# Patient Record
Sex: FEMALE | Race: WHITE | HISPANIC OR LATINO | Employment: UNEMPLOYED | URBAN - METROPOLITAN AREA
[De-identification: names, ages, dates, MRNs, and addresses within clinical notes are randomized per-mention and may not be internally consistent; named-entity substitution may affect disease eponyms.]

---

## 2020-03-24 ENCOUNTER — TELEPHONE (OUTPATIENT)
Dept: SURGERY | Facility: CLINIC | Age: 45
End: 2020-03-24

## 2020-03-24 DIAGNOSIS — C50.212 CARCINOMA OF UPPER-INNER QUADRANT OF LEFT BREAST IN FEMALE, ESTROGEN RECEPTOR POSITIVE (HCC): Primary | ICD-10-CM

## 2020-03-24 DIAGNOSIS — Z17.0 CARCINOMA OF UPPER-INNER QUADRANT OF LEFT BREAST IN FEMALE, ESTROGEN RECEPTOR POSITIVE (HCC): Primary | ICD-10-CM

## 2020-03-24 NOTE — TELEPHONE ENCOUNTER
"Scheduled New Pt with Dr PEREZ  3-30-20 arrive 10:30    Pre screened for possible breast mri   Good on all questions.    HT:5'5\"        Mailed pw    Spoke to pt she v/u  Christi  "

## 2020-03-25 ENCOUNTER — TELEPHONE (OUTPATIENT)
Dept: SURGERY | Facility: CLINIC | Age: 45
End: 2020-03-25

## 2020-03-25 NOTE — TELEPHONE ENCOUNTER
Scheduled Bilateral Breast Mri w wo contrast.      3-26-20 arrive 7:00    Pt. Shamir       Pt said she is getting passport and they will back date-what ever she has done.    Christi

## 2020-03-26 ENCOUNTER — APPOINTMENT (OUTPATIENT)
Dept: MRI IMAGING | Facility: HOSPITAL | Age: 45
End: 2020-03-26

## 2020-03-26 ENCOUNTER — TELEPHONE (OUTPATIENT)
Dept: SURGERY | Facility: CLINIC | Age: 45
End: 2020-03-26

## 2020-03-27 ENCOUNTER — HOSPITAL ENCOUNTER (OUTPATIENT)
Dept: MRI IMAGING | Facility: HOSPITAL | Age: 45
Discharge: HOME | End: 2020-03-27
Admitting: SURGERY

## 2020-03-27 ENCOUNTER — LAB REQUISITION (OUTPATIENT)
Dept: LAB | Facility: HOSPITAL | Age: 45
End: 2020-03-27

## 2020-03-27 DIAGNOSIS — C50.212 CARCINOMA OF UPPER-INNER QUADRANT OF LEFT BREAST IN FEMALE, ESTROGEN RECEPTOR POSITIVE (HCC): ICD-10-CM

## 2020-03-27 DIAGNOSIS — Z00.00 ENCOUNTER FOR GENERAL ADULT MEDICAL EXAMINATION WITHOUT ABNORMAL FINDINGS: ICD-10-CM

## 2020-03-27 DIAGNOSIS — Z17.0 CARCINOMA OF UPPER-INNER QUADRANT OF LEFT BREAST IN FEMALE, ESTROGEN RECEPTOR POSITIVE (HCC): ICD-10-CM

## 2020-03-27 PROCEDURE — A9577 INJ MULTIHANCE: HCPCS | Performed by: SURGERY

## 2020-03-27 PROCEDURE — 77049 MRI BREAST C-+ W/CAD BI: CPT

## 2020-03-27 PROCEDURE — 0 GADOBENATE DIMEGLUMINE 529 MG/ML SOLUTION: Performed by: SURGERY

## 2020-03-27 RX ADMIN — GADOBENATE DIMEGLUMINE 15 ML: 529 INJECTION, SOLUTION INTRAVENOUS at 13:20

## 2020-03-30 ENCOUNTER — HOSPITAL ENCOUNTER (OUTPATIENT)
Dept: ULTRASOUND IMAGING | Facility: HOSPITAL | Age: 45
Discharge: HOME | End: 2020-03-30

## 2020-03-30 ENCOUNTER — TRANSCRIBE ORDERS (OUTPATIENT)
Dept: SURGERY | Facility: CLINIC | Age: 45
End: 2020-03-30

## 2020-03-30 ENCOUNTER — HOSPITAL ENCOUNTER (OUTPATIENT)
Dept: MAMMOGRAPHY | Facility: HOSPITAL | Age: 45
Discharge: HOME | End: 2020-03-30
Admitting: SURGERY

## 2020-03-30 ENCOUNTER — PREP FOR SURGERY (OUTPATIENT)
Dept: OTHER | Facility: HOSPITAL | Age: 45
End: 2020-03-30

## 2020-03-30 ENCOUNTER — TELEPHONE (OUTPATIENT)
Dept: SURGERY | Facility: CLINIC | Age: 45
End: 2020-03-30

## 2020-03-30 ENCOUNTER — OFFICE VISIT (OUTPATIENT)
Dept: SURGERY | Facility: CLINIC | Age: 45
End: 2020-03-30

## 2020-03-30 VITALS
DIASTOLIC BLOOD PRESSURE: 74 MMHG | WEIGHT: 162.3 LBS | HEART RATE: 92 BPM | HEIGHT: 64 IN | TEMPERATURE: 97.6 F | BODY MASS INDEX: 27.71 KG/M2 | SYSTOLIC BLOOD PRESSURE: 128 MMHG | OXYGEN SATURATION: 98 %

## 2020-03-30 DIAGNOSIS — C50.212 CARCINOMA OF UPPER-INNER QUADRANT OF LEFT BREAST IN FEMALE, ESTROGEN RECEPTOR POSITIVE (HCC): Primary | ICD-10-CM

## 2020-03-30 DIAGNOSIS — R92.1 BREAST CALCIFICATIONS ON MAMMOGRAM: ICD-10-CM

## 2020-03-30 DIAGNOSIS — Z17.0 CARCINOMA OF UPPER-INNER QUADRANT OF LEFT BREAST IN FEMALE, ESTROGEN RECEPTOR POSITIVE (HCC): Primary | ICD-10-CM

## 2020-03-30 DIAGNOSIS — R92.8 ABNORMAL MRI, BREAST: ICD-10-CM

## 2020-03-30 DIAGNOSIS — F12.90 MARIJUANA USE: ICD-10-CM

## 2020-03-30 DIAGNOSIS — R92.8 ABNORMAL MAMMOGRAM: ICD-10-CM

## 2020-03-30 DIAGNOSIS — R92.8 ABNORMAL MRI, BREAST: Primary | ICD-10-CM

## 2020-03-30 DIAGNOSIS — R92.8 ABNORMAL ULTRASOUND OF BREAST: ICD-10-CM

## 2020-03-30 DIAGNOSIS — N63.0 LUMP OR MASS IN BREAST: ICD-10-CM

## 2020-03-30 DIAGNOSIS — Z72.0 TOBACCO USE: ICD-10-CM

## 2020-03-30 DIAGNOSIS — C50.212 MALIGNANT NEOPLASM OF UPPER-INNER QUADRANT OF LEFT BREAST IN FEMALE, ESTROGEN RECEPTOR POSITIVE (HCC): ICD-10-CM

## 2020-03-30 DIAGNOSIS — Z17.0 MALIGNANT NEOPLASM OF UPPER-INNER QUADRANT OF LEFT BREAST IN FEMALE, ESTROGEN RECEPTOR POSITIVE (HCC): ICD-10-CM

## 2020-03-30 DIAGNOSIS — Z85.71 HISTORY OF HODGKIN'S LYMPHOMA: Primary | ICD-10-CM

## 2020-03-30 PROCEDURE — 77065 DX MAMMO INCL CAD UNI: CPT

## 2020-03-30 PROCEDURE — 99244 OFF/OP CNSLTJ NEW/EST MOD 40: CPT | Performed by: SURGERY

## 2020-03-30 PROCEDURE — G0279 TOMOSYNTHESIS, MAMMO: HCPCS

## 2020-03-30 RX ORDER — DIAZEPAM 2 MG/1
TABLET ORAL
COMMUNITY
Start: 2020-03-20 | End: 2020-04-28

## 2020-03-30 RX ORDER — ALBUTEROL SULFATE 90 UG/1
AEROSOL, METERED RESPIRATORY (INHALATION)
COMMUNITY
Start: 2020-02-13 | End: 2020-04-28

## 2020-03-30 RX ORDER — MELOXICAM 7.5 MG/1
TABLET ORAL
COMMUNITY
Start: 2020-01-13 | End: 2020-04-28

## 2020-03-30 RX ORDER — CEFAZOLIN SODIUM 2 G/100ML
2 INJECTION, SOLUTION INTRAVENOUS ONCE
Status: CANCELLED | OUTPATIENT
Start: 2020-06-03 | End: 2020-03-30

## 2020-03-30 RX ORDER — SODIUM CHLORIDE, SODIUM LACTATE, POTASSIUM CHLORIDE, CALCIUM CHLORIDE 600; 310; 30; 20 MG/100ML; MG/100ML; MG/100ML; MG/100ML
100 INJECTION, SOLUTION INTRAVENOUS CONTINUOUS
Status: CANCELLED | OUTPATIENT
Start: 2020-06-03

## 2020-03-30 RX ORDER — DIAZEPAM 5 MG/1
10 TABLET ORAL ONCE
Status: CANCELLED | OUTPATIENT
Start: 2020-06-03 | End: 2020-03-30

## 2020-03-30 RX ORDER — GABAPENTIN 100 MG/1
CAPSULE ORAL
COMMUNITY
Start: 2020-02-02 | End: 2020-04-28

## 2020-03-30 NOTE — TELEPHONE ENCOUNTER
Scheduled Right Diagnostic Mammogram and Rt Breast U/S  Scheduled with Ayala arrive today at 9:45      Scheduled Rt Stereo Breast Bx with Dr Lee and Dr Tim to do  3-31-20 arrive 11:30      Christi

## 2020-03-30 NOTE — PROGRESS NOTES
Chief Complaint: Jina Huddleston is a 45 y.o. female who was seen in consultation at the request of Luis Angel Callahan MD  for newly diagnosed breast cancer    History of Present Illness:  Patient presents with newly diagnosed breast cancer. She noted no new masses, skin changes, nipple discharge, nipple changes prior to her most recent imaging.  Her most recent imaging includes the followin2020   [facility]   Mammo  Jina Huddleston  BILATERAL SCREENING  CLINICAL INDICATION: This is the patient’s baseline screening mammogram.  COMPARISON: None.  Heterogeneously dense.  Superior/medial posterior third left breast, is a 10 mm mass which may have a central punctate focus of calcifications.  BIRADS: 0    2020   [facility]   Radiology  Jina Huddleston  ULTRASOUND LEFT BREAST  FINDINGS: 10:00 left breast microlobulated, solid mass measuring 12 mm. 12:00 left breast, 8 x 6 x 4 mm lesion a thin hyperechoic rim, probably benign, six-month follow up recommended.  IMPRESSION: 12 mm suspicious mass, 10:00 left breast explaining mammographic nodularity.  BIRADS: 4           She had a biopsy on the following day that showed:   2020   [facility]   Radiology  Jina Huddleston  ULTRASOUND GUIDED BREAST BX  Left breast.  10:00 position. 3 passes. Marker clip was placed.    2020   [facility]   Mammo  Jina Huddleston  DIAG MAMMO  CLINICAL INDICATION: Post-biopsy mammogram.  The breast parenchyma is heterogeneously. Biopsy clip is located within the mass in the 10:00 position of the deep left breast.  BIRADS: 0      2020   [facility]  Pathology  Jina Huddleston  1. Breast, Left, 10:00, Biopsy: Invasive ductal adenocarcinoma and ductal carcinoma in situ with  A. Invasive ductal adenocarcinoma.  1. [place] Grade II (tubular grade 3, nuclear grade 2, mitotic grade 1).  2. 8 mm  3. No lymphovascular or perineural invasion  4. ER - 100%  5. UT - 100%  6. HER2 - Negative (1+)  B. Ductal carcinoma in  situ.  1. Intermediate nuclear grade with solid and cribriform architecture and no necrosis.  Comment: Ki-67 will be performed.    03/17/2020   [facility]   Pathology  Jina Huddleston  Left Breast Mass at 10:00:   - Invasive ductal carcinoma.  - Grade 1 ([place] Score 2+2+1=5/9_  - No DCIS identified.  - Largest 2 cm.  ER - 100%, 3+  MN - 100%, 3+  HER2/selene Oncoprotein - 0+    I then arranged for a MRI and Dx mammo to follow:  03/27/2020   [facility]  Radiology  Jina Huddleston  MRI BREAST BILATERAL  Posterior one third upper-inner the left breast 11:00 11.5 cm from the nipple there is a metallic clip. No residual or suspicious enhancement is seen at this location.  No areas of abnormal enhancement or morphology are seen within the left breast. No evidence for left axillary or internal mammary chain adenopathy.  Posterior one third upper outer right breast 10:00 10 cm from the nipple there are 2 focal areas of enhancement nonspecific. One of the areas measures 0.8 cm 1.8 cm superior to t a focus of enhancement that measures 0.6 cm in greatest dimension. On the mammogram there is visualization of multiple microcalcifications in the posterior one third upper quadrant of the right breast in conjunction with a small areas of focal asymmetry in the axillary tail right breast.  Additionally, there is a 1.4 cm in length linear enhancement middle third right breast 4:00 in the region where there are linearly arranged microcalcifications middle third lower quadrant the right breast.  No other areas of abnormal enhancement or morphology are seen in the right breast. No evidence for right axillary or internal mammary chain adenopathy.  IMPRESSION:  1. Left breast appears to be a candidate for breast conservation therapy.  2. Right breast 4:00. Stereotactic guided biopsy of the calcifications is recommended.  3. There are 2 nonspecific focal areas of enhancement posterior one third upper outer quadrant of the right breast.  Further evaluation with spot compression and spot magnification upper outer the right breast is recommended.  BIRADS: 4    03/30/2020 [facility] MAMMO DIGITAL DIAG RASHI RIGHT  SHELL LEW   Right digital diagnostic mammography with tomosynthesis scattered fibroglandular densities. Middle third lower inner quadrant of the right breast microcalcifications that correspond to linear enhancement seen in the right breast at the 4:00 position. Additionally, there are multiple punctate monomorphic regional microcalcifications in the posterior one third upper outer quadrant right breast. A nonspecific focal area of enhancement is noted to be in that region on a breast MRI examination. There is also a persistent areas of focal asymmetry with ill defined margins with associated microcalcifications in the axillary tail of the right breast. The density  Measures  03.5 cm. This corresponds to a 0.8 cm nodular area of enhancement on the patient's breast MRI examination. IMPRESSION: 0.5 cm ill defined density with associate calcifications in the axillary tail. Corresponds to 0.8 cm nodular area of enhnacement in the posterior one third axillary tail right breast MRI. Correlation with a stereotactic  Guided right breast biopsy is recommended. There are microcalcifications seen in the middle third lower inner quadrant of the right breast at the 4:00 position. Correlation with stereotactic guided right breast biopsy is recommended. There are multiple regional microcalcifications seen in the posterior one third of the upper outer quadrant the right breast. Six month mammographic follow up is recommended.      She has not had a breast biopsy in the past.  She has her  ovaries, is postmenopausal, and takes nor hormones.Uterus removed 2004 for bleeding  Her family history includes the following: She has 1 daughter, 2 sisters, 3 maternal aunts, no paternal aunts.  No family history of breast or ovarian cancer.  She is here for  evaluation.    Review of Systems:  Review of Systems   Constitutional: Positive for appetite change, diaphoresis and unexpected weight change (20 LB OVER 3 MONTHS ). Negative for chills, fatigue and fever.   HENT:   Positive for trouble swallowing. Negative for hearing loss, lump/mass, mouth sores, nosebleeds, sore throat, tinnitus and voice change.    Eyes: Positive for eye problems (DOUBLE VISION). Negative for icterus.   Respiratory: Positive for cough and wheezing. Negative for chest tightness, hemoptysis and shortness of breath.    Cardiovascular: Negative for chest pain, leg swelling and palpitations.   Gastrointestinal: Positive for nausea. Negative for abdominal distention, abdominal pain, blood in stool, constipation, diarrhea, rectal pain and vomiting.   Endocrine: Negative for hot flashes.   Genitourinary: Negative for bladder incontinence, difficulty urinating, dyspareunia, dysuria, frequency, hematuria, menstrual problem, nocturia, pelvic pain, vaginal bleeding and vaginal discharge.    Musculoskeletal: Positive for back pain. Negative for arthralgias, flank pain, gait problem, myalgias, neck pain and neck stiffness.   Skin: Negative for itching, rash and wound.   Neurological: Negative for dizziness, extremity weakness, gait problem, headaches, light-headedness, numbness, seizures and speech difficulty.   Hematological: Negative for adenopathy. Bruises/bleeds easily.   Psychiatric/Behavioral: Negative for confusion, decreased concentration, depression, sleep disturbance and suicidal ideas. The patient is not nervous/anxious.         Past Medical and Surgical History:  Breast Biopsy History:  Patient had not had a breast biopsy prior to her cancer diagnosis.  Breast Cancer HIstory:  Patient does not have a past medical history of breast cancer.  Breast Operations, and year:  NONE   Obstetric/Gynecologic History:  Age menstrual periods began: 9  Patient is postmenopausal due to removal of her uterus in  "the following year:   Number of pregnancies:3  Number of live births: 2  Number of abortions or miscarriages: 1  Age of delivery of first child: 19  Patient did not breast feed.  Length of time taking birth control pills: 7 YRS   Patient has never taken hormone replacement  PATIENT HAS BOTH OVARIES. UTERUS REMOVED .     Past Surgical History:   Procedure Laterality Date   • BREAST BIOPSY     • BREAST BIOPSY     • CYST REMOVAL      HAND   • DIAGNOSTIC LAPAROSCOPY     • HYSTERECTOMY     • KNEE SURGERY Left    • TUBAL ABDOMINAL LIGATION     • VENOUS ACCESS DEVICE (PORT) INSERTION AND REMOVAL       Past Medical History:   Diagnosis Date   • Asthma, exercise induced    • Back pain    • Bipolar I disorder (CMS/HCC)    • Hodgkin lymphoma (CMS/HCC)    • Kidney infection    • Marijuana use    • Mono exposure    • Neuropathy     FINGERS       Prior Hospitalizations, other than for surgery or childbirth, and year:  MONO AND UTI    Social History     Socioeconomic History   • Marital status:      Spouse name: Not on file   • Number of children: Not on file   • Years of education: Not on file   • Highest education level: Not on file   Tobacco Use   • Smoking status: Former Smoker     Packs/day: 0.50     Types: Cigarettes     Start date:      Last attempt to quit:      Years since quittin.2   • Smokeless tobacco: Never Used   Substance and Sexual Activity   • Alcohol use: Not Currently   • Drug use: Yes     Types: Marijuana     Comment: OCC     Patient is .  IN HOME HEALTH CARE   Patient drinks 2 servings of caffeine per day.    Family History:  Family History   Problem Relation Age of Onset   • Cancer Maternal Uncle         UNKNOWN ORGIN    • Prostate cancer Maternal Uncle         UNKNOWN AGE        Vital Signs:  /74   Pulse 92   Temp 97.6 °F (36.4 °C) (Temporal)   Ht 162.6 cm (64\")   Wt 73.6 kg (162 lb 4.8 oz)   LMP  (LMP Unknown)   SpO2 98%   Breastfeeding No   BMI 27.86 kg/m²  " "    Medications:    Current Outpatient Medications:   •  albuterol sulfate  (90 Base) MCG/ACT inhaler, USE 1 TO 2 PUFFS Q 4 TO 6 H PRN, Disp: , Rfl:   •  diazePAM (VALIUM) 2 MG tablet, TK 1 T PO TID PRN, Disp: , Rfl:   •  gabapentin (NEURONTIN) 100 MG capsule, TK 1 C PO TID AS NEEDED, Disp: , Rfl:   •  meloxicam (MOBIC) 7.5 MG tablet, TK 1 T PO D PRN, Disp: , Rfl:      Allergies:  No Known Allergies    Physical Examination:  /74   Pulse 92   Temp 97.6 °F (36.4 °C) (Temporal)   Ht 162.6 cm (64\")   Wt 73.6 kg (162 lb 4.8 oz)   LMP  (LMP Unknown)   SpO2 98%   Breastfeeding No   BMI 27.86 kg/m²   General Appearance:  Patient is in no distress.  She is well kept and has an average build.   Psychiatric:  Patient with appropriate mood and affect. Alert and oriented to self, time, and place.    Breast, RIGHT:  large sized, symmetric with the contralateral side.  Breast skin is without erythema, edema, rashes.  There are no visible abnormalities upon inspection during the arm-raising maneuver or with hands on hips in the sitting position. There is no nipple retraction, discharge or nipple/areolar skin changes.There are no masses palpable in the sitting or supine positions.    Breast, LEFT:  large sized, symmetric with the contralateral side.  Breast skin is without erythema, edema, rashes.  There are no visible abnormalities upon inspection during the arm-raising maneuver or with hands on hips in the sitting position. There is no nipple retraction, discharge or nipple/areolar skin changes. There is a 1 cm mass palpable at the left breast 1030, 13.5 cm from the nipple site of known breast cancer.  No overlying skin changes.  There are no other masses palpable in the sitting or supine positions.    Lymphatic:  There is no axillary, cervical, infraclavicular, or supraclavicular adenopathy bilaterally.  Eyes:  Pupils are round and reactive to light.  Cardiovascular:  Heart rate and rhythm are " regular.  Respiratory:  Lungs are clear bilaterally with no crackles or wheezes in any lung field.  Gastrointestinal:  Abdomen is soft, nondistended, and nontender.     Musculoskeletal:  Good strength in all 4 extremities.   There is good range of motion in both shoulders.    Skin:  No new skin lesions or rashes on the skin excluding the breast (see breast exam above).        Imagin2020   [facility]   Mammo  Jina Huddleston  BILATERAL SCREENING  CLINICAL INDICATION: This is the patient’s baseline screening mammogram.  COMPARISON: None.  Heterogeneously dense.  Superior/medial posterior third left breast, is a 10 mm mass which may have a central punctate focus of calcifications.  BIRADS: 0    2020   [facility]   Radiology  Jina Huddleston  ULTRASOUND LEFT BREAST  FINDINGS: 10:00 left breast microlobulated, solid mass measuring 12 mm. 12:00 left breast, 8 x 6 x 4 mm lesion a thin hyperechoic rim, probably benign, six-month follow up recommended.  IMPRESSION: 12 mm suspicious mass, 10:00 left breast explaining mammographic nodularity.  BIRADS: 4    2020   [facility]  Radiology  Jina Huddleston  MRI BREAST BILATERAL  Posterior one third upper-inner the left breast 11:00 11.5 cm from the nipple there is a metallic clip. No residual or suspicious enhancement is seen at this location.  No areas of abnormal enhancement or morphology are seen within the left breast. No evidence for left axillary or internal mammary chain adenopathy.  Posterior one third upper outer right breast 10:00 10 cm from the nipple there are 2 focal areas of enhancement nonspecific. One of the areas measures 0.8 cm 1.8 cm superior to t a focus of enhancement that measures 0.6 cm in greatest dimension. On the mammogram there is visualization of multiple microcalcifications in the posterior one third upper quadrant of the right breast in conjunction with a small areas of focal asymmetry in the axillary tail right breast.  Additionally,  there is a 1.4 cm in length linear enhancement middle third right breast 4:00 in the region where there are linearly arranged microcalcifications middle third lower quadrant the right breast.  No other areas of abnormal enhancement or morphology are seen in the right breast. No evidence for right axillary or internal mammary chain adenopathy.  IMPRESSION:  4. Left breast appears to be a candidate for breast conservation therapy.  5. Right breast 4:00. Stereotactic guided biopsy of the calcifications is recommended.  6. There are 2 nonspecific focal areas of enhancement posterior one third upper outer quadrant of the right breast. Further evaluation with spot compression and spot magnification upper outer the right breast is recommended.  BIRADS: 4    03/30/2020 [facility] MAMMO DIGITAL DIAG RASHI RIGHT  SHELL LEW   Right digital diagnostic mammography with tomosynthesis scattered fibroglandular densities. Middle third lower inner quadrant of the right breast microcalcifications that correspond to linear enhancement seen in the right breast at the 4:00 position. Additionally, there are multiple punctate monomorphic regional microcalcifications in the posterior one third upper outer quadrant right breast. A nonspecific focal area of enhancement is noted to be in that region on a breast MRI examination. There is also a persistent areas of focal asymmetry with ill defined margins with associated microcalcifications in the axillary tail of the right breast. The density  Measures  03.5 cm. This corresponds to a 0.8 cm nodular area of enhancement on the patient's breast MRI examination. IMPRESSION: 0.5 cm ill defined density with associate calcifications in the axillary tail. Corresponds to 0.8 cm nodular area of enhnacement in the posterior one third axillary tail right breast MRI. Correlation with a stereotactic  Guided right breast biopsy is recommended. There are microcalcifications seen in the middle third lower inner  quadrant of the right breast at the 4:00 position. Correlation with stereotactic guided right breast biopsy is recommended. There are multiple regional microcalcifications seen in the posterior one third of the upper outer quadrant the right breast. Six month mammographic follow up is recommended.     Pathology:  03/17/2020   [facility]   Radiology  Jina Huddleston  ULTRASOUND GUIDED BREAST BX  Left breast.  10:00 position. 3 passes. Marker clip was placed.    03/17/2020   [facility]   Mammo  Jina Huddleston  DIAG MAMMO  CLINICAL INDICATION: Post-biopsy mammogram.  The breast parenchyma is heterogeneously. Biopsy clip is located within the mass in the 10:00 position of the deep left breast.  BIRADS: 0      03/27/2020   [facility]  Pathology  Jina Huddleston  2. Breast, Left, 10:00, Biopsy: Invasive ductal adenocarcinoma and ductal carcinoma in situ with  C. Invasive ductal adenocarcinoma.  7. [place] Grade II (tubular grade 3, nuclear grade 2, mitotic grade 1).  8. 8 mm  9. No lymphovascular or perineural invasion  10. ER - 100%  11. CO - 100%  12. HER2 - Negative (1+)  D. Ductal carcinoma in situ.  2. Intermediate nuclear grade with solid and cribriform architecture and no necrosis.  Comment: Ki-67 will be performed.    03/17/2020   [facility]   Pathology  Jina Huddleston  Left Breast Mass at 10:00:   - Invasive ductal carcinoma.  - Grade 1 ([place] Score 2+2+1=5/9_  - No DCIS identified.  - Largest 2 cm.  ER - 100%, 3+  CO - 100%, 3+  HER2/selene Oncoprotein - 0+      Procedures:      Assessment:   Diagnosis Plan   1. History of Hodgkin's lymphoma     2. Malignant neoplasm of upper-inner quadrant of left breast in female, estrogen receptor positive (CMS/HCC)  Ambulatory Referral to Radiation Oncology    Ambulatory Referral to Plastic Surgery   3. Abnormal mammogram     4. Lump or mass in breast     5. Abnormal ultrasound of breast     6. Breast calcifications on mammogram     7. Abnormal MRI, breast     8. Tobacco use      9. Marijuana use     1-  Diagnosis at age 17.  Took chemo radiation including [facility]VD at [facility].    2-5  Left breast 10:30, 13.5 cm from the nipple.  1.2 cm mass on ultrasound, 1.0 cm mass with calcifications on mammogram.  Invasive mammary carcinoma, no special type, low-grade, 2, 2, 1, no duct carcinoma site 2, 2 cm largest, marker in good position.  Estrogen 100, progesterone 100, HER-2/selene 0.  Internal review invasive mammary carcinoma, no special type, 3, 2, 1, intermediate grade, no lymphovascular ration, 8 mm largest, associated duct carcinoma in situ intermediate grade, solid and cribriform.  Estrogen 100, progesterone 100, cast 67 is pending, HER-2/selene 1+.  Clinical stage T1CN0 stage Ia.  Patient will need a mastectomy due to previous chest wall radiation.  Patient will take hormone blocking medication until the [place] pandemic has passed and will tentatively hope for surgery around June 2020.      3,6  Right breast posterior one third upper outer quadrant 10:00, 10 cm from the nipple 2 areas of enhancement on MRI measuring 1.8 cm and 6 mm.  BI-RADS 0.  Diagnostic imaging March 30, 2020 showed 5 mm ill-defined density with associated calcifications in the axillary tail region corresponding to an 8 mm nodular area of enhancement on MRI.  BI-RADS 4 recommend stereotactic biopsy.    Right breast 4:00, 1.4 cm area of enhancement on MRI at the site of mammographic calcifications, BI-RADS 4 recommend stereotactic biopsy.    6-  Regional right breast calcifications posterior one third upper outer right breast BI-RADS 3 recommend mammogram in 6 months.  These would be due September 2020 right mammogram.    8-  1/2-1ppd for 30 years, stopped 2020 after breast ca dx    9-  Occasional marijuana    Plan:  Jina and I reviewed her history, imaging, imaging reports, pathology reports, past treatment together today.  We discussed that due to the radiation that she took and she indicated a region from her  earlobe down to her nipple bilaterally, that this prohibits us from proceeding with breast conservation therapy.  We will arrange for her to see Dr. Rodríguez to verify this.  We discussed then that the default procedure in the absence of the ability to do breast conservation which necessitates lumpectomy with radiation, would be a--     left total mastectomy, left axillary sentinel lymph node biopsy, possible axillary lymph node dissection, possible reconstruction.      We discussed the risks of bleeding, infection, skin loss, injury to nerves or vessels in the axilla, lymphedema,    We discussed that prior to doing surgery, we would arrange for a right breast stereotactic biopsy x2.  The first site being the 4:00 region and the second site being the upper outer quadrant region.  Presuming the right breast biopsies were benign, we would proceed with the above procedure.  Due to the Covid 19 pandemic we will plan for her case in June.  In the interim, we have sent off genetics due to her young age at diagnosis of breast cancer as well as having lymphoma.  Sent off and [facility] breast and gyn add on lymphoma panel.    We will arrange for her to see plastic surgery to discuss possible reconstructive options.  We will have her see Dr. Rodríguez to discuss and verify that she would not be a candidate for radiation.  We will have her see Dr. Callahan to start an aromatase inhibitor to transition her to June.    I asked her to stay off of the cigarettes in preparation for surgery as well.      I did communicate with Dr. Callahan today about the aromatase inhibitor.      Shreya Tim MD      We have spent 60 minutes in visit today, 45 in face to face .      Next Appointment:  Return for surgery.      EMR Dragon/transcription disclaimer:    Much of this encounter note is an electronic transcription/translocation of spoken language to printed text.  The electronic translation of spoken language may permit erroneous, or at  times, nonsensical words or phrases to be inadvertently transcribed.  Although I have reviewed the note from such areas, some may still exist.

## 2020-03-30 NOTE — TELEPHONE ENCOUNTER
Scheduled Consult with Dr Rodríguez 4-2-2020 @ 9:30    Scheduled F/u with  4-2-2020 @ 8:30    Spoke to Renetta will call her back tomorrow for appt with Dr Guevara.    Pt is aware of Oncology appointments.    Christi

## 2020-03-31 ENCOUNTER — HOSPITAL ENCOUNTER (OUTPATIENT)
Dept: MAMMOGRAPHY | Facility: HOSPITAL | Age: 45
Discharge: HOME | End: 2020-03-31

## 2020-03-31 ENCOUNTER — TELEPHONE (OUTPATIENT)
Dept: SURGERY | Facility: CLINIC | Age: 45
End: 2020-03-31

## 2020-03-31 VITALS
BODY MASS INDEX: 27.69 KG/M2 | DIASTOLIC BLOOD PRESSURE: 87 MMHG | HEART RATE: 91 BPM | OXYGEN SATURATION: 98 % | TEMPERATURE: 97.5 F | SYSTOLIC BLOOD PRESSURE: 135 MMHG | HEIGHT: 64 IN | RESPIRATION RATE: 18 BRPM | WEIGHT: 162.19 LBS

## 2020-03-31 PROBLEM — C50.212 CARCINOMA OF UPPER-INNER QUADRANT OF LEFT BREAST IN FEMALE, ESTROGEN RECEPTOR POSITIVE: Status: ACTIVE | Noted: 2020-03-31

## 2020-03-31 PROBLEM — Z17.0 CARCINOMA OF UPPER-INNER QUADRANT OF LEFT BREAST IN FEMALE, ESTROGEN RECEPTOR POSITIVE (HCC): Status: ACTIVE | Noted: 2020-03-31

## 2020-03-31 NOTE — TELEPHONE ENCOUNTER
Scheduled Consult with Dr Guevara  5-7-2020 arrive 1:15    Scheduled Surgery Main OR 6-3-2020  Lt Breast total mastectomy, Lt Axilla SN Bx, Possible Node Dissection, Possible Reconstruction-Dr Guevara        Arrive         5:15  [facility]               7:00  Surgery     8:00    PAT 5-26-20 @ 10:00  Return to see Dr. PEREZ 6-16-20 arrive 10:15    K    Pt received information.

## 2020-03-31 NOTE — TELEPHONE ENCOUNTER
Stereo Bx machine went down, they could not do bx.  Ayala will call me back to reschedule.    Christi

## 2020-04-01 ENCOUNTER — TELEPHONE (OUTPATIENT)
Dept: OTHER | Facility: HOSPITAL | Age: 45
End: 2020-04-01

## 2020-04-01 NOTE — TELEPHONE ENCOUNTER
Referral received from Dr. Tim's office. Called Ms. Huddleston and left a message introducing myself and navigational services. Asked her to call me back at her convenience and left my contact information.

## 2020-04-02 ENCOUNTER — TELEPHONE (OUTPATIENT)
Dept: SURGERY | Facility: CLINIC | Age: 45
End: 2020-04-02

## 2020-04-03 LAB
CYTO UR: NORMAL
LAB AP CASE REPORT: NORMAL
LAB AP DIAGNOSIS COMMENT: NORMAL
PATH REPORT.ADDENDUM SPEC: NORMAL
PATH REPORT.FINAL DX SPEC: NORMAL
PATH REPORT.GROSS SPEC: NORMAL

## 2020-04-07 ENCOUNTER — HOSPITAL ENCOUNTER (OUTPATIENT)
Dept: MAMMOGRAPHY | Facility: HOSPITAL | Age: 45
End: 2020-04-07

## 2020-04-07 ENCOUNTER — TELEPHONE (OUTPATIENT)
Dept: SURGERY | Facility: CLINIC | Age: 45
End: 2020-04-07

## 2020-04-07 ENCOUNTER — APPOINTMENT (OUTPATIENT)
Dept: MAMMOGRAPHY | Facility: HOSPITAL | Age: 45
End: 2020-04-07

## 2020-04-07 ENCOUNTER — HOSPITAL ENCOUNTER (OUTPATIENT)
Dept: MAMMOGRAPHY | Facility: HOSPITAL | Age: 45
Discharge: HOME | End: 2020-04-07

## 2020-04-07 ENCOUNTER — OFFICE VISIT (OUTPATIENT)
Dept: SURGERY | Facility: CLINIC | Age: 45
End: 2020-04-07

## 2020-04-07 ENCOUNTER — HOSPITAL ENCOUNTER (OUTPATIENT)
Dept: MAMMOGRAPHY | Facility: HOSPITAL | Age: 45
Discharge: HOME | End: 2020-04-07
Admitting: SURGERY

## 2020-04-07 VITALS
SYSTOLIC BLOOD PRESSURE: 159 MMHG | OXYGEN SATURATION: 98 % | RESPIRATION RATE: 18 BRPM | DIASTOLIC BLOOD PRESSURE: 90 MMHG | HEIGHT: 64 IN | BODY MASS INDEX: 27.83 KG/M2 | TEMPERATURE: 96.7 F | HEART RATE: 88 BPM | WEIGHT: 163 LBS

## 2020-04-07 DIAGNOSIS — R92.8 ABNORMAL MRI, BREAST: ICD-10-CM

## 2020-04-07 DIAGNOSIS — R92.1 BREAST CALCIFICATIONS ON MAMMOGRAM: Primary | ICD-10-CM

## 2020-04-07 DIAGNOSIS — R92.8 ABNORMAL MAMMOGRAM: ICD-10-CM

## 2020-04-07 PROCEDURE — 19082 BX BREAST ADD LESION STRTCTC: CPT | Performed by: SURGERY

## 2020-04-07 PROCEDURE — 19081 BX BREAST 1ST LESION STRTCTC: CPT | Performed by: SURGERY

## 2020-04-07 PROCEDURE — 25010000003 LIDOCAINE 1 % SOLUTION: Performed by: SURGERY

## 2020-04-07 PROCEDURE — 88305 TISSUE EXAM BY PATHOLOGIST: CPT | Performed by: SURGERY

## 2020-04-07 RX ORDER — LIDOCAINE HYDROCHLORIDE AND EPINEPHRINE 10; 10 MG/ML; UG/ML
20 INJECTION, SOLUTION INFILTRATION; PERINEURAL ONCE
Status: COMPLETED | OUTPATIENT
Start: 2020-04-07 | End: 2020-04-07

## 2020-04-07 RX ORDER — LIDOCAINE HYDROCHLORIDE 10 MG/ML
3 INJECTION, SOLUTION INFILTRATION; PERINEURAL ONCE
Status: COMPLETED | OUTPATIENT
Start: 2020-04-07 | End: 2020-04-07

## 2020-04-07 RX ADMIN — LIDOCAINE HYDROCHLORIDE 3 ML: 10 INJECTION, SOLUTION INFILTRATION; PERINEURAL at 10:46

## 2020-04-07 RX ADMIN — LIDOCAINE HYDROCHLORIDE AND EPINEPHRINE 20 ML: 10; 10 INJECTION, SOLUTION INFILTRATION; PERINEURAL at 10:48

## 2020-04-07 RX ADMIN — LIDOCAINE HYDROCHLORIDE AND EPINEPHRINE 20 ML: 10; 10 INJECTION, SOLUTION INFILTRATION; PERINEURAL at 09:25

## 2020-04-07 RX ADMIN — LIDOCAINE HYDROCHLORIDE 3 ML: 10 INJECTION, SOLUTION INFILTRATION; PERINEURAL at 09:23

## 2020-04-07 NOTE — PROGRESS NOTES
Stereotactic Breast Biopsy #1    Procedure: Stereotactic biopsy, RIGHT breast #1  Location: UOQ  Approach: LM  Device: 10G mammatome  Marker: mammomark bowtie marker  Preoperative Diagnosis: mammographic nodularity associated with MRI enhancement  Indication: BIRADS 4  Description of procedure: The patient was brought to the stereo suite and was positioned in a chair and put into compression in the upright 3D Hologic Affirm Stereotactic breast biopsy system.  The lateral arm was not used for this case.   The approach was lateral to medial  The lesion located on  view.  We then targeted on the lesion, with the needle anterior to the lesion. We targeted 3-5mm off of the lesion so that it was visible in the trough.   We ensured an adequate stroke margin.   We then read out the coordinates.    We then cleaned the breast skin at the site of the anticipated mammotomy. We then performed a time out to identify correct patient, correct site.   I used the distance calculated by the affirm software to identify the anticipated depth of the needle into the breast.  I then anesthetized the breast skin at the site of anticipated mammotomy using 1% lidocaine with epinephrine.    I then anesthetized the breast tissue deep to this along the anticipated track of the mammotome.   I used a total of 20ml local anesthetic.      I then made a nicking incision and advanced the 10G mammatome to the prefire location according to  the x-y-z coordinates.     I then took a prefire image to ensure that the target was in the capture aperture.    I then fired the device.  We then took postfire mammogrpahic pairs to ensure good proximity of the capture aperture to the targeted lesion.      I then took sequential samples posterosuperior, posterior, and posteroinferior, for a total of 5 cores.      We then took a specimen radiograph in the mammotome confirm device  to ensure adequate sampling of the target lesion. Density was seen in the  cores.     I then pulled the needle back 7mm,  placed a mammomark bowtie marker into the biopsy cavity and withdrew the mammatome device.    We then took post biopsy mammographic image to ensure that the target was sampled and to ensure proper placement of the marker in the biopsy cavity.    There was good sampling and the marker in adequate position.   We then held pressure,cleaned and dried the skin at the site of the mammotomy, and applied dermabond.    The patient tolerated the procedure well.  Disposition: to be determined by the pathology report.    Stereotactic Breast Biopsy #2    Procedure: Stereotactic biopsy, RIGHT breast #2  Location: 4:00  Approach:   Device: 10G mammatome  Marker: mammomark bowtie marker  Preoperative Diagnosis: indeterminate calcifications  Indication: BIRADS 4  Description of procedure: The patient was brought to the stereo suite and was positioned in a chair and put into compression in the upright 3D Hologic Affirm Stereotactic breast biopsy system.  The lateral arm was used for this case.   The approach was medial to lateral.  The lesion located on  view.  We then targeted on the lesion, with the needle anterior to the lesion. We targeted 3-5mm off of the lesion so that it was visible in the trough.   We ensured an adequate stroke margin.   We then read out the coordinates and the needle was set to position on the x-y-z axes.    We then cleaned the breast skin at the site of the anticipated mammotomy. We then performed a time out to identify correct patient, correct site.   I used the distance calculated by the affirm software to identify the anticipated depth of the needle into the breast.  I then anesthetized the breast skin at the site of anticipated mammotomy using 1% lidocaine with epinephrine.    I then anesthetized the breast tissue deep to this along the anticipated track of the mammotome.   I used a total of 25ml local anesthetic.      I then made a nicking incision  and advanced the 10G mammatome to the prefire location according to the lateral arm coordinates.    I then took a prefire image to ensure that the target was in the capture aperture. The lesion was at the distal portion of the capture aperture, so I advanced the needle 7mm in the breast prior to firing.    I then fired the device.  We then took postfire mammogrpahic pairs to ensure good proximity of the capture aperture to the targeted lesion.      I then took sequential samples posterosuperior, posterior, and posteroinferior, for a total of 5 cores.      We then took a specimen radiograph in the mammotome confirm device  to ensure adequate sampling of the target lesion. There was a distinct cluster of calcifications within the samples.     I then pulled the needle back 7mm,  placed a mammomark bowtie marker into the biopsy cavity and withdrew the mammatome device.    We then took post biopsy mammogrpahic image to ensure that the target was sampled and to ensure proper placement of the marker in the biopsy cavity.    There was good sampling and the marker in adequate position.   We then held pressure,cleaned and dried the skin at the site of the mammotomy, and applied dermabond.    The patient tolerated the procedure well.  Disposition:  to be determined by the pathology report.

## 2020-04-07 NOTE — TELEPHONE ENCOUNTER
Had to leave a message at Dr.Paris West office  Radiation Oncology [phone]    Pt needs appt with them.

## 2020-04-07 NOTE — TELEPHONE ENCOUNTER
Note from Dr. Shanthi Rodríguez dated April 2, 2020: Even if the right biopsies returned negative, this patient is at increased risk for developing a breast cancer on the right.  We recommended that she undergo bilateral mastectomy with immediate reconstruction.  Radiation would only be indicated if she had a locally advanced cancer greater than 5 cm or lymph node positivity.  She is planning a bilateral carotid duplex due to her previous radiation of her neck.  Due to her previous smoking history is planning low-dose screening CTs annually or a PET CT.  Spent time trying to convince the patient to quit smoking.  Patient is not wanting to proceed with any cessation at this time.  The patient has been ordered for a PET CT with Dr. Callahan.  We will get a Doppler and see her back.  She feels very comfortable in proceeding with bilateral mastectomy and immediate reconstruction with Dr. Tim and Dr. Waterhouse.    Note from Dr. Luis Angel Callahan dated April 2, 2020: We plan for a PET/CT since there is concern for possible axillary involvement.  Patient was started on anastrozole since we are going to be in a holding pattern.

## 2020-04-07 NOTE — TELEPHONE ENCOUNTER
Message for patient to call me back. Genetic sample failed. I need to set patient up for blood draw ASAP.

## 2020-04-07 NOTE — NURSING NOTE
Patient was marked and consented prior to procedure. Biopsy site to   Right lateral breast clear with Steri-strips intact with a small amount of damp blood on them. Biopsy site to right medial breast with Steri-strips dry and intact (2 skin nicks made but only one medial biopsy site; had to retarget site). No firmness or swelling noted at or around biopsy site. Light bruising just above lateral skin nick. Wide ae pressure wrap applies per Dr. Tim's protocol. Denies pain. Ice packs with protective coverings applied to both biopsy sites. Discharge instructions discussed with understanding voiced by patient. Copies provided to patient. No distress noted. Escorted to Dr. Tim's office ambulatory for lab work.

## 2020-04-08 LAB
CYTO UR: NORMAL
LAB AP CASE REPORT: NORMAL
LAB AP CLINICAL INFORMATION: NORMAL
LAB AP DIAGNOSIS COMMENT: NORMAL
PATH REPORT.FINAL DX SPEC: NORMAL
PATH REPORT.GROSS SPEC: NORMAL

## 2020-04-09 ENCOUNTER — TELEPHONE (OUTPATIENT)
Dept: OTHER | Facility: HOSPITAL | Age: 45
End: 2020-04-09

## 2020-04-09 NOTE — TELEPHONE ENCOUNTER
Referral received from Dr. Tim's office. I called Ms. Huddleston and introduced myself and navigational services. She states the plan is to have a bilateral mastectomy in June and will have a consult with the plastic surgeon in May. She stated she had radiation treatments and chemo when she was 17 years old so she is familiar with this journey. She has a good understanding of her pathology and treatment options and is comfortable with her plan of care.     She stated she has a good support system and her daughter is helping her with any daily needs during this pandemic. We discussed that we have counseling services available through our [facility]. She declined the need for that at present.    We discussed integrative therapies and other services at the [facility]. She verbalized interest in receiving a navigation folder outlining services. I verified her mailing address and will send out a navigation folder with the following information:     [facility],  Sharing Our Stories Breast Cancer Support Group, Cancer and Restorative Exercise (CARE), [facility], Together for Breast Cancer Survival,  For Women Facing Breast Cancer, Road to Recovery, Bioimpedance, [facility], Massage Therapy, Reiki Therapy, [employer] [facility], Cancer Nutrition, and Survivorship Clinic.     She verbalized appreciation for navigational services and she has my contact information and will call with any questions that arise.

## 2020-04-10 ENCOUNTER — OFFICE VISIT (OUTPATIENT)
Dept: SURGERY | Facility: CLINIC | Age: 45
End: 2020-04-10

## 2020-04-10 ENCOUNTER — PREP FOR SURGERY (OUTPATIENT)
Dept: OTHER | Facility: HOSPITAL | Age: 45
End: 2020-04-10

## 2020-04-10 ENCOUNTER — TELEPHONE (OUTPATIENT)
Dept: SURGERY | Facility: CLINIC | Age: 45
End: 2020-04-10

## 2020-04-10 DIAGNOSIS — C50.212 MALIGNANT NEOPLASM OF UPPER-INNER QUADRANT OF LEFT FEMALE BREAST, UNSPECIFIED ESTROGEN RECEPTOR STATUS (HCC): Primary | ICD-10-CM

## 2020-04-10 PROCEDURE — 99214 OFFICE O/P EST MOD 30 MIN: CPT | Performed by: SURGERY

## 2020-04-10 NOTE — PROGRESS NOTES
Pathology from RIGHT breast stereotactic biopsies returned as the followin-  Right axillary tail benign axillary vascular and adipose tissue no calcifications.  No atypical hyperplasia in situ or invasive carcinoma.  Minimal calcification seen.    2-  Right breast 4:00: Benign breast with columnar cell change, usual duct hyperplasia associated microcalcifications.    I called and spoke with Ms. Huddleston today.  We discussed that there was no malignancy on the right biopsy specimen.  We discussed that the radiologist recommended excision of the axillary tail lesion and follow-up right mammogram in 6 months for the regional calcifications upper outer quadrant.  At this juncture Mrs. Huddleston would like to proceed with a bilateral mastectomy.  She is a aware of the increased risk for developing breast cancer due to the radiation that she received for her lymphoma at a young age.  Her medical oncologist has also recommended bilateral mastectomy to her.  We discussed that there is no survival benefit to doing a bilateral and that it is too prevent a right breast cancer.  We discussed that this risk reduction on the right is up to 90%.  We discussed that due to the imaging finding on the right that we would plan for a right sentinel lymph node biopsy.  Therefore we discussed the procedure of a bilateral total mastectomy, bilateral sentinel node biopsy, possible left axillary lymph node dissection, possible reconstruction.  We will not plan to perform a frozen section on the right.  The right side is risk reducing.    She understands that the risks of bleeding, infection, skin loss, injury to nerves or vessels in the axilla, lymphedema are the same on the right as they would be on the left.  I did instruct her to put her EMLA cream on both nipples now not just the left.    She will be seeing plastic surgery in May.  Her genetics [facility] panel [phone] returned as breast and gyn add on prostate- negative for  mutation.  We willl et her know    She is started on the aromatase inhibitor.  She tells me that her PET scan ordered by Dr. Callahan was negative for any finding.  We spent 25 minutes in conversation today.    I will change her case request and consent accordingly.    Presented her case at April 17, 2020 tumor board.  Given a designation of C1 for a T1N0 estrogen positive malignancy.  She is on an aromatase inhibitor currently with Dr. Callahan.

## 2020-04-13 ENCOUNTER — TELEPHONE (OUTPATIENT)
Dept: SURGERY | Facility: CLINIC | Age: 45
End: 2020-04-13

## 2020-04-14 ENCOUNTER — TRANSCRIBE ORDERS (OUTPATIENT)
Dept: SURGERY | Facility: CLINIC | Age: 45
End: 2020-04-14

## 2020-04-14 ENCOUNTER — TELEPHONE (OUTPATIENT)
Dept: SURGERY | Facility: CLINIC | Age: 45
End: 2020-04-14

## 2020-04-14 DIAGNOSIS — Z17.0 CARCINOMA OF UPPER-INNER QUADRANT OF LEFT BREAST IN FEMALE, ESTROGEN RECEPTOR POSITIVE (HCC): Primary | ICD-10-CM

## 2020-04-14 DIAGNOSIS — C50.212 CARCINOMA OF UPPER-INNER QUADRANT OF LEFT BREAST IN FEMALE, ESTROGEN RECEPTOR POSITIVE (HCC): Primary | ICD-10-CM

## 2020-04-14 NOTE — TELEPHONE ENCOUNTER
Surgery procedure changed.  6-3-2020 Main OR  Bilateral Total Mastectomy,bilateral [place] Node Biopsy,Possible  Left Axilla Node Dissection, Possible Reconstruction Dr Ismael Muro to assist.      Arrive   5:15  Lt   SI  7:00  Rt  SI  8:00    Surgery 9:00    PAT 5-26-20 @ 10:00    Post Op 6-16-20 @ 10:30      LM for patient to call me,  She needs to sign consent on 5-26-20      Christi

## 2020-04-16 ENCOUNTER — TELEPHONE (OUTPATIENT)
Dept: SURGERY | Facility: CLINIC | Age: 45
End: 2020-04-16

## 2020-04-16 NOTE — TELEPHONE ENCOUNTER
PET/CT from [facility] dated April 6, 2020 interstitial changes medial upper lobes bilaterally likely related to posttreatment change 3 months CT of the chest is recommended otherwise no evidence of metastatic disease in the neck chest abdomen or pelvis.  April 6, 2020 [facility] bilateral carotid duplex: No evidence of any hemodynamically significant stenosis or occlusion.

## 2020-04-20 ENCOUNTER — TELEPHONE (OUTPATIENT)
Dept: SURGERY | Facility: CLINIC | Age: 45
End: 2020-04-20

## 2020-04-20 NOTE — TELEPHONE ENCOUNTER
Left patient a message to call,  Need to go over Surgery details, also she needs to come to office  On 5-26-20 before 10:00 to sign Surgery Consent.    Christi

## 2020-04-27 ENCOUNTER — TELEPHONE (OUTPATIENT)
Dept: SURGERY | Facility: CLINIC | Age: 45
End: 2020-04-27

## 2020-04-27 NOTE — TELEPHONE ENCOUNTER
Surgery moved up to 5-6-2020 Main OR    Bilateral Total Mastectomy, Bilateral Axilla [place] Node Biopsy, Possible Left Axillary Node Dissection, Possible Reconstruction- Dr Guevara      Arrive       5:15  RT SI  LT SI  7:00 and 7:15    Surgery  8:00    PAT  4-28-20 @ 3:00    Post op 5- @ 8:30    Jina to assist.      Pt v/u    tree

## 2020-04-28 ENCOUNTER — TELEPHONE (OUTPATIENT)
Dept: SURGERY | Facility: CLINIC | Age: 45
End: 2020-04-28

## 2020-04-28 ENCOUNTER — APPOINTMENT (OUTPATIENT)
Dept: PREADMISSION TESTING | Facility: HOSPITAL | Age: 45
End: 2020-04-28

## 2020-04-28 ENCOUNTER — HOSPITAL ENCOUNTER (OUTPATIENT)
Dept: GENERAL RADIOLOGY | Facility: HOSPITAL | Age: 45
Discharge: HOME | End: 2020-04-28
Admitting: SURGERY

## 2020-04-28 VITALS
TEMPERATURE: 98.4 F | WEIGHT: 162.5 LBS | BODY MASS INDEX: 27.74 KG/M2 | HEIGHT: 64 IN | RESPIRATION RATE: 18 BRPM | OXYGEN SATURATION: 100 % | DIASTOLIC BLOOD PRESSURE: 85 MMHG | HEART RATE: 94 BPM | SYSTOLIC BLOOD PRESSURE: 136 MMHG

## 2020-04-28 DIAGNOSIS — Z17.0 CARCINOMA OF UPPER-INNER QUADRANT OF LEFT BREAST IN FEMALE, ESTROGEN RECEPTOR POSITIVE (HCC): ICD-10-CM

## 2020-04-28 DIAGNOSIS — C50.212 CARCINOMA OF UPPER-INNER QUADRANT OF LEFT BREAST IN FEMALE, ESTROGEN RECEPTOR POSITIVE (HCC): ICD-10-CM

## 2020-04-28 LAB
ALBUMIN SERPL-MCNC: 4.5 G/DL (ref 3.5–5.2)
ALBUMIN/GLOB SERPL: 1.6 G/DL
ALP SERPL-CCNC: 79 U/L (ref 39–117)
ALT SERPL W P-5'-P-CCNC: 36 U/L (ref 1–33)
ANION GAP SERPL CALCULATED.3IONS-SCNC: 9.5 MMOL/L (ref 5–15)
AST SERPL-CCNC: 23 U/L (ref 1–32)
BILIRUB SERPL-MCNC: 0.9 MG/DL (ref 0.2–1.2)
BUN BLD-MCNC: 11 MG/DL (ref 6–20)
BUN/CREAT SERPL: 12.9 (ref 7–25)
CALCIUM SPEC-SCNC: 9.4 MG/DL (ref 8.6–10.5)
CHLORIDE SERPL-SCNC: 101 MMOL/L (ref 98–107)
CO2 SERPL-SCNC: 25.5 MMOL/L (ref 22–29)
CREAT BLD-MCNC: 0.85 MG/DL (ref 0.57–1)
DEPRECATED RDW RBC AUTO: 52.7 FL (ref 37–54)
ERYTHROCYTE [DISTWIDTH] IN BLOOD BY AUTOMATED COUNT: 14 % (ref 12.3–15.4)
GFR SERPL CREATININE-BSD FRML MDRD: 72 ML/MIN/1.73
GLOBULIN UR ELPH-MCNC: 2.8 GM/DL
GLUCOSE BLD-MCNC: 89 MG/DL (ref 65–99)
HCT VFR BLD AUTO: 44.2 % (ref 34–46.6)
HGB BLD-MCNC: 16 G/DL (ref 12–15.9)
MCH RBC QN AUTO: 37.6 PG (ref 26.6–33)
MCHC RBC AUTO-ENTMCNC: 36.2 G/DL (ref 31.5–35.7)
MCV RBC AUTO: 103.8 FL (ref 79–97)
PLATELET # BLD AUTO: 211 10*3/MM3 (ref 140–450)
PMV BLD AUTO: 9.6 FL (ref 6–12)
POTASSIUM BLD-SCNC: 4.2 MMOL/L (ref 3.5–5.2)
PROT SERPL-MCNC: 7.3 G/DL (ref 6–8.5)
RBC # BLD AUTO: 4.26 10*6/MM3 (ref 3.77–5.28)
SODIUM BLD-SCNC: 136 MMOL/L (ref 136–145)
WBC NRBC COR # BLD: 6.51 10*3/MM3 (ref 3.4–10.8)

## 2020-04-28 PROCEDURE — 36415 COLL VENOUS BLD VENIPUNCTURE: CPT

## 2020-04-28 PROCEDURE — 85027 COMPLETE CBC AUTOMATED: CPT | Performed by: SURGERY

## 2020-04-28 PROCEDURE — 71046 X-RAY EXAM CHEST 2 VIEWS: CPT

## 2020-04-28 PROCEDURE — 80053 COMPREHEN METABOLIC PANEL: CPT | Performed by: SURGERY

## 2020-04-28 RX ORDER — DIAZEPAM 2 MG/1
2 TABLET ORAL 3 TIMES DAILY
COMMUNITY
End: 2020-05-07 | Stop reason: HOSPADM

## 2020-04-28 RX ORDER — CHLORHEXIDINE GLUCONATE 500 MG/1
CLOTH TOPICAL
COMMUNITY
End: 2020-05-07 | Stop reason: HOSPADM

## 2020-04-28 RX ORDER — DESVENLAFAXINE 25 MG/1
50 TABLET, EXTENDED RELEASE ORAL DAILY
COMMUNITY
End: 2021-01-18

## 2020-04-28 RX ORDER — ANASTROZOLE 1 MG/1
1 TABLET ORAL DAILY
COMMUNITY

## 2020-04-30 ENCOUNTER — APPOINTMENT (OUTPATIENT)
Dept: PREADMISSION TESTING | Facility: HOSPITAL | Age: 45
End: 2020-04-30

## 2020-05-04 ENCOUNTER — LAB (OUTPATIENT)
Dept: LAB | Facility: HOSPITAL | Age: 45
End: 2020-05-04

## 2020-05-04 ENCOUNTER — TELEPHONE (OUTPATIENT)
Dept: SURGERY | Facility: CLINIC | Age: 45
End: 2020-05-04

## 2020-05-04 ENCOUNTER — DOCUMENTATION (OUTPATIENT)
Dept: INTERNAL MEDICINE | Facility: HOSPITAL | Age: 45
End: 2020-05-04

## 2020-05-04 DIAGNOSIS — Z01.818 PREOP TESTING: Primary | ICD-10-CM

## 2020-05-04 DIAGNOSIS — Z01.818 PREOP TESTING: ICD-10-CM

## 2020-05-04 PROCEDURE — U0002 COVID-19 LAB TEST NON-CDC: HCPCS

## 2020-05-04 NOTE — TELEPHONE ENCOUNTER
EMLA cream 320 g tube no refills   Apply topically once for one dose to affected nipple outer edge of affected nipple and cover day of surgery before leaving home.

## 2020-05-05 ENCOUNTER — TELEPHONE (OUTPATIENT)
Dept: SURGERY | Facility: CLINIC | Age: 45
End: 2020-05-05

## 2020-05-06 ENCOUNTER — HOSPITAL ENCOUNTER (OUTPATIENT)
Dept: NUCLEAR MEDICINE | Facility: HOSPITAL | Age: 45
Discharge: HOME | End: 2020-05-06

## 2020-05-06 ENCOUNTER — ANESTHESIA (OUTPATIENT)
Dept: PERIOP | Facility: HOSPITAL | Age: 45
End: 2020-05-06

## 2020-05-06 ENCOUNTER — HOSPITAL ENCOUNTER (OUTPATIENT)
Facility: HOSPITAL | Age: 45
Discharge: HOME | End: 2020-05-07
Attending: SURGERY | Admitting: SURGERY

## 2020-05-06 ENCOUNTER — ANESTHESIA EVENT (OUTPATIENT)
Dept: PERIOP | Facility: HOSPITAL | Age: 45
End: 2020-05-06

## 2020-05-06 DIAGNOSIS — Z17.0 CARCINOMA OF UPPER-INNER QUADRANT OF LEFT BREAST IN FEMALE, ESTROGEN RECEPTOR POSITIVE (HCC): ICD-10-CM

## 2020-05-06 DIAGNOSIS — C50.212 CARCINOMA OF UPPER-INNER QUADRANT OF LEFT BREAST IN FEMALE, ESTROGEN RECEPTOR POSITIVE (HCC): ICD-10-CM

## 2020-05-06 LAB
REF LAB TEST METHOD: NORMAL
SARS-COV-2 RNA RESP QL NAA+PROBE: NOT DETECTED

## 2020-05-06 PROCEDURE — 63710000001 DIPHENHYDRAMINE PER 50 MG: Performed by: SURGERY

## 2020-05-06 PROCEDURE — 88331 PATH CONSLTJ SURG 1 BLK 1SPC: CPT | Performed by: SURGERY

## 2020-05-06 PROCEDURE — 63710000001 ONDANSETRON PER 8 MG: Performed by: SURGERY

## 2020-05-06 PROCEDURE — 38792 RA TRACER ID OF SENTINL NODE: CPT

## 2020-05-06 PROCEDURE — 25010000002 FENTANYL CITRATE (PF) 100 MCG/2ML SOLUTION: Performed by: NURSE ANESTHETIST, CERTIFIED REGISTERED

## 2020-05-06 PROCEDURE — 25010000002 HYDROMORPHONE PER 4 MG: Performed by: NURSE ANESTHETIST, CERTIFIED REGISTERED

## 2020-05-06 PROCEDURE — 88307 TISSUE EXAM BY PATHOLOGIST: CPT | Performed by: SURGERY

## 2020-05-06 PROCEDURE — 25010000002 NEOSTIGMINE PER 0.5 MG: Performed by: NURSE ANESTHETIST, CERTIFIED REGISTERED

## 2020-05-06 PROCEDURE — 38525 BIOPSY/REMOVAL LYMPH NODES: CPT | Performed by: SURGERY

## 2020-05-06 PROCEDURE — 94799 UNLISTED PULMONARY SVC/PX: CPT

## 2020-05-06 PROCEDURE — A9541 TC99M SULFUR COLLOID: HCPCS | Performed by: SURGERY

## 2020-05-06 PROCEDURE — 88342 IMHCHEM/IMCYTCHM 1ST ANTB: CPT | Performed by: SURGERY

## 2020-05-06 PROCEDURE — 25010000003 CEFAZOLIN IN DEXTROSE 2-4 GM/100ML-% SOLUTION: Performed by: SURGERY

## 2020-05-06 PROCEDURE — 0 TECHNETIUM FILTERED SULFUR COLLOID: Performed by: SURGERY

## 2020-05-06 PROCEDURE — 25010000002 PROPOFOL 10 MG/ML EMULSION: Performed by: NURSE ANESTHETIST, CERTIFIED REGISTERED

## 2020-05-06 PROCEDURE — 25010000002 EPINEPHRINE PER 0.1 MG: Performed by: SURGERY

## 2020-05-06 PROCEDURE — 25010000002 DEXAMETHASONE PER 1 MG: Performed by: NURSE ANESTHETIST, CERTIFIED REGISTERED

## 2020-05-06 PROCEDURE — 25010000002 GENTAMICIN PER 80 MG: Performed by: PLASTIC SURGERY

## 2020-05-06 PROCEDURE — 25010000002 MIDAZOLAM PER 1 MG: Performed by: ANESTHESIOLOGY

## 2020-05-06 PROCEDURE — G0378 HOSPITAL OBSERVATION PER HR: HCPCS

## 2020-05-06 PROCEDURE — C1789 PROSTHESIS, BREAST, IMP: HCPCS | Performed by: SURGERY

## 2020-05-06 PROCEDURE — 19303 MAST SIMPLE COMPLETE: CPT | Performed by: SURGERY

## 2020-05-06 PROCEDURE — 25010000002 HYDROMORPHONE PER 4 MG: Performed by: SURGERY

## 2020-05-06 PROCEDURE — 25010000002 SUCCINYLCHOLINE PER 20 MG: Performed by: NURSE ANESTHETIST, CERTIFIED REGISTERED

## 2020-05-06 PROCEDURE — 25010000002 ONDANSETRON PER 1 MG: Performed by: NURSE ANESTHETIST, CERTIFIED REGISTERED

## 2020-05-06 PROCEDURE — 78195 LYMPH SYSTEM IMAGING: CPT | Performed by: SURGERY

## 2020-05-06 DEVICE — CLIP LIGAT VASC HORIZON TI SM/WD RED 24CT: Type: IMPLANTABLE DEVICE | Site: BREAST | Status: FUNCTIONAL

## 2020-05-06 DEVICE — GRFT TISS ALLODERM RTM PERF LG 10.7X21.5CM THK: Type: IMPLANTABLE DEVICE | Site: BREAST | Status: FUNCTIONAL

## 2020-05-06 DEVICE — CLIP LIGAT VASC HORIZON TI SM/WD RD 6CT: Type: IMPLANTABLE DEVICE | Site: BREAST | Status: FUNCTIONAL

## 2020-05-06 DEVICE — CLIP LIGAT VASC HORIZON TI MD BLU 24CT: Type: IMPLANTABLE DEVICE | Site: BREAST | Status: FUNCTIONAL

## 2020-05-06 DEVICE — CLIP LIGAT VASC HORIZON TI MD BLU 6CT: Type: IMPLANTABLE DEVICE | Site: BREAST | Status: FUNCTIONAL

## 2020-05-06 DEVICE — TISSUE EXPANDER, SMOOTH SURFACE, INTEGRATED PORTS, FULL HEIGHT, 600-720 CC
Type: IMPLANTABLE DEVICE | Site: BREAST | Status: FUNCTIONAL
Brand: ALLOX2 TISSUE EXPANDER

## 2020-05-06 RX ORDER — ACETAMINOPHEN 650 MG/1
650 SUPPOSITORY RECTAL EVERY 4 HOURS PRN
Status: DISCONTINUED | OUTPATIENT
Start: 2020-05-06 | End: 2020-05-07 | Stop reason: HOSPADM

## 2020-05-06 RX ORDER — MIDAZOLAM HYDROCHLORIDE 1 MG/ML
1 INJECTION INTRAMUSCULAR; INTRAVENOUS
Status: DISCONTINUED | OUTPATIENT
Start: 2020-05-06 | End: 2020-05-06 | Stop reason: HOSPADM

## 2020-05-06 RX ORDER — PROPOFOL 10 MG/ML
VIAL (ML) INTRAVENOUS AS NEEDED
Status: DISCONTINUED | OUTPATIENT
Start: 2020-05-06 | End: 2020-05-06 | Stop reason: SURG

## 2020-05-06 RX ORDER — FENTANYL CITRATE 50 UG/ML
50 INJECTION, SOLUTION INTRAMUSCULAR; INTRAVENOUS
Status: DISCONTINUED | OUTPATIENT
Start: 2020-05-06 | End: 2020-05-06 | Stop reason: HOSPADM

## 2020-05-06 RX ORDER — ALBUTEROL SULFATE 2.5 MG/3ML
2.5 SOLUTION RESPIRATORY (INHALATION) EVERY 4 HOURS PRN
Status: DISCONTINUED | OUTPATIENT
Start: 2020-05-06 | End: 2020-05-07 | Stop reason: HOSPADM

## 2020-05-06 RX ORDER — CEFAZOLIN SODIUM 2 G/100ML
2 INJECTION, SOLUTION INTRAVENOUS EVERY 8 HOURS
Status: COMPLETED | OUTPATIENT
Start: 2020-05-06 | End: 2020-05-06

## 2020-05-06 RX ORDER — PROMETHAZINE HYDROCHLORIDE 25 MG/ML
6.25 INJECTION, SOLUTION INTRAMUSCULAR; INTRAVENOUS
Status: DISCONTINUED | OUTPATIENT
Start: 2020-05-06 | End: 2020-05-06 | Stop reason: HOSPADM

## 2020-05-06 RX ORDER — FENTANYL CITRATE 50 UG/ML
INJECTION, SOLUTION INTRAMUSCULAR; INTRAVENOUS AS NEEDED
Status: DISCONTINUED | OUTPATIENT
Start: 2020-05-06 | End: 2020-05-06 | Stop reason: SURG

## 2020-05-06 RX ORDER — DIPHENHYDRAMINE HCL 25 MG
25 CAPSULE ORAL
Status: DISCONTINUED | OUTPATIENT
Start: 2020-05-06 | End: 2020-05-06 | Stop reason: HOSPADM

## 2020-05-06 RX ORDER — HYDROCODONE BITARTRATE AND ACETAMINOPHEN 5; 325 MG/1; MG/1
1 TABLET ORAL EVERY 4 HOURS PRN
Status: DISCONTINUED | OUTPATIENT
Start: 2020-05-06 | End: 2020-05-07 | Stop reason: HOSPADM

## 2020-05-06 RX ORDER — DEXAMETHASONE SODIUM PHOSPHATE 10 MG/ML
INJECTION INTRAMUSCULAR; INTRAVENOUS AS NEEDED
Status: DISCONTINUED | OUTPATIENT
Start: 2020-05-06 | End: 2020-05-06 | Stop reason: SURG

## 2020-05-06 RX ORDER — DIPHENHYDRAMINE HYDROCHLORIDE 50 MG/ML
12.5 INJECTION INTRAMUSCULAR; INTRAVENOUS
Status: DISCONTINUED | OUTPATIENT
Start: 2020-05-06 | End: 2020-05-06 | Stop reason: HOSPADM

## 2020-05-06 RX ORDER — ACETAMINOPHEN 325 MG/1
650 TABLET ORAL ONCE AS NEEDED
Status: DISCONTINUED | OUTPATIENT
Start: 2020-05-06 | End: 2020-05-06 | Stop reason: HOSPADM

## 2020-05-06 RX ORDER — PROMETHAZINE HYDROCHLORIDE 25 MG/ML
12.5 INJECTION, SOLUTION INTRAMUSCULAR; INTRAVENOUS EVERY 6 HOURS PRN
Status: DISCONTINUED | OUTPATIENT
Start: 2020-05-06 | End: 2020-05-07 | Stop reason: HOSPADM

## 2020-05-06 RX ORDER — HYDROMORPHONE HCL 110MG/55ML
PATIENT CONTROLLED ANALGESIA SYRINGE INTRAVENOUS AS NEEDED
Status: DISCONTINUED | OUTPATIENT
Start: 2020-05-06 | End: 2020-05-06 | Stop reason: SURG

## 2020-05-06 RX ORDER — FLUMAZENIL 0.1 MG/ML
0.2 INJECTION INTRAVENOUS AS NEEDED
Status: DISCONTINUED | OUTPATIENT
Start: 2020-05-06 | End: 2020-05-06 | Stop reason: HOSPADM

## 2020-05-06 RX ORDER — EPHEDRINE SULFATE 50 MG/ML
5 INJECTION, SOLUTION INTRAVENOUS ONCE AS NEEDED
Status: DISCONTINUED | OUTPATIENT
Start: 2020-05-06 | End: 2020-05-06 | Stop reason: HOSPADM

## 2020-05-06 RX ORDER — CALCIUM POLYCARBOPHIL 625 MG 625 MG/1
1250 TABLET ORAL 2 TIMES DAILY PRN
Status: DISCONTINUED | OUTPATIENT
Start: 2020-05-06 | End: 2020-05-07 | Stop reason: HOSPADM

## 2020-05-06 RX ORDER — DESVENLAFAXINE 25 MG/1
25 TABLET, EXTENDED RELEASE ORAL DAILY
Status: DISCONTINUED | OUTPATIENT
Start: 2020-05-06 | End: 2020-05-07 | Stop reason: HOSPADM

## 2020-05-06 RX ORDER — DIAZEPAM 5 MG/1
10 TABLET ORAL ONCE
Status: COMPLETED | OUTPATIENT
Start: 2020-05-06 | End: 2020-05-06

## 2020-05-06 RX ORDER — ACETAMINOPHEN 325 MG/1
650 TABLET ORAL EVERY 4 HOURS PRN
Status: DISCONTINUED | OUTPATIENT
Start: 2020-05-06 | End: 2020-05-07 | Stop reason: HOSPADM

## 2020-05-06 RX ORDER — PROMETHAZINE HYDROCHLORIDE 25 MG/ML
12.5 INJECTION, SOLUTION INTRAMUSCULAR; INTRAVENOUS ONCE AS NEEDED
Status: DISCONTINUED | OUTPATIENT
Start: 2020-05-06 | End: 2020-05-06 | Stop reason: HOSPADM

## 2020-05-06 RX ORDER — NALOXONE HCL 0.4 MG/ML
0.2 VIAL (ML) INJECTION AS NEEDED
Status: DISCONTINUED | OUTPATIENT
Start: 2020-05-06 | End: 2020-05-06 | Stop reason: HOSPADM

## 2020-05-06 RX ORDER — SODIUM CHLORIDE 0.9 % (FLUSH) 0.9 %
3-10 SYRINGE (ML) INJECTION AS NEEDED
Status: DISCONTINUED | OUTPATIENT
Start: 2020-05-06 | End: 2020-05-06 | Stop reason: HOSPADM

## 2020-05-06 RX ORDER — SODIUM CHLORIDE, SODIUM LACTATE, POTASSIUM CHLORIDE, CALCIUM CHLORIDE 600; 310; 30; 20 MG/100ML; MG/100ML; MG/100ML; MG/100ML
100 INJECTION, SOLUTION INTRAVENOUS CONTINUOUS
Status: DISCONTINUED | OUTPATIENT
Start: 2020-05-06 | End: 2020-05-06

## 2020-05-06 RX ORDER — FAMOTIDINE 10 MG/ML
20 INJECTION, SOLUTION INTRAVENOUS ONCE
Status: COMPLETED | OUTPATIENT
Start: 2020-05-06 | End: 2020-05-06

## 2020-05-06 RX ORDER — GLYCOPYRROLATE 0.2 MG/ML
INJECTION INTRAMUSCULAR; INTRAVENOUS AS NEEDED
Status: DISCONTINUED | OUTPATIENT
Start: 2020-05-06 | End: 2020-05-06 | Stop reason: SURG

## 2020-05-06 RX ORDER — DIAZEPAM 5 MG/1
5 TABLET ORAL EVERY 8 HOURS SCHEDULED
Status: DISCONTINUED | OUTPATIENT
Start: 2020-05-06 | End: 2020-05-07 | Stop reason: HOSPADM

## 2020-05-06 RX ORDER — PROMETHAZINE HYDROCHLORIDE 25 MG/1
25 TABLET ORAL ONCE AS NEEDED
Status: DISCONTINUED | OUTPATIENT
Start: 2020-05-06 | End: 2020-05-06 | Stop reason: HOSPADM

## 2020-05-06 RX ORDER — HYDROCODONE BITARTRATE AND ACETAMINOPHEN 7.5; 325 MG/1; MG/1
1 TABLET ORAL ONCE AS NEEDED
Status: DISCONTINUED | OUTPATIENT
Start: 2020-05-06 | End: 2020-05-06 | Stop reason: HOSPADM

## 2020-05-06 RX ORDER — CEFAZOLIN SODIUM 2 G/100ML
2 INJECTION, SOLUTION INTRAVENOUS ONCE
Status: COMPLETED | OUTPATIENT
Start: 2020-05-06 | End: 2020-05-06

## 2020-05-06 RX ORDER — HYDROMORPHONE HYDROCHLORIDE 1 MG/ML
0.5 INJECTION, SOLUTION INTRAMUSCULAR; INTRAVENOUS; SUBCUTANEOUS
Status: DISCONTINUED | OUTPATIENT
Start: 2020-05-06 | End: 2020-05-06 | Stop reason: HOSPADM

## 2020-05-06 RX ORDER — DIPHENHYDRAMINE HCL 25 MG
50 CAPSULE ORAL EVERY 6 HOURS PRN
Status: DISCONTINUED | OUTPATIENT
Start: 2020-05-06 | End: 2020-05-07 | Stop reason: HOSPADM

## 2020-05-06 RX ORDER — OXYCODONE AND ACETAMINOPHEN 7.5; 325 MG/1; MG/1
1 TABLET ORAL ONCE AS NEEDED
Status: DISCONTINUED | OUTPATIENT
Start: 2020-05-06 | End: 2020-05-06 | Stop reason: HOSPADM

## 2020-05-06 RX ORDER — SUCCINYLCHOLINE CHLORIDE 20 MG/ML
INJECTION INTRAMUSCULAR; INTRAVENOUS AS NEEDED
Status: DISCONTINUED | OUTPATIENT
Start: 2020-05-06 | End: 2020-05-06 | Stop reason: SURG

## 2020-05-06 RX ORDER — METOCLOPRAMIDE HYDROCHLORIDE 5 MG/ML
10 INJECTION INTRAMUSCULAR; INTRAVENOUS EVERY 6 HOURS PRN
Status: DISCONTINUED | OUTPATIENT
Start: 2020-05-06 | End: 2020-05-07 | Stop reason: HOSPADM

## 2020-05-06 RX ORDER — HYDROMORPHONE HYDROCHLORIDE 1 MG/ML
0.5 INJECTION, SOLUTION INTRAMUSCULAR; INTRAVENOUS; SUBCUTANEOUS
Status: DISCONTINUED | OUTPATIENT
Start: 2020-05-06 | End: 2020-05-07 | Stop reason: HOSPADM

## 2020-05-06 RX ORDER — HYDROCODONE BITARTRATE AND ACETAMINOPHEN 5; 325 MG/1; MG/1
2 TABLET ORAL EVERY 4 HOURS PRN
Status: DISCONTINUED | OUTPATIENT
Start: 2020-05-06 | End: 2020-05-07 | Stop reason: HOSPADM

## 2020-05-06 RX ORDER — SODIUM CHLORIDE 9 MG/ML
INJECTION, SOLUTION INTRAVENOUS AS NEEDED
Status: DISCONTINUED | OUTPATIENT
Start: 2020-05-06 | End: 2020-05-06 | Stop reason: HOSPADM

## 2020-05-06 RX ORDER — ONDANSETRON 2 MG/ML
4 INJECTION INTRAMUSCULAR; INTRAVENOUS ONCE AS NEEDED
Status: DISCONTINUED | OUTPATIENT
Start: 2020-05-06 | End: 2020-05-06 | Stop reason: HOSPADM

## 2020-05-06 RX ORDER — PROMETHAZINE HYDROCHLORIDE 25 MG/1
25 SUPPOSITORY RECTAL ONCE AS NEEDED
Status: DISCONTINUED | OUTPATIENT
Start: 2020-05-06 | End: 2020-05-06 | Stop reason: HOSPADM

## 2020-05-06 RX ORDER — HYDRALAZINE HYDROCHLORIDE 20 MG/ML
5 INJECTION INTRAMUSCULAR; INTRAVENOUS
Status: DISCONTINUED | OUTPATIENT
Start: 2020-05-06 | End: 2020-05-06 | Stop reason: HOSPADM

## 2020-05-06 RX ORDER — MIDAZOLAM HYDROCHLORIDE 1 MG/ML
2 INJECTION INTRAMUSCULAR; INTRAVENOUS
Status: DISCONTINUED | OUTPATIENT
Start: 2020-05-06 | End: 2020-05-06 | Stop reason: HOSPADM

## 2020-05-06 RX ORDER — SODIUM CHLORIDE, SODIUM LACTATE, POTASSIUM CHLORIDE, CALCIUM CHLORIDE 600; 310; 30; 20 MG/100ML; MG/100ML; MG/100ML; MG/100ML
9 INJECTION, SOLUTION INTRAVENOUS CONTINUOUS
Status: DISCONTINUED | OUTPATIENT
Start: 2020-05-06 | End: 2020-05-06

## 2020-05-06 RX ORDER — NALOXONE HCL 0.4 MG/ML
0.1 VIAL (ML) INJECTION
Status: DISCONTINUED | OUTPATIENT
Start: 2020-05-06 | End: 2020-05-07 | Stop reason: HOSPADM

## 2020-05-06 RX ORDER — ONDANSETRON 4 MG/1
4 TABLET, FILM COATED ORAL EVERY 6 HOURS PRN
Status: DISCONTINUED | OUTPATIENT
Start: 2020-05-06 | End: 2020-05-07 | Stop reason: HOSPADM

## 2020-05-06 RX ORDER — DEXTROSE, SODIUM CHLORIDE, AND POTASSIUM CHLORIDE 5; .45; .15 G/100ML; G/100ML; G/100ML
75 INJECTION INTRAVENOUS CONTINUOUS
Status: DISCONTINUED | OUTPATIENT
Start: 2020-05-06 | End: 2020-05-07 | Stop reason: HOSPADM

## 2020-05-06 RX ORDER — ONDANSETRON 2 MG/ML
INJECTION INTRAMUSCULAR; INTRAVENOUS AS NEEDED
Status: DISCONTINUED | OUTPATIENT
Start: 2020-05-06 | End: 2020-05-06 | Stop reason: SURG

## 2020-05-06 RX ORDER — ROCURONIUM BROMIDE 10 MG/ML
INJECTION, SOLUTION INTRAVENOUS AS NEEDED
Status: DISCONTINUED | OUTPATIENT
Start: 2020-05-06 | End: 2020-05-06 | Stop reason: SURG

## 2020-05-06 RX ORDER — SODIUM CHLORIDE 0.9 % (FLUSH) 0.9 %
3 SYRINGE (ML) INJECTION EVERY 12 HOURS SCHEDULED
Status: DISCONTINUED | OUTPATIENT
Start: 2020-05-06 | End: 2020-05-06 | Stop reason: HOSPADM

## 2020-05-06 RX ORDER — LIDOCAINE HYDROCHLORIDE 10 MG/ML
0.5 INJECTION, SOLUTION EPIDURAL; INFILTRATION; INTRACAUDAL; PERINEURAL ONCE AS NEEDED
Status: DISCONTINUED | OUTPATIENT
Start: 2020-05-06 | End: 2020-05-06 | Stop reason: HOSPADM

## 2020-05-06 RX ORDER — ONDANSETRON 2 MG/ML
4 INJECTION INTRAMUSCULAR; INTRAVENOUS EVERY 6 HOURS PRN
Status: DISCONTINUED | OUTPATIENT
Start: 2020-05-06 | End: 2020-05-07 | Stop reason: HOSPADM

## 2020-05-06 RX ORDER — SCOLOPAMINE TRANSDERMAL SYSTEM 1 MG/1
1 PATCH, EXTENDED RELEASE TRANSDERMAL
Status: DISCONTINUED | OUTPATIENT
Start: 2020-05-06 | End: 2020-05-06 | Stop reason: HOSPADM

## 2020-05-06 RX ORDER — LIDOCAINE HYDROCHLORIDE 20 MG/ML
INJECTION, SOLUTION INFILTRATION; PERINEURAL AS NEEDED
Status: DISCONTINUED | OUTPATIENT
Start: 2020-05-06 | End: 2020-05-06 | Stop reason: SURG

## 2020-05-06 RX ORDER — LABETALOL HYDROCHLORIDE 5 MG/ML
5 INJECTION, SOLUTION INTRAVENOUS
Status: DISCONTINUED | OUTPATIENT
Start: 2020-05-06 | End: 2020-05-06 | Stop reason: HOSPADM

## 2020-05-06 RX ADMIN — HYDROMORPHONE HYDROCHLORIDE 0.5 MG: 2 INJECTION, SOLUTION INTRAMUSCULAR; INTRAVENOUS; SUBCUTANEOUS at 09:29

## 2020-05-06 RX ADMIN — ROCURONIUM BROMIDE 5 MG: 10 INJECTION, SOLUTION INTRAVENOUS at 08:03

## 2020-05-06 RX ADMIN — MIDAZOLAM 1 MG: 1 INJECTION INTRAMUSCULAR; INTRAVENOUS at 07:49

## 2020-05-06 RX ADMIN — CEFAZOLIN SODIUM 2 G: 2 INJECTION, SOLUTION INTRAVENOUS at 08:15

## 2020-05-06 RX ADMIN — DIAZEPAM 5 MG: 5 TABLET ORAL at 22:29

## 2020-05-06 RX ADMIN — DESVENLAFAXINE SUCCINATE 25 MG: 25 TABLET, EXTENDED RELEASE ORAL at 17:50

## 2020-05-06 RX ADMIN — HYDROMORPHONE HYDROCHLORIDE 0.5 MG: 2 INJECTION, SOLUTION INTRAMUSCULAR; INTRAVENOUS; SUBCUTANEOUS at 09:43

## 2020-05-06 RX ADMIN — ONDANSETRON HYDROCHLORIDE 4 MG: 2 SOLUTION INTRAMUSCULAR; INTRAVENOUS at 11:40

## 2020-05-06 RX ADMIN — TECHNETIUM TC 99M SULFUR COLLOID 1 DOSE: KIT at 07:15

## 2020-05-06 RX ADMIN — SCOPALAMINE 1 PATCH: 1 PATCH, EXTENDED RELEASE TRANSDERMAL at 06:30

## 2020-05-06 RX ADMIN — MIDAZOLAM 1 MG: 1 INJECTION INTRAMUSCULAR; INTRAVENOUS at 07:21

## 2020-05-06 RX ADMIN — HYDROCODONE BITARTRATE AND ACETAMINOPHEN 2 TABLET: 5; 325 TABLET ORAL at 23:19

## 2020-05-06 RX ADMIN — POTASSIUM CHLORIDE, DEXTROSE MONOHYDRATE AND SODIUM CHLORIDE 75 ML/HR: 150; 5; 450 INJECTION, SOLUTION INTRAVENOUS at 15:47

## 2020-05-06 RX ADMIN — HYDROMORPHONE HYDROCHLORIDE 0.5 MG: 1 INJECTION, SOLUTION INTRAMUSCULAR; INTRAVENOUS; SUBCUTANEOUS at 13:12

## 2020-05-06 RX ADMIN — ONDANSETRON HYDROCHLORIDE 4 MG: 4 TABLET, FILM COATED ORAL at 23:24

## 2020-05-06 RX ADMIN — ROCURONIUM BROMIDE 35 MG: 10 INJECTION, SOLUTION INTRAVENOUS at 08:15

## 2020-05-06 RX ADMIN — SODIUM CHLORIDE, POTASSIUM CHLORIDE, SODIUM LACTATE AND CALCIUM CHLORIDE: 600; 310; 30; 20 INJECTION, SOLUTION INTRAVENOUS at 09:17

## 2020-05-06 RX ADMIN — HYDROMORPHONE HYDROCHLORIDE 0.5 MG: 1 INJECTION, SOLUTION INTRAMUSCULAR; INTRAVENOUS; SUBCUTANEOUS at 17:51

## 2020-05-06 RX ADMIN — DIAZEPAM 10 MG: 5 TABLET ORAL at 06:14

## 2020-05-06 RX ADMIN — TECHNETIUM TC 99M SULFUR COLLOID 1 DOSE: KIT at 07:16

## 2020-05-06 RX ADMIN — FENTANYL CITRATE 50 MCG: 50 INJECTION INTRAMUSCULAR; INTRAVENOUS at 08:10

## 2020-05-06 RX ADMIN — FAMOTIDINE 20 MG: 10 INJECTION, SOLUTION INTRAVENOUS at 06:30

## 2020-05-06 RX ADMIN — CEFAZOLIN SODIUM 2 G: 2 INJECTION, SOLUTION INTRAVENOUS at 23:19

## 2020-05-06 RX ADMIN — NEOSTIGMINE METHYLSULFATE 3 MG: 1 INJECTION INTRAMUSCULAR; INTRAVENOUS; SUBCUTANEOUS at 12:01

## 2020-05-06 RX ADMIN — LIDOCAINE HYDROCHLORIDE 100 MG: 20 INJECTION, SOLUTION INFILTRATION; PERINEURAL at 08:03

## 2020-05-06 RX ADMIN — CEFAZOLIN SODIUM 2 G: 2 INJECTION, SOLUTION INTRAVENOUS at 17:44

## 2020-05-06 RX ADMIN — DEXAMETHASONE SODIUM PHOSPHATE 8 MG: 10 INJECTION INTRAMUSCULAR; INTRAVENOUS at 09:00

## 2020-05-06 RX ADMIN — FENTANYL CITRATE 50 MCG: 50 INJECTION INTRAMUSCULAR; INTRAVENOUS at 08:52

## 2020-05-06 RX ADMIN — HYDROMORPHONE HYDROCHLORIDE 0.5 MG: 1 INJECTION, SOLUTION INTRAMUSCULAR; INTRAVENOUS; SUBCUTANEOUS at 12:57

## 2020-05-06 RX ADMIN — SODIUM CHLORIDE, POTASSIUM CHLORIDE, SODIUM LACTATE AND CALCIUM CHLORIDE 9 ML/HR: 600; 310; 30; 20 INJECTION, SOLUTION INTRAVENOUS at 06:30

## 2020-05-06 RX ADMIN — ROCURONIUM BROMIDE 20 MG: 10 INJECTION, SOLUTION INTRAVENOUS at 10:04

## 2020-05-06 RX ADMIN — HYDROMORPHONE HYDROCHLORIDE 0.5 MG: 1 INJECTION, SOLUTION INTRAMUSCULAR; INTRAVENOUS; SUBCUTANEOUS at 20:49

## 2020-05-06 RX ADMIN — FENTANYL CITRATE 100 MCG: 50 INJECTION INTRAMUSCULAR; INTRAVENOUS at 10:11

## 2020-05-06 RX ADMIN — FENTANYL CITRATE 50 MCG: 50 INJECTION, SOLUTION INTRAMUSCULAR; INTRAVENOUS at 12:43

## 2020-05-06 RX ADMIN — GLYCOPYRROLATE 0.4 MG: 0.2 INJECTION INTRAMUSCULAR; INTRAVENOUS at 12:01

## 2020-05-06 RX ADMIN — HYDROMORPHONE HYDROCHLORIDE 0.5 MG: 1 INJECTION, SOLUTION INTRAMUSCULAR; INTRAVENOUS; SUBCUTANEOUS at 13:51

## 2020-05-06 RX ADMIN — ROCURONIUM BROMIDE 10 MG: 10 INJECTION, SOLUTION INTRAVENOUS at 09:47

## 2020-05-06 RX ADMIN — FENTANYL CITRATE 150 MCG: 50 INJECTION INTRAMUSCULAR; INTRAVENOUS at 08:00

## 2020-05-06 RX ADMIN — PROPOFOL 200 MG: 10 INJECTION, EMULSION INTRAVENOUS at 08:03

## 2020-05-06 RX ADMIN — HYDROMORPHONE HYDROCHLORIDE 0.5 MG: 1 INJECTION, SOLUTION INTRAMUSCULAR; INTRAVENOUS; SUBCUTANEOUS at 15:47

## 2020-05-06 RX ADMIN — DIPHENHYDRAMINE HYDROCHLORIDE 50 MG: 25 CAPSULE ORAL at 15:48

## 2020-05-06 RX ADMIN — SUCCINYLCHOLINE CHLORIDE 120 MG: 20 INJECTION, SOLUTION INTRAMUSCULAR; INTRAVENOUS; PARENTERAL at 08:03

## 2020-05-06 NOTE — ANESTHESIA POSTPROCEDURE EVALUATION
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        Post Anesthesia Care and Evaluation    Patient location during evaluation: PACU  Patient participation: complete - patient participated  Level of consciousness: awake and alert  Pain management: adequate  Airway patency: patent  Anesthetic complications: No anesthetic complications    Cardiovascular status: acceptable  Respiratory status: acceptable  Hydration status: acceptable    Comments: --------------------            05/06/20               1300     --------------------   BP:      161/100     Pulse:     109       Resp:       16       Temp:                SpO2:      97%      --------------------

## 2020-05-06 NOTE — ANESTHESIA PROCEDURE NOTES
Airway  Urgency: elective    Date/Time: 5/6/2020 8:05 AM  End Time:5/6/2020 8:05 AM  Airway not difficult    General Information and Staff    Patient location during procedure: OR  Anesthesiologist: Ron Perla MD  CRNA: Marietta Timmons CRNA    Indications and Patient Condition  Indications for airway management: airway protection    Preoxygenated: yes  Mask difficulty assessment: 0 - not attempted    Final Airway Details  Final airway type: endotracheal airway      Successful airway: ETT  Cuffed: yes   Successful intubation technique: direct laryngoscopy and RSI  Endotracheal tube insertion site: oral  Blade: Rasmussen  Blade size: 2  ETT size (mm): 7.0  Cormack-Lehane Classification: grade I - full view of glottis  Placement verified by: chest auscultation and capnometry   Cuff volume (mL): 7  Measured from: lips  ETT/EBT  to lips (cm): 21  Number of attempts at approach: 1  Assessment: lips, teeth, and gum same as pre-op and atraumatic intubation    Additional Comments  SIVI.  EYES TAPED CLOSED PRIOR TO DL.  INTUBATION AS CHARTED ABOVE.  APPEARS ATRAUMATIC.  NO CHANGE TO DENTITION. +ETCO2. +BBS. +CR.

## 2020-05-06 NOTE — OP NOTE
Operative note    Preoperative Diagnosis: Breast cancer, LEFT; RIGHT breast imaging abnormality     Postoperative Diagnosis: same    Procedure Performed: bilateral mastectomy and bilateral axillary sentinel node biopsy with lymphoscintographic guidance. Reconstruction immediately following, tissue expanders with alloderm.    Dictating physician and surgeon: Shreya Tim MD    Plastic Surgeon: Radha Guevara MD    First asst: Jina Hernández    EBL:30cc    FINDINGS AND DESCRIPTION OF PROCEDURE:     The patient was brought to the operating room and placed on the table in the supine position. After adequate general ETT  anesthesia was obtained, we sterilely prepped and draped the breasts and axillae.  We did a time out to identify correct patient and correct operative site.  She did receive IV antibiotic within an hour of and prior to incision.     The order of the case was LEFT TM, LEFT SLNB with frozen, RIGHT TM, RIGHT SLNB (no frozen)    For each mastectomy, I performed the following procedure:  I tumesced the mastectomy flaps- using 245 on the RIGHT and 250 on the LEFT-- mL of 1:500,000 epinephrine in normal saline.  I tumesced superiorly to the clavicle, inferiorly to the inframammary fold, medially to the sternum and laterally to the anterior axillary line.  I then incised a skin sparing ellipse of skin surrounding the nipple areolar complex using a 10 blade. I then sharply dissected using a 10 blade and a long curved Best scissors superiorly to the clavicle, inferiorly to the inframammary fold, medially to the sternum and laterally to the anterior axillary line. I then scored the perimeter of the specimen, the pectoral fascia, circumferentially. I then lifted the pectoral fascia off of the pectoralis major, as the posterior margin of the specimen.    I then labelled each specimen stitch marks 12:00 and passed it from the field.    For the sentinel node biopsy procedures,  I used the neoprobe at the start of  the case to ensure that the radiotracer had migrated to each axilla, which it had.  I used bovie electrocautery for dissection and the gamma probe for guidance, to remove the lymph nodes demonstrating radiopharmaceutical uptake.     There were 2 sentinel nodes removed, one from each side. LEFT counts 2060 , RIGHT counts 413. All were grossly normal. The LEFT was sent for frozen section pathology and were found to be benign. The RIGHT was sent for permanent.    I then irrigated, assured hemostasis and plastics then came in to do the reconstructive portion of the case.    She tolerated the procedure well, there were no immediate complications, and all counts were correct at the end of the case.

## 2020-05-06 NOTE — SIGNIFICANT NOTE
Spoke with daughter Sheri on phone and informed her pt did well in recovery.  She is having pain and pressure which she received pain medication for.  She is now going to her room P689

## 2020-05-06 NOTE — BRIEF OP NOTE
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      A Breast, Left Tissue · TISSUE PATHOLOGY EXAM   Shreya Tim MD 5/6/20 0922 No     Description: left breast total mastectomy- stitch marks 12 oclock    Comment: Mastectomy specimen for fresh for permanent (1133 grams)    B [place] Lymph Node Tissue · TISSUE PATHOLOGY EXAM   Shreya Tim MD 5/6/20 0923 Yes     Description: left sentinel node #1 (room #[id number])    C [place] Lymph Node Tissue · TISSUE PATHOLOGY EXAM   Shreya Tim MD 5/6/20 0956      Description: right sentinel node    D Breast, Right Tissue · TISSUE PATHOLOGY EXAM   Shreya Tim MD 5/6/20 1022 No     Description: right breast total mastectomy- stitch marks 12 0clock (1028 grams)    Comment: Mastectomy specimen fresh for permanent                  Drains:   Closed/Suction Drain 1 Left Breast Bulb 15 Fr. (Active)       Closed/Suction Drain 2 Left Breast Bulb 15 Fr. (Active)       Closed/Suction Drain 3 Right Breast Bulb 15 Fr. (Active)       Closed/Suction Drain 4 Right Breast Bulb 15  Fr. (Active)       Urethral Catheter Latex 16 Fr. (Active)       Findings:   R breast 1028g  L breast 1133g  Immediate recon with Tissue expander AlloX FH15SE 0 fill in subpec position  Alloderm CPRTU Large 164 cm^2 B    Complications: none      Radha Guevara MD     Date: 5/6/2020  Time: 12:36

## 2020-05-06 NOTE — ANESTHESIA PREPROCEDURE EVALUATION
Anesthesia Evaluation     Patient summary reviewed and Nursing notes reviewed                Airway   Mallampati: III  Dental      Pulmonary    (+) a smoker Former, asthma,  Cardiovascular - negative cardio ROS    Rhythm: regular  Rate: normal        Neuro/Psych  (+) psychiatric history Bipolar,     GI/Hepatic/Renal/Endo    (+)  GERD,  renal disease,     Musculoskeletal     (+) back pain,   Abdominal    Substance History - negative use     OB/GYN negative ob/gyn ROS         Other      history of cancer                    Anesthesia Plan    ASA 3     general     intravenous induction     Anesthetic plan, all risks, benefits, and alternatives have been provided, discussed and informed consent has been obtained with: patient.

## 2020-05-07 VITALS
HEART RATE: 100 BPM | OXYGEN SATURATION: 99 % | TEMPERATURE: 97.2 F | HEIGHT: 64 IN | SYSTOLIC BLOOD PRESSURE: 136 MMHG | DIASTOLIC BLOOD PRESSURE: 92 MMHG | RESPIRATION RATE: 16 BRPM | BODY MASS INDEX: 28.12 KG/M2 | WEIGHT: 164.68 LBS

## 2020-05-07 PROCEDURE — 99024 POSTOP FOLLOW-UP VISIT: CPT | Performed by: SURGERY

## 2020-05-07 PROCEDURE — 63710000001 DIPHENHYDRAMINE PER 50 MG: Performed by: SURGERY

## 2020-05-07 RX ADMIN — DIPHENHYDRAMINE HYDROCHLORIDE 50 MG: 25 CAPSULE ORAL at 06:55

## 2020-05-07 RX ADMIN — HYDROCODONE BITARTRATE AND ACETAMINOPHEN 2 TABLET: 5; 325 TABLET ORAL at 12:51

## 2020-05-07 RX ADMIN — HYDROCODONE BITARTRATE AND ACETAMINOPHEN 2 TABLET: 5; 325 TABLET ORAL at 05:32

## 2020-05-07 RX ADMIN — DESVENLAFAXINE SUCCINATE 25 MG: 25 TABLET, EXTENDED RELEASE ORAL at 09:37

## 2020-05-07 RX ADMIN — DIAZEPAM 5 MG: 5 TABLET ORAL at 06:55

## 2020-05-07 RX ADMIN — HYDROCODONE BITARTRATE AND ACETAMINOPHEN 2 TABLET: 5; 325 TABLET ORAL at 09:37

## 2020-05-07 NOTE — PROGRESS NOTES
POD #1 B mastectomies/breast reconstruction  Doing very well, up and dressed  Some irritation under L arm, minimal pain  Taking po and ambulating/voiding    97  98  16  121/78  460 from all drains overnight    A O x3 PERRL EOMI  Breathing nonlabored  Chest flat no hematoma  Skin looks viable  TIFFANY's with serosanguinous fluid    POD #1  should be able to go home today  Has all Rx's  Fu 1 week  Drain teaching and cont IS  No lifting over 5 lbs  No showering

## 2020-05-07 NOTE — DISCHARGE INSTRUCTIONS
If patient has had reconstruction, please get all postoperative and discharge activity instructions from plastic surgeon. If patient HAS NOT had reconstruction, then the following instructions will apply for postop and for discharge:  A responsible adult should accompany the patient on discharge and for 24 hours following surgery.  No heavy lifting >5# or strenuous upper arm activity, and no rasing arms over head until followup appointment.  No IV or blood pressure cuffs on arm on side of lymph node surgery, if patient has had lymph node surgery. No driving while taking pain or nausea medications, including muscle relaxants   WOUND CARE:  FOR PATIENTS WHO HAVE HAD BREAST SURGERY:  IF..... patient has had reconstruction or oncoplastic closure, wound care will be from the plastic surgeon.  IF PATIENT DOES NOT HAVE PLASTIC SURGEON, keep ace wrap (if present)  in place and dressings dry for 72 hours. May then remove ACE wrap (if present) and dressings and may shower. Leave steri strips on skin and ignore clear suture tails.  Blot dry incision with towel.  No tubs, hot tubs, pools. May wear post op bra or camisole given to you in my office or hospital, after removing ACE.   FOR PATIENTS WHO HAVE HAD PORT PLACED:   Keep dressing  in place and dressings dry for 72 hours.   May then remove dressings and may shower. Leave steri strips on skin and ignore clear suture tails.  Blot dry incision with towel.  No tubs, hot tubs, pools.  May use port immediately, but prefer to keep incision dry for 3 days.  Do not remove steri strips for 7-10 days.  FOR PATIENTS WHO HAVE DRAINS:  Drain and record output every 4 hours or as needed. Strip drain and clean, as per inpatient teaching.    ACTIVITY INSTRUCTIONS:  If patient has had reconstruction, please get all postoperative and discharge activity instructions from plastic surgeon. If patient HAS NOT had reconstruction, then the following instructions will apply for postop and for  discharge:  A responsible adult should accompany the patient on discharge and for 24 hours following surgery.  No heavy lifting >5# or strenuous upper arm activity, and no raising arms over head until followup appointment.  No IV or blood pressure cuffs on arm on side of lymph node surgery, if patient has had lymph node surgery. No driving while taking pain or nausea medications, including muscle relaxants.      OTHER:  Responsible adult to stay with patient at discharge and at least 24 hours after discharge.  Call the office for any of the following: persistent bleeding, excessive bruising or swelling, excessive sleepiness or trouble breathing, severe pain, persistent nausea or vomiting, fever greater than 101.0  Please note: if a plastic surgeon has been involved in the case, please call his or her office. If there is no plastic surgeon involved, please call Dr Tim's office.  Postop appointment was scheduled in Dr. Tim's office preoperatively. If patient cannot find that appointment, please call the office for a reminder on the next business day.

## 2020-05-07 NOTE — PLAN OF CARE
VSS, gauze, ace wrap dressing to chest, TIFFANY x4 draining bloody drainage, medicated for pain x 2 with good relief,, offered po med declined at present,  tolerating regular diet, f/c to bsd draining large amount cl yellow urine, instructed in IS good effort up to 2250, reviewed & demonstrated drain care to patient, she thinks her daughter will be helping her at home

## 2020-05-07 NOTE — CONSULTS
FIRST UROLOGY CONSULT      Patient Identification:  NAME:  Jina Huddleston  Age:  45 y.o.   Sex:  female   :  1975   MRN:  [id number]       Chief complaint: Gross hematuria    History of present illness: 45-year-old female admitted for other health issues had a Matson catheter present that was removed today with the balloon still inflated she developed gross hematuria pain with urination.  We are called this morning I came by to see her this afternoon.  She is actually voiding clear urine now and her dysuria has resolved.  No further vaginal bleeding.      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, estrogen receptor positive (CMS/HCC) 3/31/2020   • Drug therapy     For Hodgkin's Lymphoma   • GERD (gastroesophageal reflux disease)    • History of chemotherapy    • Hodgkin lymphoma (CMS/HCC) 1992   • Hx of radiation therapy 1992    Chest wall for Hodgkin's   • Kidney infection    • Low back pain    • Marijuana use    • Neuropathy     FINGERS       Past surgical history:  Past Surgical History:   Procedure Laterality Date   • BREAST BIOPSY     • BREAST BIOPSY     • BREAST RECONSTRUCTION Bilateral 2020    Procedure: BILATERAL BREAST RECONSTRUCTION WITH TISSUE EXPANDERSAND ALLODERM;  Surgeon: Radha Guevara MD;  Location: [facility];  Service: Plastics;  Laterality: Bilateral;   • CYST REMOVAL      HAND   • DIAGNOSTIC LAPAROSCOPY     • HYSTERECTOMY     • KNEE SURGERY Left    • MASTECTOMY W/ SENTINEL NODE BIOPSY Bilateral 2020    Procedure: bilateral total mastectomy, bilateral sentinel node biopsy;  Surgeon: Shreya Tim MD;  Location: [facility];  Service: General;  Laterality: Bilateral;   • MULTIPLE TOOTH EXTRACTIONS     • TUBAL ABDOMINAL LIGATION     • VENOUS ACCESS DEVICE (PORT) INSERTION AND REMOVAL          Allergies:  Patient has no known allergies.    Home medications:  No medications prior to admission.       Hospital medications:    Desvenlafaxine Succinate ER 25 mg Oral Daily   diazePAM 5 mg Oral Q8H       dextrose 5 % and sodium chloride 0.45 % with KCl 20 mEq/L 75 mL/hr Last Rate: 75 mL/hr (20 1547)     •  acetaminophen **OR** acetaminophen  •  albuterol  •  calcium polycarbophil  •  diphenhydrAMINE  •  HYDROcodone-acetaminophen  •  HYDROcodone-acetaminophen  •  HYDROmorphone **AND** naloxone  •  metoclopramide  •  ondansetron **OR** ondansetron  •  polyethylene glycol  •  promethazine **OR** promethazine    Family history:  Family History   Problem Relation Age of Onset   • Cancer Maternal Uncle         UNKNOWN ORGIN    • Prostate cancer Maternal Uncle         UNKNOWN AGE    • Malig Hyperthermia Neg Hx        Social history:  Social History     Tobacco Use   • Smoking status: Former Smoker     Packs/day: 0.50     Types: Cigarettes     Start date:      Last attempt to quit: 2020     Years since quittin.3   • Smokeless tobacco: Never Used   Substance Use Topics   • Alcohol use: Yes     Comment: OCC   • Drug use: Yes     Frequency: 1.0 times per week     Types: Marijuana     Comment: OCC-9 days ago       Review of systems:    Negative 12-system ROS except for the following: No baseline voiding issues.  No incontinence.      Objective:  TMax 24 hours:   Temp (24hrs), Av.4 °F (36.3 °C), Min:97 °F (36.1 °C), Max:98.3 °F (36.8 °C)      Vitals Ranges:   Temp:  [97 °F (36.1 °C)-98.3 °F (36.8 °C)] 97.2 °F (36.2 °C)  Heart Rate:  [] 100  Resp:  [16] 16  BP: (118-148)/(77-92) 136/92    Intake/Output Last 3 shifts:  I/O last 3 completed shifts:  In:  [I.V.:1900; IV Piggyback:100]  Out:  [Urine:2250; Drains:460; Blood:25]     Physical Exam:       General Appearance:    Alert, cooperative, in no acute distress   Head:    Normocephalic, without obvious abnormality, atraumatic   Eyes:           PERRL, conjunctivae and corneas clear   Ears:    Normal external inspection   Throat:   No oral lesions, oral mucosa moist   Neck:   Supple, no LAD, trachea midline   Back:     No CVA tenderness   Lungs:     Respirations unlabored, symmetric excursion    Heart:    RRR, intact peripheral pulses   Abdomen:    Benign   :   Deferred   Extremities:   No edema, no deformity   Skin:   No bleeding, bruising or rashes   Neuro/Psych:   Orientation intact, mood/affect pleasant, no focal findings       Results review:   I reviewed the patient's new clinical results.    Data review:  Lab Results (last 24 hours)     ** No results found for the last 24 hours. **           Imaging:  Imaging Results (Last 24 Hours)     ** No results found for the last 24 hours. 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  We will see her as needed in the office if issues arise but she can be discharged from our standpoint.    Ron Bradley MD  05/07/20  17:26

## 2020-05-07 NOTE — PLAN OF CARE
Problem: Patient Care Overview  Goal: Plan of Care Review  Outcome: Ongoing (interventions implemented as appropriate)   Ivf, iv abt, steri strips over rl chest wounds with abd and ace wrap, carson x4 with dark re-drain care taught to pt, f/c d/c vdg sm amt- waiting to void again, med for pain with po pain med and scheduled valium.  Pt states when becca elizabeth removed leyva cath she did not deflate balloon and pulled cath out.  Pt noted to have some bleeding from uretheral area and when voided urine was sl cloudy radha/tea colored.  Pt c/o burning on urination.  Leyva cath found in garbage can in bathroom with balloon inflated.  When looking for syringe pt states she did not use syringe.  Spoke with coni about this

## 2020-05-07 NOTE — PROGRESS NOTES
Continued Stay Note  [facility]     Patient Name: Jina Huddleston  MRN: [id number]  Today's Date: 5/7/2020    Admit Date: 5/6/2020    Discharge Plan     Row Name 05/07/20 0946       Plan    Plan  Home    Plan Comments  Discharged order noted. Met with patient who stated DC plan is home. Daughter will provide trasnportation. Denies any need/equipment.         Discharge Codes    No documentation.       Expected Discharge Date and Time     Expected Discharge Date Expected Discharge Time    May 7, 2020             Bijal Rivera RN

## 2020-05-07 NOTE — PLAN OF CARE
VSS, gauze & ace wrap dressing to chest dry & Iintact, carson x 4 draining bloody drainage, pt instructed in drain care, return demonstrated, f/c d/c'd this am, bloody urine & burning with urination, cleared up by afternoon,  urine clear, no longer burning with urination, urology Dr Bradley saw patient, no new orders, pt d/c'd home with drain supplies

## 2020-05-07 NOTE — PROGRESS NOTES
INPATIENT ROUNDING NOTE    Postoperative day: 1    Overnight events:     Patient has done well overnight.    no nausea  She reports that her pain is responding favorably to the prescribed meds.  She has voided since her catheter has been removed. Nursing notified me this morning on rounds that patient had catheter trauma related to removal of leyva with balloon partially up. This occurred at 1 am and patient then voided 400 ml at 6 am.  Patient reports no current bleeding or pain at the location of her urethral orifice.  She has ambulated.    Exam:  Temp:  [97 °F (36.1 °C)-98.6 °F (37 °C)] 97 °F (36.1 °C)  Heart Rate:  [] 98  Resp:  [16-18] 16  BP: (118-170)/() 121/78      cc after removal.    On examination, her incisions are dressed and dressings clean dry and intact.  Her mastectomy flaps are well perfused with minimal ecchymoses. Expanders unexpanded.  There are no undrained fluid collections.      Assessment and plan:    Breast cancer, postoperative day 1.  Doing well.  HLIV.  Will consult urology to see if she needs any treatment to prevent urinary retention/bladder spasm, urethral swelling post traumatic leyva removal.  Likely home today after drain teaching, urology consultation and breakfast and/or lunch.    She has an appointment with me in the office already scheduled for her postop visit.

## 2020-05-08 ENCOUNTER — TELEPHONE (OUTPATIENT)
Dept: SURGERY | Facility: CLINIC | Age: 45
End: 2020-05-08

## 2020-05-08 NOTE — PROGRESS NOTES
Case Management Discharge Note      Final Note: Discharged home. Bijal Rivera, JEFFREY              Transportation Services  Private: Car    Final Discharge Disposition Code: 01 - home or self-care

## 2020-05-08 NOTE — TELEPHONE ENCOUNTER
Pathology from 5-6-20 bilateral total mastectomy, left axillary sentinel lymph node biopsy, immediate reconstruction with Dr. Guevara.  Pathology returned as left mastectomy, invasive mammary carcinoma, no special type, 1.5 cm, intermediate grade, 3, 2, 1, no lymphovascular invasion, associated duct carcinoma site 2, 2 cm, intermediate grade.  Margins are clear for both the invasive and the duct carcinoma in situ by 2.6 cm.  Focal atypical lobular hyperplasia is noted not present at a margin.  0 of 2 sentinel lymph nodes.  Right mastectomy non-neoplastic breast tissue with fibrocystic change, adenosis, sclerosing adenosis, columnar cell change, duct hyperplasia of the usual type and fat necrosis.  2 previous biopsy site changes identified.  One sentinel node on the right negative.  Pathologic stage T1cN0 stage Ia  I will call and let her know.  We will send an Oncotype.  She will not need to see radiation oncology.    Oncotype score 7-dated May 14, 2020.  With the risk of recurrence at 9 years on a hormone blocker alone of only 3%.    Final Diagnosis   1. Left [place] Lymph Node #1:               A. Two lymph nodes negative for metastatic carcinoma by routine staining (0/2).     2. Left Breast, Simple Skin Sparing Mastectomy:                 A. Invasive breast carcinoma of no special type (invasive ductal carcinoma)                            1. Invasive carcinoma measures 15 mm x 12 mm x 8 mm.                            2. Overall [place] grade II (tubular score = 3, nuclear score = 2,                                mitotic score = 1).                            3. No lymphovascular invasion identified.                            4. Microcalcifications are present in the invasive component.               B. Associated ductal carcinoma in situ (DCIS)                            1. Scattered foci of DCIS span an area estimated at 20 mm x 15 mm x 10 mm.                            2. Intermediate grade solid and  cribriform DCIS with focal comedo-like necrosis.                            3. Microcalcification present in DCIS.               C. All margins are negative for invasive carcinoma.                    Invasive carcinoma measures at least 26 mm from all margins of excision.                                   D. All margins are negative for ductal carcinoma in situ (DCIS).                    DCIS measures at least 26 mm from all margins of excision.                                   E. Focal biopsy site change and metallic clip are identified.               F. No Pagetoid involvement of skin nor nipple identified.               G. Focal atypical lobular hyperplasia (ALH) noted, not present at the margin.               H. Non-neoplastic breast tissue with fibrocystic change, adenosis and sclerosing adenosis noted.                    Microcalcification present in benign breast tissue.               I. Previous Biomarkers: Estrogen receptors: Positive (100%, 8/8), Progesterone receptors: Positive (100%, 8/8),                    HER/2-selene: Negative (score 1+), Ki-67 = 3-5% (per FY23-4420/BV20-28).     3. Right Breast, Simple Skin Sparing Mastectomy:               A. No in-situ nor infiltrating carcinoma identified.               B. Non-neoplastic breast tissue with fibrocystic change, adenosis, sclerosing adenosis, columnar cell change,                    duct hyperplasia of usual type and focal organizing fat necrosis.               C. No Pagetoid involvement of skin and/or nipple by malignancy identified.               D. No lymphovascular space invasion nor perineural invasion by tumor identified.               E. Multiple previous biopsy site changes (x2) identified.     4. Right [place] Lymph Node:               A. One lymph node negative for metastatic carcinoma by routine staining (0/1).      See synoptic for tumor details.  Jat/kds   Electronically signed by Bubba Barnett MD on 5/8/2020 at 1004   Synoptic  "Checklist   INVASIVE CARCINOMA OF THE BREAST: Resection   Breast.Invasive - 1, 2   8th Edition - Protocol posted: 9/10/2019   CLINICAL   Radiologic Finding  Mass or architectural distortion      Calcifications    SPECIMEN   Procedure  Total mastectomy    Specimen Laterality  Left    TUMOR   Tumor Site  Upper inner quadrant    Clock Position of Tumor Site  11 o'clock    Histologic Type  Invasive carcinoma of no special type (invasive ductal carcinoma, not otherwise specified)    Glandular (Acinar) / Tubular Differentiation  Score 3    Nuclear Pleomorphism  Score 2    Mitotic Rate  Score 1    Overall Grade  Grade 2 (scores of 6 or 7)    Tumor Size  Greatest dimension of largest invasive focus (Millimeters): 15 mm   Additional Dimension (Millimeters)  12 mm     8 mm   Tumor Focality  Single focus of invasive carcinoma    Ductal Carcinoma In Situ (DCIS)  Present      Negative for extensive intraductal component (EIC)    Size (Extent) of DCIS  Estimated size (extent) of DCIS is at least (Millimeters): 20 mm   Additional Dimension (Millimeters)  15 mm     10 mm   Architectural Patterns  Cribriform      Solid    Nuclear Grade  Grade II (intermediate)    Necrosis  Present, central (expansive \"comedo\" necrosis)    Lobular Carcinoma In Situ (LCIS)  No LCIS in specimen    Tumor Extent     Lymphovascular Invasion  Not identified    Dermal Lymphovascular Invasion  Not identified    Microcalcifications  Present in DCIS      Present in invasive carcinoma      Present in non-neoplastic tissue    Treatment Effect  No known presurgical therapy    MARGINS   Invasive Carcinoma Margins  Uninvolved by invasive carcinoma    Distance from Closest Margin (Millimeters)  26 mm   Closest Margin(s)  Anterior    Distance from Other Margins     Anterior Margin (Millimeters)  26 mm   Posterior Margin (Millimeters)  30 mm   Superior Margin (Millimeters)  40 mm   Inferior Margin (Millimeters)  185 mm   Medial Margin (Millimeters)  60 mm   Lateral " Margin (Millimeters)  140 mm   DCIS Margins  Uninvolved by DCIS    Distance from Closest Margin (Millimeters)  26 mm   Closest Margin(s)  Anterior    Distance from Other Margins     Anterior Margin (Millimeters)  26 mm   Posterior Margin (Millimeters)  30 mm   Superior Margin (Millimeters)  35 mm   Inferior Margin (Millimeters)  180 mm   Medial Margin (Millimeters)  60 mm   Lateral Margin (Millimeters)  140 mm   LYMPH NODES   Regional Lymph Nodes  Uninvolved by tumor cells    Total Number of Lymph Nodes Examined  2    Number of [place] Nodes Examined  2    PATHOLOGIC STAGE CLASSIFICATION (pTNM, AJCC 8th Edition)   Primary Tumor (pT)  pT1c    Regional Lymph Nodes Modifier  (sn): Only sentinel node(s) evaluated.    Regional Lymph Nodes (pN)  pN0    SPECIAL STUDIES   Breast Biomarker Testing Performed on Previous Biopsy     Estrogen Receptor (ER)  Positive (percentage): 100 %   Breast Biomarker Testing Performed on Previous Biopsy     Progesterone Receptor (PgR)  Positive (percentage): 100 %   Breast Biomarker Testing Performed on Previous Biopsy     HER2 (by immunohistochemistry)  Negative (Score 1+)    Breast Biomarker Testing Performed on Previous Biopsy  Ki-67    Percentage of Positive Nuclei  4 %   Testing Performed on Case Number  [id number]    .      Comment    Immunostain for P63 was performed on multiple representative sections of tumor utilizing appropriate controls. Foci of DCIS admixed with invasive tumor are confirmed by P63 staining. Representative sections were shared in consultation with Susanne Denton and , who concurred with the diagnosis.

## 2020-05-19 ENCOUNTER — OFFICE VISIT (OUTPATIENT)
Dept: SURGERY | Facility: CLINIC | Age: 45
End: 2020-05-19

## 2020-05-19 ENCOUNTER — TELEPHONE (OUTPATIENT)
Dept: SURGERY | Facility: CLINIC | Age: 45
End: 2020-05-19

## 2020-05-19 VITALS
OXYGEN SATURATION: 98 % | HEIGHT: 64 IN | WEIGHT: 163.8 LBS | DIASTOLIC BLOOD PRESSURE: 84 MMHG | TEMPERATURE: 98.7 F | BODY MASS INDEX: 27.96 KG/M2 | HEART RATE: 92 BPM | SYSTOLIC BLOOD PRESSURE: 127 MMHG

## 2020-05-19 DIAGNOSIS — Z85.71 HISTORY OF HODGKIN'S LYMPHOMA: Primary | ICD-10-CM

## 2020-05-19 DIAGNOSIS — Z72.0 TOBACCO USE: ICD-10-CM

## 2020-05-19 DIAGNOSIS — F12.90 MARIJUANA USE: ICD-10-CM

## 2020-05-19 DIAGNOSIS — C50.212 MALIGNANT NEOPLASM OF UPPER-INNER QUADRANT OF LEFT FEMALE BREAST, UNSPECIFIED ESTROGEN RECEPTOR STATUS (HCC): ICD-10-CM

## 2020-05-19 PROCEDURE — 99024 POSTOP FOLLOW-UP VISIT: CPT | Performed by: SURGERY

## 2020-05-19 RX ORDER — LIDOCAINE AND PRILOCAINE 25; 25 MG/G; MG/G
CREAM TOPICAL
COMMUNITY
Start: 2020-05-04 | End: 2020-11-05

## 2020-05-19 RX ORDER — PROMETHAZINE HYDROCHLORIDE 25 MG/1
25 TABLET ORAL EVERY 8 HOURS PRN
COMMUNITY
Start: 2020-04-30 | End: 2021-07-27

## 2020-05-19 RX ORDER — CEPHALEXIN 500 MG/1
CAPSULE ORAL
COMMUNITY
Start: 2020-04-30 | End: 2021-01-18

## 2020-05-19 RX ORDER — HYDROCODONE BITARTRATE AND ACETAMINOPHEN 5; 325 MG/1; MG/1
1 TABLET ORAL EVERY 6 HOURS PRN
COMMUNITY
Start: 2020-05-14 | End: 2021-07-27

## 2020-05-19 NOTE — PROGRESS NOTES
Chief Complaint: Jina Huddleston is a 45 y.o. female who was seen in consultation at the request of Luis Angel Callahan MD  for newly diagnosed breast cancer and a postoperative visit    History of Present Illness:  Patient presents with newly diagnosed breast cancer. She noted no new masses, skin changes, nipple discharge, nipple changes prior to her most recent imaging.  Her most recent imaging includes the followin2020   [facility]   Mammo  Jina Huddleston  BILATERAL SCREENING  CLINICAL INDICATION: This is the patient’s baseline screening mammogram.  COMPARISON: None.  Heterogeneously dense.  Superior/medial posterior third left breast, is a 10 mm mass which may have a central punctate focus of calcifications.  BIRADS: 0    2020   [facility]   Radiology  Jina Huddleston  ULTRASOUND LEFT BREAST  FINDINGS: 10:00 left breast microlobulated, solid mass measuring 12 mm. 12:00 left breast, 8 x 6 x 4 mm lesion a thin hyperechoic rim, probably benign, six-month follow up recommended.  IMPRESSION: 12 mm suspicious mass, 10:00 left breast explaining mammographic nodularity.  BIRADS: 4           She had a biopsy on the following day that showed:   2020   [facility]   Radiology  Jina Huddleston  ULTRASOUND GUIDED BREAST BX  Left breast.  10:00 position. 3 passes. Marker clip was placed.    2020   [facility]   Mammo  Jinasoledad SantizoHuddleston  DIAG MAMMO  CLINICAL INDICATION: Post-biopsy mammogram.  The breast parenchyma is heterogeneously. Biopsy clip is located within the mass in the 10:00 position of the deep left breast.  BIRADS: 0      2020   [facility]  Pathology  Jina Huddleston  1. Breast, Left, 10:00, Biopsy: Invasive ductal adenocarcinoma and ductal carcinoma in situ with  A. Invasive ductal adenocarcinoma.  1. Scott Grade II (tubular grade 3, nuclear grade 2, mitotic grade 1).  2. 8 mm  3. No lymphovascular or perineural invasion  4. ER - 100%  5. VT - 100%  6. HER2 - Negative (1+)  B. Ductal  carcinoma in situ.  1. Intermediate nuclear grade with solid and cribriform architecture and no necrosis.  Comment: Ki-67 will be performed.    03/17/2020   [facility]   Pathology  Jina Huddleston  Left Breast Mass at 10:00:   - Invasive ductal carcinoma.  - Grade 1 ([place] Score 2+2+1=5/9_  - No DCIS identified.  - Largest 2 cm.  ER - 100%, 3+  NE - 100%, 3+  HER2/selene Oncoprotein - 0+    I then arranged for a MRI and Dx mammo to follow:  03/27/2020   [facility]  Radiology  Jina Huddleston  MRI BREAST BILATERAL  Posterior one third upper-inner the left breast 11:00 11.5 cm from the nipple there is a metallic clip. No residual or suspicious enhancement is seen at this location.  No areas of abnormal enhancement or morphology are seen within the left breast. No evidence for left axillary or internal mammary chain adenopathy.  Posterior one third upper outer right breast 10:00 10 cm from the nipple there are 2 focal areas of enhancement nonspecific. One of the areas measures 0.8 cm 1.8 cm superior to t a focus of enhancement that measures 0.6 cm in greatest dimension. On the mammogram there is visualization of multiple microcalcifications in the posterior one third upper quadrant of the right breast in conjunction with a small areas of focal asymmetry in the axillary tail right breast.  Additionally, there is a 1.4 cm in length linear enhancement middle third right breast 4:00 in the region where there are linearly arranged microcalcifications middle third lower quadrant the right breast.  No other areas of abnormal enhancement or morphology are seen in the right breast. No evidence for right axillary or internal mammary chain adenopathy.  IMPRESSION:  1. Left breast appears to be a candidate for breast conservation therapy.  2. Right breast 4:00. Stereotactic guided biopsy of the calcifications is recommended.  3. There are 2 nonspecific focal areas of enhancement posterior one third upper outer quadrant of the  right breast. Further evaluation with spot compression and spot magnification upper outer the right breast is recommended.  BIRADS: 4    03/30/2020 [facility] MAMMO DIGITAL DIAG RASHI RIGHT  SHELL LEW   Right digital diagnostic mammography with tomosynthesis scattered fibroglandular densities. Middle third lower inner quadrant of the right breast microcalcifications that correspond to linear enhancement seen in the right breast at the 4:00 position. Additionally, there are multiple punctate monomorphic regional microcalcifications in the posterior one third upper outer quadrant right breast. A nonspecific focal area of enhancement is noted to be in that region on a breast MRI examination. There is also a persistent areas of focal asymmetry with ill defined margins with associated microcalcifications in the axillary tail of the right breast. The density  Measures  03.5 cm. This corresponds to a 0.8 cm nodular area of enhancement on the patient's breast MRI examination. IMPRESSION: 0.5 cm ill defined density with associate calcifications in the axillary tail. Corresponds to 0.8 cm nodular area of enhnacement in the posterior one third axillary tail right breast MRI. Correlation with a stereotactic  Guided right breast biopsy is recommended. There are microcalcifications seen in the middle third lower inner quadrant of the right breast at the 4:00 position. Correlation with stereotactic guided right breast biopsy is recommended. There are multiple regional microcalcifications seen in the posterior one third of the upper outer quadrant the right breast. Six month mammographic follow up is recommended.      She has not had a breast biopsy in the past.  She has her  ovaries, is postmenopausal, and takes nor hormones.Uterus removed 2004 for bleeding  Her family history includes the following: She has 1 daughter, 2 sisters, 3 maternal aunts, no paternal aunts.  No family history of breast or ovarian cancer.    Interval  HIstory:  Pathology from 5-6-20 bilateral total mastectomy, left axillary sentinel lymph node biopsy, immediate reconstruction with Dr. Guevara.  Pathology returned as left mastectomy, invasive mammary carcinoma, no special type, 1.5 cm, intermediate grade, 3, 2, 1, no lymphovascular invasion, associated duct carcinoma site 2, 2 cm, intermediate grade.  Margins are clear for both the invasive and the duct carcinoma in situ by 2.6 cm.  Focal atypical lobular hyperplasia is noted not present at a margin.  0 of 2 sentinel lymph nodes.  Right mastectomy non-neoplastic breast tissue with fibrocystic change, adenosis, sclerosing adenosis, columnar cell change, duct hyperplasia of the usual type and fat necrosis.  2 previous biopsy site changes identified.  One sentinel node on the right negative.  Pathologic stage T1cN0 stage Ia       Oncotype score 7-dated May 14, 2020.  With the risk of recurrence at 9 years on a hormone blocker alone of only 3%.     She denies any redness warmth or drainage from any incision.  She still has all 4 drains.  She tells me that she will be seeing Dr. Guevara this Thursday for potentially to drain removed and a fill.  She is here for review    Review of Systems:  Review of Systems   Constitutional: Positive for appetite change, diaphoresis and unexpected weight change (1.8 lb wt gain ). Negative for chills, fatigue and fever.   HENT:   Positive for trouble swallowing. Negative for hearing loss, lump/mass, mouth sores, nosebleeds, sore throat, tinnitus and voice change.    Eyes: Positive for eye problems (DOUBLE VISION). Negative for icterus.   Respiratory: Positive for cough and wheezing. Negative for chest tightness, hemoptysis and shortness of breath.    Cardiovascular: Negative for chest pain, leg swelling and palpitations.   Gastrointestinal: Positive for nausea. Negative for abdominal distention, abdominal pain, blood in stool, constipation, diarrhea, rectal pain and vomiting.    Endocrine: Negative for hot flashes.   Genitourinary: Negative for bladder incontinence, difficulty urinating, dyspareunia, dysuria, frequency, hematuria, menstrual problem, nocturia, pelvic pain, vaginal bleeding and vaginal discharge.    Musculoskeletal: Positive for back pain. Negative for arthralgias, flank pain, gait problem, myalgias, neck pain and neck stiffness.   Skin: Negative for itching, rash and wound.   Neurological: Negative for dizziness, extremity weakness, gait problem, headaches, light-headedness, numbness, seizures and speech difficulty.   Hematological: Negative for adenopathy. Bruises/bleeds easily.   Psychiatric/Behavioral: Negative for confusion, decreased concentration, depression, sleep disturbance and suicidal ideas. The patient is not nervous/anxious.         Past Medical and Surgical History:  Breast Biopsy History:  Patient had not had a breast biopsy prior to her cancer diagnosis.  Breast Cancer HIstory:  Patient does not have a past medical history of breast cancer.  Breast Operations, and year:  NONE   Obstetric/Gynecologic History:  Age menstrual periods began: 9  Patient is postmenopausal due to removal of her uterus in the following year:2004   Number of pregnancies:3  Number of live births: 2  Number of abortions or miscarriages: 1  Age of delivery of first child: 19  Patient did not breast feed.  Length of time taking birth control pills: 7 YRS   Patient has never taken hormone replacement  PATIENT HAS BOTH OVARIES. UTERUS REMOVED 2004.     Past Surgical History:   Procedure Laterality Date   • BREAST BIOPSY     • BREAST BIOPSY     • BREAST RECONSTRUCTION Bilateral 5/6/2020    Procedure: BILATERAL BREAST RECONSTRUCTION WITH TISSUE EXPANDERSAND ALLODERM;  Surgeon: Radha Guevara MD;  Location: [facility];  Service: Plastics;  Laterality: Bilateral;   • CYST REMOVAL      HAND   • DIAGNOSTIC LAPAROSCOPY     • HYSTERECTOMY     • KNEE SURGERY Left    • MASTECTOMY W/  SENTINEL NODE BIOPSY Bilateral 2020    Procedure: bilateral total mastectomy, bilateral sentinel node biopsy;  Surgeon: Shreya Tim MD;  Location: [facility];  Service: General;  Laterality: Bilateral;   • MULTIPLE TOOTH EXTRACTIONS     • TUBAL ABDOMINAL LIGATION     • VENOUS ACCESS DEVICE (PORT) INSERTION AND REMOVAL       Past Medical History:   Diagnosis Date   • Asthma, exercise induced    • Back pain    • Bipolar I disorder (CMS/HCC)    • Carcinoma of upper-inner quadrant of left breast in female, estrogen receptor positive (CMS/HCC) 3/31/2020   • Drug therapy     For Hodgkin's Lymphoma   • GERD (gastroesophageal reflux disease)    • History of chemotherapy    • Hodgkin lymphoma (CMS/HCC) 1992   • Hx of radiation therapy     Chest wall for Hodgkin's   • Kidney infection    • Low back pain    • Marijuana use    • Neuropathy     FINGERS       Prior Hospitalizations, other than for surgery or childbirth, and year:  MONO AND UTI    Social History     Socioeconomic History   • Marital status:      Spouse name: Not on file   • Number of children: Not on file   • Years of education: Not on file   • Highest education level: Not on file   Tobacco Use   • Smoking status: Former Smoker     Packs/day: 0.50     Types: Cigarettes     Start date:      Last attempt to quit:      Years since quittin.3   • Smokeless tobacco: Never Used   Substance and Sexual Activity   • Alcohol use: Yes     Comment: OCC   • Drug use: Yes     Frequency: 1.0 times per week     Types: Marijuana     Comment: OCC-9 days ago   • Sexual activity: Defer     Patient is .  IN HOME HEALTH CARE   Patient drinks 2 servings of caffeine per day.    Family History:  Family History   Problem Relation Age of Onset   • Cancer Maternal Uncle         UNKNOWN ORGIN    • Prostate cancer Maternal Uncle         UNKNOWN AGE    • Malig Hyperthermia Neg Hx        Vital Signs:  /84   Pulse 92   Temp 98.7 °F  "(37.1 °C) (Oral)   Ht 162.6 cm (64\")   Wt 74.3 kg (163 lb 12.8 oz)   LMP  (LMP Unknown)   SpO2 98%   Breastfeeding No   BMI 28.12 kg/m²      Medications:    Current Outpatient Medications:   •  albuterol (PROAIR RESPICLICK) 108 (90 Base) MCG/ACT inhaler, Inhale Every 4 (Four) Hours As Needed for Wheezing., Disp: , Rfl:   •  cephalexin (KEFLEX) 500 MG capsule, TK 1 C PO QID, Disp: , Rfl:   •  Desvenlafaxine Succinate ER (Pristiq) 25 MG tablet sustained-release 24 hour, Take 25 mg by mouth Daily., Disp: , Rfl:   •  HYDROcodone-acetaminophen (NORCO) 5-325 MG per tablet, TK 1 T PO Q 6 H PRN, Disp: , Rfl:   •  promethazine (PHENERGAN) 25 MG tablet, TK 1 T PO Q 8 HOURS PRN NAUSEA, Disp: , Rfl:   •  anastrozole (ARIMIDEX) 1 MG tablet, Take 1 mg by mouth Daily., Disp: , Rfl:   •  lidocaine-prilocaine (EMLA) 2.5-2.5 % cream, APPLY ONCE BEFORE SURGERY AND COVER, Disp: , Rfl:      Allergies:  No Known Allergies    Physical Examination:  /84   Pulse 92   Temp 98.7 °F (37.1 °C) (Oral)   Ht 162.6 cm (64\")   Wt 74.3 kg (163 lb 12.8 oz)   LMP  (LMP Unknown)   SpO2 98%   Breastfeeding No   BMI 28.12 kg/m²   General Appearance:  Patient is in no distress.  She is well kept and has an average build.   Psychiatric:  Patient with appropriate mood and affect. Alert and oriented to self, time, and place.    Breast, RIGHT:  Surgically absent with well-healing transverse incisions with an expander unexpanded.  Drains are serous.  No undrained collections.  No erythema warmth or drainage.    Breast, LEFT:   Surgically absent with well-healing transverse incisions with an expander unexpanded.  Drains are serous.  No undrained collections.  No erythema warmth or drainage.    Lymphatic:  There is no axillary, cervical, infraclavicular, or supraclavicular adenopathy bilaterally.  Eyes:  Pupils are round and reactive to light.  Cardiovascular:  Heart rate and rhythm are regular.  Respiratory:  Lungs are clear bilaterally with " no crackles or wheezes in any lung field.  Gastrointestinal:  Abdomen is soft, nondistended, and nontender.     Musculoskeletal:  Good strength in all 4 extremities.   There is good range of motion in both shoulders.    Skin:  No new skin lesions or rashes on the skin excluding the breast (see breast exam above).        Imagin2020   [facility]   Mammo  Jina Huddleston  BILATERAL SCREENING  CLINICAL INDICATION: This is the patient’s baseline screening mammogram.  COMPARISON: None.  Heterogeneously dense.  Superior/medial posterior third left breast, is a 10 mm mass which may have a central punctate focus of calcifications.  BIRADS: 0    2020   [facility]   Radiology  Jina Huddleston  ULTRASOUND LEFT BREAST  FINDINGS: 10:00 left breast microlobulated, solid mass measuring 12 mm. 12:00 left breast, 8 x 6 x 4 mm lesion a thin hyperechoic rim, probably benign, six-month follow up recommended.  IMPRESSION: 12 mm suspicious mass, 10:00 left breast explaining mammographic nodularity.  BIRADS: 4    2020   [facility]  Radiology  Jina Huddleston  MRI BREAST BILATERAL  Posterior one third upper-inner the left breast 11:00 11.5 cm from the nipple there is a metallic clip. No residual or suspicious enhancement is seen at this location.  No areas of abnormal enhancement or morphology are seen within the left breast. No evidence for left axillary or internal mammary chain adenopathy.  Posterior one third upper outer right breast 10:00 10 cm from the nipple there are 2 focal areas of enhancement nonspecific. One of the areas measures 0.8 cm 1.8 cm superior to t a focus of enhancement that measures 0.6 cm in greatest dimension. On the mammogram there is visualization of multiple microcalcifications in the posterior one third upper quadrant of the right breast in conjunction with a small areas of focal asymmetry in the axillary tail right breast.  Additionally, there is a 1.4 cm in length linear enhancement middle third  right breast 4:00 in the region where there are linearly arranged microcalcifications middle third lower quadrant the right breast.  No other areas of abnormal enhancement or morphology are seen in the right breast. No evidence for right axillary or internal mammary chain adenopathy.  IMPRESSION:  4. Left breast appears to be a candidate for breast conservation therapy.  5. Right breast 4:00. Stereotactic guided biopsy of the calcifications is recommended.  6. There are 2 nonspecific focal areas of enhancement posterior one third upper outer quadrant of the right breast. Further evaluation with spot compression and spot magnification upper outer the right breast is recommended.  BIRADS: 4    03/30/2020 [facility] MAMMO DIGITAL DIAG RASHI RIGHT  SHELL LEW   Right digital diagnostic mammography with tomosynthesis scattered fibroglandular densities. Middle third lower inner quadrant of the right breast microcalcifications that correspond to linear enhancement seen in the right breast at the 4:00 position. Additionally, there are multiple punctate monomorphic regional microcalcifications in the posterior one third upper outer quadrant right breast. A nonspecific focal area of enhancement is noted to be in that region on a breast MRI examination. There is also a persistent areas of focal asymmetry with ill defined margins with associated microcalcifications in the axillary tail of the right breast. The density  Measures  03.5 cm. This corresponds to a 0.8 cm nodular area of enhancement on the patient's breast MRI examination. IMPRESSION: 0.5 cm ill defined density with associate calcifications in the axillary tail. Corresponds to 0.8 cm nodular area of enhnacement in the posterior one third axillary tail right breast MRI. Correlation with a stereotactic  Guided right breast biopsy is recommended. There are microcalcifications seen in the middle third lower inner quadrant of the right breast at the 4:00 position. Correlation  with stereotactic guided right breast biopsy is recommended. There are multiple regional microcalcifications seen in the posterior one third of the upper outer quadrant the right breast. Six month mammographic follow up is recommended.     PET/[facility] from [facility] dated April 6, 2020 interstitial changes medial upper lobes bilaterally likely related to posttreatment change 3 months CT of the chest is recommended otherwise no evidence of metastatic disease in the neck chest abdomen or pelvis.  April 6, 2020 [facility] bilateral carotid duplex: No evidence of any hemodynamically significant stenosis or occlusion.      Pathology:  03/17/2020   [facility]   Radiology  Jina Huddleston  ULTRASOUND GUIDED BREAST BX  Left breast.  10:00 position. 3 passes. Marker clip was placed.    03/17/2020   [facility]   Mammo  Jina Huddleston  DIAG MAMMO  CLINICAL INDICATION: Post-biopsy mammogram.  The breast parenchyma is heterogeneously. Biopsy clip is located within the mass in the 10:00 position of the deep left breast.  BIRADS: 0      03/27/2020   [facility]  Pathology  Jina Huddleston  2. Breast, Left, 10:00, Biopsy: Invasive ductal adenocarcinoma and ductal carcinoma in situ with  C. Invasive ductal adenocarcinoma.  7. [place] Grade II (tubular grade 3, nuclear grade 2, mitotic grade 1).  8. 8 mm  9. No lymphovascular or perineural invasion  10. ER - 100%  11. NJ - 100%  12. HER2 - Negative (1+)  D. Ductal carcinoma in situ.  2. Intermediate nuclear grade with solid and cribriform architecture and no necrosis.  Comment: Ki-67 will be performed.    03/17/2020   [facility]   [facility]ology  Jina Huddleston  Left Breast Mass at 10:00:   - Invasive ductal carcinoma.  - Grade 1 ([place] Score 2+2+1=5/9_  - No DCIS identified.  - Largest 2 cm.  ER - 100%, 3+  NJ - 100%, 3+  HER2/selene Oncoprotein - 0+    Pathology from 5-6-20 bilateral total mastectomy, left axillary sentinel lymph node biopsy, immediate reconstruction with Dr. Guevara.  Pathology returned  as left mastectomy, invasive mammary carcinoma, no special type, 1.5 cm, intermediate grade, 3, 2, 1, no lymphovascular invasion, associated duct carcinoma site 2, 2 cm, intermediate grade.  Margins are clear for both the invasive and the duct carcinoma in situ by 2.6 cm.  Focal atypical lobular hyperplasia is noted not present at a margin.  0 of 2 sentinel lymph nodes.  Right mastectomy non-neoplastic breast tissue with fibrocystic change, adenosis, sclerosing adenosis, columnar cell change, duct hyperplasia of the usual type and fat necrosis.  2 previous biopsy site changes identified.  One sentinel node on the right negative.  Pathologic stage T1cN0 stage Ia  I will call and let her know.  We will send an Oncotype.  She will not need to see radiation oncology.     Addendum  Ki-67 3 to 5%.    Pathology from RIGHT breast stereotactic biopsies returned as the followin-  Right axillary tail benign axillary vascular and adipose tissue no calcifications.  No atypical hyperplasia in situ or invasive carcinoma.  Minimal calcification seen.     2-  Right breast 4:00: Benign breast with columnar cell change, usual duct hyperplasia associated microcalcifications.     I called and spoke with Ms. Huddleston today.  We discussed that there was no malignancy on the right biopsy specimen.  We discussed that the radiologist recommended excision of the axillary tail lesion and follow-up right mammogram in 6 months for the regional calcifications upper outer quadrant.  At this juncture Mrs. Huddleston would like to proceed with a bilateral mastectomy.  She is a aware of the increased risk for developing breast cancer due to the radiation that she received for her lymphoma at a young age.  Her medical oncologist has also recommended bilateral mastectomy to her.  We discussed that there is no survival benefit to doing a bilateral and that it is too prevent a right breast cancer.  We discussed that this risk reduction on the right  is up to 90%.  We discussed that due to the imaging finding on the right that we would plan for a right sentinel lymph node biopsy.  Therefore we discussed the procedure of a bilateral total mastectomy, bilateral sentinel node biopsy, possible left axillary lymph node dissection, possible reconstruction.  We will not plan to perform a frozen section on the right.  The right side is risk reducing.     She understands that the risks of bleeding, infection, skin loss, injury to nerves or vessels in the axilla, lymphedema are the same on the right as they would be on the left.  I did instruct her to put her EMLA cream on both nipples now not just the left.     She will be seeing plastic surgery in May.  Her genetics [facility] panel [id number] returned as breast and gyn add on prostate- negative for mutation.  We willl et her know     She is started on the aromatase inhibitor.  She tells me that her PET scan ordered by Dr. Callahan was negative for any finding.  We spent 25 minutes in conversation today.     I will change her case request and consent accordingly.     Presented her case at April 17, 2020 tumor board.  Given a designation of C1 for a T1N0 estrogen positive malignancy.  She is on an aromatase inhibitor currently with Dr. Callahan.    Oncotype score 7-dated May 14, 2020.  With the risk of recurrence at 9 years on a hormone blocker alone of only 3%.         Final Diagnosis   1. Left [place] Lymph Node #1:               A. Two lymph nodes negative for metastatic carcinoma by routine staining (0/2).     2. Left Breast, Simple Skin Sparing Mastectomy:                 A. Invasive breast carcinoma of no special type (invasive ductal carcinoma)                            1. Invasive carcinoma measures 15 mm x 12 mm x 8 mm.                            2. Overall Oni grade II (tubular score = 3, nuclear score = 2,                                mitotic score = 1).                            3. No lymphovascular  invasion identified.                            4. Microcalcifications are present in the invasive component.               B. Associated ductal carcinoma in situ (DCIS)                            1. Scattered foci of DCIS span an area estimated at 20 mm x 15 mm x 10 mm.                            2. Intermediate grade solid and cribriform DCIS with focal comedo-like necrosis.                            3. Microcalcification present in DCIS.               C. All margins are negative for invasive carcinoma.                    Invasive carcinoma measures at least 26 mm from all margins of excision.                  D. All margins are negative for ductal carcinoma in situ (DCIS).                    DCIS measures at least 26 mm from all margins of excision.                  E. Focal biopsy site change and metallic clip are identified.               F. No Pagetoid involvement of skin nor nipple identified.               G. Focal atypical lobular hyperplasia (ALH) noted, not present at the margin.               H. Non-neoplastic breast tissue with fibrocystic change, adenosis and sclerosing adenosis noted.                    Microcalcification present in benign breast tissue.               I. Previous Biomarkers: Estrogen receptors: Positive (100%, 8/8), Progesterone receptors: Positive (100%, 8/8),                    HER/2-selene: Negative (score 1+), Ki-67 = 3-5% (per ET89-1977/BV20-28).     3. Right Breast, Simple Skin Sparing Mastectomy:               A. No in-situ nor infiltrating carcinoma identified.               B. Non-neoplastic breast tissue with fibrocystic change, adenosis, sclerosing adenosis, columnar cell change,                    duct hyperplasia of usual type and focal organizing fat necrosis.               C. No Pagetoid involvement of skin and/or nipple by malignancy identified.               D. No lymphovascular space invasion nor perineural invasion by tumor identified.               E. Multiple  "previous biopsy site changes (x2) identified.     4. Right [place] Lymph Node:               A. One lymph node negative for metastatic carcinoma by routine staining (0/1).      See synoptic for tumor details.  Jat/kds   Electronically signed by Bubba Barnett MD on 5/8/2020 at 1004   Synoptic Checklist   INVASIVE CARCINOMA OF THE BREAST: Resection   Breast.Invasive - 1, 2   8th Edition - Protocol posted: 9/10/2019        CLINICAL   Radiologic Finding   Mass or architectural distortion        Calcifications    SPECIMEN   Procedure   Total mastectomy    Specimen Laterality   Left    TUMOR   Tumor Site   Upper inner quadrant    Clock Position of Tumor Site   11 o'clock    Histologic Type   Invasive carcinoma of no special type (invasive ductal carcinoma, not otherwise specified)    Glandular (Acinar) / Tubular Differentiation   Score 3    Nuclear Pleomorphism   Score 2    Mitotic Rate   Score 1    Overall Grade   Grade 2 (scores of 6 or 7)    Tumor Size   Greatest dimension of largest invasive focus (Millimeters): 15 mm   Additional Dimension (Millimeters)   12 mm       8 mm   Tumor Focality   Single focus of invasive carcinoma    Ductal Carcinoma In Situ (DCIS)   Present        Negative for extensive intraductal component (EIC)    Size (Extent) of DCIS   Estimated size (extent) of DCIS is at least (Millimeters): 20 mm   Additional Dimension (Millimeters)   15 mm       10 mm   Architectural Patterns   Cribriform        Solid    Nuclear Grade   Grade II (intermediate)    Necrosis   Present, central (expansive \"comedo\" necrosis)    Lobular Carcinoma In Situ (LCIS)   No LCIS in specimen    Tumor Extent       Lymphovascular Invasion   Not identified    Dermal Lymphovascular Invasion   Not identified    Microcalcifications   Present in DCIS        Present in invasive carcinoma        Present in non-neoplastic tissue    Treatment Effect   No known presurgical therapy    MARGINS   Invasive Carcinoma Margins   Uninvolved " by invasive carcinoma    Distance from Closest Margin (Millimeters)   26 mm   Closest Margin(s)   Anterior    Distance from Other Margins       Anterior Margin (Millimeters)   26 mm   Posterior Margin (Millimeters)   30 mm   Superior Margin (Millimeters)   40 mm   Inferior Margin (Millimeters)   185 mm   Medial Margin (Millimeters)   60 mm   Lateral Margin (Millimeters)   140 mm   DCIS Margins   Uninvolved by DCIS    Distance from Closest Margin (Millimeters)   26 mm   Closest Margin(s)   Anterior    Distance from Other Margins       Anterior Margin (Millimeters)   26 mm   Posterior Margin (Millimeters)   30 mm   Superior Margin (Millimeters)   35 mm   Inferior Margin (Millimeters)   180 mm   Medial Margin (Millimeters)   60 mm   Lateral Margin (Millimeters)   140 mm   LYMPH NODES   Regional Lymph Nodes   Uninvolved by tumor cells    Total Number of Lymph Nodes Examined   2    Number of [place] Nodes Examined   2    PATHOLOGIC STAGE CLASSIFICATION (pTNM, AJCC 8th Edition)   Primary Tumor (pT)   pT1c    Regional Lymph Nodes Modifier   (sn): Only sentinel node(s) evaluated.    Regional Lymph Nodes (pN)   pN0    SPECIAL STUDIES   Breast Biomarker Testing Performed on Previous Biopsy       Estrogen Receptor (ER)   Positive (percentage): 100 %   Breast Biomarker Testing Performed on Previous Biopsy       Progesterone Receptor (PgR)   Positive (percentage): 100 %   Breast Biomarker Testing Performed on Previous Biopsy       HER2 (by immunohistochemistry)   Negative (Score 1+)    Breast Biomarker Testing Performed on Previous Biopsy   Ki-67    Percentage of Positive Nuclei   4 %   Testing Performed on Case Number   [id number]    .      Comment     Immunostain for P63 was performed on multiple representative sections of tumor utilizing appropriate controls. Foci of DCIS admixed with invasive tumor are confirmed by P63 staining. Representative sections were shared in consultation with Susanne Denton and , who concurred with  the diagnosis           Note from Dr. Shanthi Rodríguez dated April 2, 2020: Even if the right biopsies returned negative, this patient is at increased risk for developing a breast cancer on the right.  We recommended that she undergo bilateral mastectomy with immediate reconstruction.  Radiation would only be indicated if she had a locally advanced cancer greater than 5 cm or lymph node positivity.  She is planning a bilateral carotid duplex due to her previous radiation of her neck.  Due to her previous smoking history is planning low-dose screening CTs annually or a PET CT.  Spent time trying to convince the patient to quit smoking.  Patient is not wanting to proceed with any cessation at this time.  The patient has been ordered for a PET CT with Dr. Callahan.  We will get a Doppler and see her back.  She feels very comfortable in proceeding with bilateral mastectomy and immediate reconstruction with Dr. Tim and Dr. Waterhouse.     Note from Dr. Luis Angel Callahan dated April 2, 2020: We plan for a PET/CT since there is concern for possible axillary involvement.  Patient was started on anastrozole since we are going to be in a holding pattern.    Procedures:      Assessment:   Diagnosis Plan   1. History of Hodgkin's lymphoma     2. Malignant neoplasm of upper-inner quadrant of left female breast, unspecified estrogen receptor status (CMS/HCC)     3. Tobacco use     4. Marijuana use     1-  Diagnosis at age 17.  Took chemo radiation including ABVD at [facility].    2-  Left breast 10:30, 13.5 cm from the nipple.  1.2 cm mass on ultrasound, 1.0 cm mass with calcifications on mammogram.  Invasive mammary carcinoma, no special type, low-grade, 2, 2, 1, no duct carcinoma site 2, 2 cm largest, marker in good position.  Estrogen 100, progesterone 100, HER-2/selene 0.  Internal review invasive mammary carcinoma, no special type, 3, 2, 1, intermediate grade, no lymphovascular ration, 8 mm largest, associated duct  carcinoma in situ intermediate grade, solid and cribriform.  Estrogen 100, progesterone 100, cast 67 is pending, HER-2/selene 1+.  Clinical stage T1CN0 stage Ia.  Patient will need a mastectomy due to previous chest wall radiation.  Patient will take hormone blocking medication until the [place] pandemic has passed and will tentatively hope for surgery around June 2020.    Right breast posterior one third upper outer quadrant 10:00, 10 cm from the nipple 2 areas of enhancement on MRI measuring 1.8 cm and 6 mm.  BI-RADS 0.  Diagnostic imaging March 30, 2020 showed 5 mm ill-defined density with associated calcifications in the axillary tail region corresponding to an 8 mm nodular area of enhancement on MRI.  BI-RADS 4 recommend stereotactic biopsy.    Right breast 4:00, 1.4 cm area of enhancement on MRI at the site of mammographic calcifications, BI-RADS 4 recommend stereotactic biopsy.    Pathology from 5-6-20 bilateral total mastectomy, left axillary sentinel lymph node biopsy, immediate reconstruction with Dr. Guevara.  Pathology returned as left mastectomy, invasive mammary carcinoma, no special type, 1.5 cm, intermediate grade, 3, 2, 1, no lymphovascular invasion, associated duct carcinoma site 2, 2 cm, intermediate grade.  Margins are clear for both the invasive and the duct carcinoma in situ by 2.6 cm.  Focal atypical lobular hyperplasia is noted not present at a margin.  0 of 2 sentinel lymph nodes.  Right mastectomy non-neoplastic breast tissue with fibrocystic change, adenosis, sclerosing adenosis, columnar cell change, duct hyperplasia of the usual type and fat necrosis.  2 previous biopsy site changes identified.  One sentinel node on the right negative.  Pathologic stage T1cN0 stage Ia       Oncotype score 7-dated May 14, 2020.  With the risk of recurrence at 9 years on a hormone blocker alone of only 3%.    - WIll not need radiation.  -appt pending with Dr Callahan.      3-  1/2-1ppd for 30 years, stopped 2020  after breast ca dx    4-  Occasional marijuana    Plan:  Jina is doing very well at her postoperative visit.  She is healing nicely.  She will not need to see radiation oncology.  She has an appointment with Dr. Callahan pending.  She sees Dr. Guevara this Thursday for possible removal of 2 drains and possible fill.  I will see her back in 1 year with no imaging.  I asked her to call in the interim with concerns would be happy to see her back sooner.      Shreya Tim MD        Next Appointment:  Return in about 1 year (around 5/19/2021) for no imaging.      EMR Dragon/transcription disclaimer:    Much of this encounter note is an electronic transcription/translocation of spoken language to printed text.  The electronic translation of spoken language may permit erroneous, or at times, nonsensical words or phrases to be inadvertently transcribed.  Although I have reviewed the note from such areas, some may still exist.

## 2020-05-21 LAB
CYTO UR: NORMAL
LAB AP CASE REPORT: NORMAL
LAB AP CLINICAL INFORMATION: NORMAL
LAB AP DIAGNOSIS COMMENT: NORMAL
LAB AP SYNOPTIC CHECKLIST: NORMAL
Lab: NORMAL
Lab: NORMAL
PATH REPORT.ADDENDUM SPEC: NORMAL
PATH REPORT.FINAL DX SPEC: NORMAL
PATH REPORT.GROSS SPEC: NORMAL

## 2020-06-10 ENCOUNTER — TELEPHONE (OUTPATIENT)
Dept: SURGERY | Facility: CLINIC | Age: 45
End: 2020-06-10

## 2020-06-10 NOTE — TELEPHONE ENCOUNTER
6-2-20 note dr queen- anastrazole start 4-2020 plan 5 yr.  Note from Dr Rodríguez same day- no indication for radiation.      Note from Luis Angel Queen dated December 10, 2020: Continue anastrozole for a total of 5 years.

## 2020-06-11 PROCEDURE — 88304 TISSUE EXAM BY PATHOLOGIST: CPT | Performed by: PLASTIC SURGERY

## 2020-06-12 ENCOUNTER — LAB REQUISITION (OUTPATIENT)
Dept: LAB | Facility: HOSPITAL | Age: 45
End: 2020-06-12

## 2020-06-12 DIAGNOSIS — Z00.00 ENCOUNTER FOR GENERAL ADULT MEDICAL EXAMINATION WITHOUT ABNORMAL FINDINGS: ICD-10-CM

## 2020-06-12 PROCEDURE — 87205 SMEAR GRAM STAIN: CPT | Performed by: PLASTIC SURGERY

## 2020-06-12 PROCEDURE — 87070 CULTURE OTHR SPECIMN AEROBIC: CPT | Performed by: PLASTIC SURGERY

## 2020-06-13 LAB
CYTO UR: NORMAL
LAB AP CASE REPORT: NORMAL
LAB AP CLINICAL INFORMATION: NORMAL
PATH REPORT.FINAL DX SPEC: NORMAL
PATH REPORT.GROSS SPEC: NORMAL

## 2020-06-15 LAB
BACTERIA SPEC AEROBE CULT: ABNORMAL
BACTERIA SPEC AEROBE CULT: ABNORMAL
GRAM STN SPEC: ABNORMAL
GRAM STN SPEC: ABNORMAL

## 2020-07-10 ENCOUNTER — HOSPITAL ENCOUNTER (INPATIENT)
Facility: HOSPITAL | Age: 45
LOS: 2 days | Discharge: HOME | End: 2020-07-15
Attending: EMERGENCY MEDICINE | Admitting: PLASTIC SURGERY

## 2020-07-10 DIAGNOSIS — N64.4 BREAST PAIN, LEFT: Primary | ICD-10-CM

## 2020-07-10 DIAGNOSIS — N64.89 RECURRENT SEROMA OF BREAST: ICD-10-CM

## 2020-07-10 DIAGNOSIS — Z85.3 HISTORY OF BREAST CANCER: ICD-10-CM

## 2020-07-10 DIAGNOSIS — N64.89 SEROMA OF BREAST: ICD-10-CM

## 2020-07-10 LAB
ALBUMIN SERPL-MCNC: 4.2 G/DL (ref 3.5–5.2)
ALBUMIN/GLOB SERPL: 1.8 G/DL
ALP SERPL-CCNC: 84 U/L (ref 39–117)
ALT SERPL W P-5'-P-CCNC: 78 U/L (ref 1–33)
ANION GAP SERPL CALCULATED.3IONS-SCNC: 8.4 MMOL/L (ref 5–15)
AST SERPL-CCNC: 47 U/L (ref 1–32)
BASOPHILS # BLD AUTO: 0.06 10*3/MM3 (ref 0–0.2)
BASOPHILS NFR BLD AUTO: 0.9 % (ref 0–1.5)
BILIRUB SERPL-MCNC: 0.6 MG/DL (ref 0–1.2)
BUN SERPL-MCNC: 10 MG/DL (ref 6–20)
BUN/CREAT SERPL: 12.5 (ref 7–25)
CALCIUM SPEC-SCNC: 9.2 MG/DL (ref 8.6–10.5)
CHLORIDE SERPL-SCNC: 104 MMOL/L (ref 98–107)
CO2 SERPL-SCNC: 25.6 MMOL/L (ref 22–29)
CREAT SERPL-MCNC: 0.8 MG/DL (ref 0.57–1)
DEPRECATED RDW RBC AUTO: 46.6 FL (ref 37–54)
EOSINOPHIL # BLD AUTO: 0.15 10*3/MM3 (ref 0–0.4)
EOSINOPHIL NFR BLD AUTO: 2.3 % (ref 0.3–6.2)
ERYTHROCYTE [DISTWIDTH] IN BLOOD BY AUTOMATED COUNT: 12.4 % (ref 12.3–15.4)
GFR SERPL CREATININE-BSD FRML MDRD: 77 ML/MIN/1.73
GLOBULIN UR ELPH-MCNC: 2.4 GM/DL
GLUCOSE SERPL-MCNC: 98 MG/DL (ref 65–99)
HCT VFR BLD AUTO: 40.5 % (ref 34–46.6)
HGB BLD-MCNC: 14.8 G/DL (ref 12–15.9)
IMM GRANULOCYTES # BLD AUTO: 0.05 10*3/MM3 (ref 0–0.05)
IMM GRANULOCYTES NFR BLD AUTO: 0.8 % (ref 0–0.5)
LYMPHOCYTES # BLD AUTO: 1.52 10*3/MM3 (ref 0.7–3.1)
LYMPHOCYTES NFR BLD AUTO: 23.8 % (ref 19.6–45.3)
MCH RBC QN AUTO: 37.8 PG (ref 26.6–33)
MCHC RBC AUTO-ENTMCNC: 36.5 G/DL (ref 31.5–35.7)
MCV RBC AUTO: 103.3 FL (ref 79–97)
MONOCYTES # BLD AUTO: 0.44 10*3/MM3 (ref 0.1–0.9)
MONOCYTES NFR BLD AUTO: 6.9 % (ref 5–12)
NEUTROPHILS NFR BLD AUTO: 4.18 10*3/MM3 (ref 1.7–7)
NEUTROPHILS NFR BLD AUTO: 65.3 % (ref 42.7–76)
NRBC BLD AUTO-RTO: 0 /100 WBC (ref 0–0.2)
PLATELET # BLD AUTO: 213 10*3/MM3 (ref 140–450)
PMV BLD AUTO: 9.7 FL (ref 6–12)
POTASSIUM SERPL-SCNC: 3.7 MMOL/L (ref 3.5–5.2)
PROT SERPL-MCNC: 6.6 G/DL (ref 6–8.5)
RBC # BLD AUTO: 3.92 10*6/MM3 (ref 3.77–5.28)
SODIUM SERPL-SCNC: 138 MMOL/L (ref 136–145)
WBC # BLD AUTO: 6.4 10*3/MM3 (ref 3.4–10.8)

## 2020-07-10 PROCEDURE — G0378 HOSPITAL OBSERVATION PER HR: HCPCS

## 2020-07-10 PROCEDURE — 80053 COMPREHEN METABOLIC PANEL: CPT | Performed by: NURSE PRACTITIONER

## 2020-07-10 PROCEDURE — 25010000002 MORPHINE PER 10 MG: Performed by: NURSE PRACTITIONER

## 2020-07-10 PROCEDURE — 99283 EMERGENCY DEPT VISIT LOW MDM: CPT

## 2020-07-10 PROCEDURE — 85025 COMPLETE CBC W/AUTO DIFF WBC: CPT | Performed by: NURSE PRACTITIONER

## 2020-07-10 PROCEDURE — 25010000002 PIPERACILLIN SOD-TAZOBACTAM PER 1 G: Performed by: NURSE PRACTITIONER

## 2020-07-10 PROCEDURE — 25010000002 ONDANSETRON PER 1 MG: Performed by: NURSE PRACTITIONER

## 2020-07-10 RX ORDER — SODIUM CHLORIDE 0.9 % (FLUSH) 0.9 %
10 SYRINGE (ML) INJECTION AS NEEDED
Status: DISCONTINUED | OUTPATIENT
Start: 2020-07-10 | End: 2020-07-15 | Stop reason: HOSPADM

## 2020-07-10 RX ORDER — MORPHINE SULFATE 2 MG/ML
4 INJECTION, SOLUTION INTRAMUSCULAR; INTRAVENOUS ONCE
Status: COMPLETED | OUTPATIENT
Start: 2020-07-10 | End: 2020-07-10

## 2020-07-10 RX ORDER — ONDANSETRON 2 MG/ML
4 INJECTION INTRAMUSCULAR; INTRAVENOUS ONCE
Status: COMPLETED | OUTPATIENT
Start: 2020-07-10 | End: 2020-07-10

## 2020-07-10 RX ADMIN — TAZOBACTAM SODIUM AND PIPERACILLIN SODIUM 3.38 G: 375; 3 INJECTION, SOLUTION INTRAVENOUS at 23:09

## 2020-07-10 RX ADMIN — MORPHINE SULFATE 4 MG: 2 INJECTION, SOLUTION INTRAMUSCULAR; INTRAVENOUS at 23:00

## 2020-07-10 RX ADMIN — ONDANSETRON 4 MG: 2 INJECTION INTRAMUSCULAR; INTRAVENOUS at 23:01

## 2020-07-11 PROBLEM — J45.909 ASTHMA: Status: ACTIVE | Noted: 2020-07-11

## 2020-07-11 PROBLEM — K21.9 GERD WITHOUT ESOPHAGITIS: Status: ACTIVE | Noted: 2020-07-11

## 2020-07-11 PROBLEM — R00.0 TACHYCARDIA: Status: ACTIVE | Noted: 2020-07-11

## 2020-07-11 LAB
ANION GAP SERPL CALCULATED.3IONS-SCNC: 8.8 MMOL/L (ref 5–15)
BUN SERPL-MCNC: 10 MG/DL (ref 6–20)
BUN/CREAT SERPL: 12.3 (ref 7–25)
CALCIUM SPEC-SCNC: 8.3 MG/DL (ref 8.6–10.5)
CHLORIDE SERPL-SCNC: 106 MMOL/L (ref 98–107)
CO2 SERPL-SCNC: 22.2 MMOL/L (ref 22–29)
CREAT SERPL-MCNC: 0.81 MG/DL (ref 0.57–1)
DEPRECATED RDW RBC AUTO: 47.1 FL (ref 37–54)
ERYTHROCYTE [DISTWIDTH] IN BLOOD BY AUTOMATED COUNT: 12.2 % (ref 12.3–15.4)
GFR SERPL CREATININE-BSD FRML MDRD: 76 ML/MIN/1.73
GLUCOSE SERPL-MCNC: 94 MG/DL (ref 65–99)
HCT VFR BLD AUTO: 39.1 % (ref 34–46.6)
HGB BLD-MCNC: 13.9 G/DL (ref 12–15.9)
MCH RBC QN AUTO: 37 PG (ref 26.6–33)
MCHC RBC AUTO-ENTMCNC: 35.5 G/DL (ref 31.5–35.7)
MCV RBC AUTO: 104 FL (ref 79–97)
PLATELET # BLD AUTO: 185 10*3/MM3 (ref 140–450)
PMV BLD AUTO: 9.8 FL (ref 6–12)
POTASSIUM SERPL-SCNC: 3.9 MMOL/L (ref 3.5–5.2)
RBC # BLD AUTO: 3.76 10*6/MM3 (ref 3.77–5.28)
SODIUM SERPL-SCNC: 137 MMOL/L (ref 136–145)
WBC # BLD AUTO: 5.85 10*3/MM3 (ref 3.4–10.8)

## 2020-07-11 PROCEDURE — 0H9U3ZX DRAINAGE OF LEFT BREAST, PERCUTANEOUS APPROACH, DIAGNOSTIC: ICD-10-PCS | Performed by: PLASTIC SURGERY

## 2020-07-11 PROCEDURE — 87015 SPECIMEN INFECT AGNT CONCNTJ: CPT | Performed by: PLASTIC SURGERY

## 2020-07-11 PROCEDURE — 87186 SC STD MICRODIL/AGAR DIL: CPT | Performed by: PLASTIC SURGERY

## 2020-07-11 PROCEDURE — 87205 SMEAR GRAM STAIN: CPT | Performed by: PLASTIC SURGERY

## 2020-07-11 PROCEDURE — 25010000002 ONDANSETRON PER 1 MG: Performed by: NURSE PRACTITIONER

## 2020-07-11 PROCEDURE — 87147 CULTURE TYPE IMMUNOLOGIC: CPT | Performed by: PLASTIC SURGERY

## 2020-07-11 PROCEDURE — 25010000002 VANCOMYCIN 10 G RECONSTITUTED SOLUTION: Performed by: NURSE PRACTITIONER

## 2020-07-11 PROCEDURE — 87070 CULTURE OTHR SPECIMN AEROBIC: CPT | Performed by: PLASTIC SURGERY

## 2020-07-11 PROCEDURE — 25010000002 MORPHINE PER 10 MG: Performed by: HOSPITALIST

## 2020-07-11 PROCEDURE — 80048 BASIC METABOLIC PNL TOTAL CA: CPT | Performed by: NURSE PRACTITIONER

## 2020-07-11 PROCEDURE — G0378 HOSPITAL OBSERVATION PER HR: HCPCS

## 2020-07-11 PROCEDURE — 85027 COMPLETE CBC AUTOMATED: CPT | Performed by: NURSE PRACTITIONER

## 2020-07-11 RX ORDER — ONDANSETRON 2 MG/ML
4 INJECTION INTRAMUSCULAR; INTRAVENOUS EVERY 6 HOURS PRN
Status: DISCONTINUED | OUTPATIENT
Start: 2020-07-11 | End: 2020-07-15 | Stop reason: HOSPADM

## 2020-07-11 RX ORDER — ONDANSETRON 4 MG/1
4 TABLET, FILM COATED ORAL EVERY 6 HOURS PRN
Status: DISCONTINUED | OUTPATIENT
Start: 2020-07-11 | End: 2020-07-15 | Stop reason: HOSPADM

## 2020-07-11 RX ORDER — DESVENLAFAXINE 25 MG/1
25 TABLET, EXTENDED RELEASE ORAL DAILY
Status: DISCONTINUED | OUTPATIENT
Start: 2020-07-11 | End: 2020-07-15 | Stop reason: HOSPADM

## 2020-07-11 RX ORDER — SODIUM CHLORIDE 0.9 % (FLUSH) 0.9 %
10 SYRINGE (ML) INJECTION AS NEEDED
Status: DISCONTINUED | OUTPATIENT
Start: 2020-07-11 | End: 2020-07-15 | Stop reason: HOSPADM

## 2020-07-11 RX ORDER — ALUMINA, MAGNESIA, AND SIMETHICONE 2400; 2400; 240 MG/30ML; MG/30ML; MG/30ML
15 SUSPENSION ORAL EVERY 6 HOURS PRN
Status: DISCONTINUED | OUTPATIENT
Start: 2020-07-11 | End: 2020-07-15 | Stop reason: HOSPADM

## 2020-07-11 RX ORDER — ACETAMINOPHEN 325 MG/1
650 TABLET ORAL EVERY 4 HOURS PRN
Status: DISCONTINUED | OUTPATIENT
Start: 2020-07-11 | End: 2020-07-15 | Stop reason: HOSPADM

## 2020-07-11 RX ORDER — MORPHINE SULFATE 2 MG/ML
2 INJECTION, SOLUTION INTRAMUSCULAR; INTRAVENOUS
Status: DISCONTINUED | OUTPATIENT
Start: 2020-07-11 | End: 2020-07-15 | Stop reason: HOSPADM

## 2020-07-11 RX ORDER — BISACODYL 10 MG
10 SUPPOSITORY, RECTAL RECTAL DAILY PRN
Status: DISCONTINUED | OUTPATIENT
Start: 2020-07-11 | End: 2020-07-15 | Stop reason: HOSPADM

## 2020-07-11 RX ORDER — ACETAMINOPHEN 160 MG/5ML
650 SOLUTION ORAL EVERY 4 HOURS PRN
Status: DISCONTINUED | OUTPATIENT
Start: 2020-07-11 | End: 2020-07-15 | Stop reason: HOSPADM

## 2020-07-11 RX ORDER — HYDROCODONE BITARTRATE AND ACETAMINOPHEN 5; 325 MG/1; MG/1
1 TABLET ORAL EVERY 6 HOURS PRN
Status: DISCONTINUED | OUTPATIENT
Start: 2020-07-11 | End: 2020-07-13

## 2020-07-11 RX ORDER — BISACODYL 5 MG/1
5 TABLET, DELAYED RELEASE ORAL DAILY PRN
Status: DISCONTINUED | OUTPATIENT
Start: 2020-07-11 | End: 2020-07-15 | Stop reason: HOSPADM

## 2020-07-11 RX ORDER — ALBUTEROL SULFATE 2.5 MG/3ML
2.5 SOLUTION RESPIRATORY (INHALATION) EVERY 4 HOURS PRN
Status: DISCONTINUED | OUTPATIENT
Start: 2020-07-11 | End: 2020-07-15 | Stop reason: HOSPADM

## 2020-07-11 RX ORDER — ACETAMINOPHEN 650 MG/1
650 SUPPOSITORY RECTAL EVERY 4 HOURS PRN
Status: DISCONTINUED | OUTPATIENT
Start: 2020-07-11 | End: 2020-07-15 | Stop reason: HOSPADM

## 2020-07-11 RX ADMIN — MORPHINE SULFATE 2 MG: 2 INJECTION, SOLUTION INTRAMUSCULAR; INTRAVENOUS at 15:21

## 2020-07-11 RX ADMIN — MORPHINE SULFATE 2 MG: 2 INJECTION, SOLUTION INTRAMUSCULAR; INTRAVENOUS at 02:13

## 2020-07-11 RX ADMIN — MORPHINE SULFATE 2 MG: 2 INJECTION, SOLUTION INTRAMUSCULAR; INTRAVENOUS at 18:59

## 2020-07-11 RX ADMIN — ALUMINUM HYDROXIDE, MAGNESIUM HYDROXIDE, AND DIMETHICONE 15 ML: 400; 400; 40 SUSPENSION ORAL at 09:55

## 2020-07-11 RX ADMIN — MORPHINE SULFATE 2 MG: 2 INJECTION, SOLUTION INTRAMUSCULAR; INTRAVENOUS at 07:32

## 2020-07-11 RX ADMIN — ONDANSETRON 4 MG: 2 INJECTION INTRAMUSCULAR; INTRAVENOUS at 12:34

## 2020-07-11 RX ADMIN — MORPHINE SULFATE 2 MG: 2 INJECTION, SOLUTION INTRAMUSCULAR; INTRAVENOUS at 22:24

## 2020-07-11 RX ADMIN — DESVENLAFAXINE SUCCINATE 25 MG: 25 TABLET, EXTENDED RELEASE ORAL at 12:36

## 2020-07-11 RX ADMIN — VANCOMYCIN HYDROCHLORIDE 1500 MG: 10 INJECTION, POWDER, LYOPHILIZED, FOR SOLUTION INTRAVENOUS at 01:48

## 2020-07-11 RX ADMIN — MORPHINE SULFATE 2 MG: 2 INJECTION, SOLUTION INTRAMUSCULAR; INTRAVENOUS at 10:28

## 2020-07-11 RX ADMIN — ONDANSETRON 4 MG: 2 INJECTION INTRAMUSCULAR; INTRAVENOUS at 18:59

## 2020-07-12 ENCOUNTER — APPOINTMENT (OUTPATIENT)
Dept: CARDIOLOGY | Facility: HOSPITAL | Age: 45
End: 2020-07-12

## 2020-07-12 PROBLEM — N64.89 SEROMA OF BREAST: Status: ACTIVE | Noted: 2020-07-12

## 2020-07-12 PROBLEM — R10.13 DYSPEPSIA: Status: ACTIVE | Noted: 2020-07-12

## 2020-07-12 PROBLEM — R19.7 DIARRHEA: Status: ACTIVE | Noted: 2020-07-12

## 2020-07-12 PROBLEM — I38 MURMUR, DIASTOLIC: Status: ACTIVE | Noted: 2020-07-12

## 2020-07-12 PROBLEM — R60.9 EDEMA: Status: ACTIVE | Noted: 2020-07-12

## 2020-07-12 LAB
ANION GAP SERPL CALCULATED.3IONS-SCNC: 5.3 MMOL/L (ref 5–15)
AORTIC DIMENSIONLESS INDEX: 0.5 (DI)
BH CV ECHO MEAS - ACS: 1.3 CM
BH CV ECHO MEAS - AI DEC SLOPE: 243 CM/SEC^2
BH CV ECHO MEAS - AI MAX PG: 102 MMHG
BH CV ECHO MEAS - AI MAX VEL: 505 CM/SEC
BH CV ECHO MEAS - AI P1/2T: 608.7 MSEC
BH CV ECHO MEAS - AO ARCH DIAM (PROXIMAL TRANS.): 2 CM
BH CV ECHO MEAS - AO MAX PG (FULL): 8.9 MMHG
BH CV ECHO MEAS - AO MAX PG: 11.8 MMHG
BH CV ECHO MEAS - AO MEAN PG (FULL): 5 MMHG
BH CV ECHO MEAS - AO MEAN PG: 6 MMHG
BH CV ECHO MEAS - AO ROOT AREA (BSA CORRECTED): 1.6
BH CV ECHO MEAS - AO ROOT AREA: 6.2 CM^2
BH CV ECHO MEAS - AO ROOT DIAM: 2.8 CM
BH CV ECHO MEAS - AO V2 MAX: 172 CM/SEC
BH CV ECHO MEAS - AO V2 MEAN: 118 CM/SEC
BH CV ECHO MEAS - AO V2 VTI: 32.2 CM
BH CV ECHO MEAS - AVA(I,A): 1.4 CM^2
BH CV ECHO MEAS - AVA(I,D): 1.4 CM^2
BH CV ECHO MEAS - AVA(V,A): 1.4 CM^2
BH CV ECHO MEAS - AVA(V,D): 1.4 CM^2
BH CV ECHO MEAS - BSA(HAYCOCK): 1.8 M^2
BH CV ECHO MEAS - BSA: 1.8 M^2
BH CV ECHO MEAS - BZI_BMI: 28 KILOGRAMS/M^2
BH CV ECHO MEAS - BZI_METRIC_HEIGHT: 162.6 CM
BH CV ECHO MEAS - BZI_METRIC_WEIGHT: 73.9 KG
BH CV ECHO MEAS - DESC AORTA: 1.3 CM
BH CV ECHO MEAS - EDV(CUBED): 91.1 ML
BH CV ECHO MEAS - EDV(MOD-SP2): 102 ML
BH CV ECHO MEAS - EDV(MOD-SP4): 118 ML
BH CV ECHO MEAS - EDV(TEICH): 92.4 ML
BH CV ECHO MEAS - EF(CUBED): 60.6 %
BH CV ECHO MEAS - EF(MOD-BP): 54.2 %
BH CV ECHO MEAS - EF(MOD-SP2): 52 %
BH CV ECHO MEAS - EF(MOD-SP4): 55.9 %
BH CV ECHO MEAS - EF(TEICH): 52.3 %
BH CV ECHO MEAS - ESV(CUBED): 35.9 ML
BH CV ECHO MEAS - ESV(MOD-SP2): 49 ML
BH CV ECHO MEAS - ESV(MOD-SP4): 52 ML
BH CV ECHO MEAS - ESV(TEICH): 44.1 ML
BH CV ECHO MEAS - FS: 26.7 %
BH CV ECHO MEAS - IVS/LVPW: 0.9
BH CV ECHO MEAS - IVSD: 0.9 CM
BH CV ECHO MEAS - LAT PEAK E' VEL: 8.1 CM/SEC
BH CV ECHO MEAS - LV DIASTOLIC VOL/BSA (35-75): 65.8 ML/M^2
BH CV ECHO MEAS - LV MASS(C)D: 142.9 GRAMS
BH CV ECHO MEAS - LV MASS(C)DI: 79.7 GRAMS/M^2
BH CV ECHO MEAS - LV MAX PG: 2.9 MMHG
BH CV ECHO MEAS - LV MEAN PG: 1 MMHG
BH CV ECHO MEAS - LV SYSTOLIC VOL/BSA (12-30): 29 ML/M^2
BH CV ECHO MEAS - LV V1 MAX: 85.5 CM/SEC
BH CV ECHO MEAS - LV V1 MEAN: 56.1 CM/SEC
BH CV ECHO MEAS - LV V1 VTI: 16.4 CM
BH CV ECHO MEAS - LVIDD: 4.5 CM
BH CV ECHO MEAS - LVIDS: 3.3 CM
BH CV ECHO MEAS - LVLD AP2: 8.2 CM
BH CV ECHO MEAS - LVLD AP4: 8.4 CM
BH CV ECHO MEAS - LVLS AP2: 7.5 CM
BH CV ECHO MEAS - LVLS AP4: 7.4 CM
BH CV ECHO MEAS - LVOT AREA (M): 2.8 CM^2
BH CV ECHO MEAS - LVOT AREA: 2.8 CM^2
BH CV ECHO MEAS - LVOT DIAM: 1.9 CM
BH CV ECHO MEAS - LVPWD: 1 CM
BH CV ECHO MEAS - MED PEAK E' VEL: 8.1 CM/SEC
BH CV ECHO MEAS - MR MAX PG: 126.3 MMHG
BH CV ECHO MEAS - MR MAX VEL: 562 CM/SEC
BH CV ECHO MEAS - MR MEAN PG: 84 MMHG
BH CV ECHO MEAS - MR MEAN VEL: 436 CM/SEC
BH CV ECHO MEAS - MR VTI: 182 CM
BH CV ECHO MEAS - MV A DUR: 0.14 SEC
BH CV ECHO MEAS - MV A MAX VEL: 162 CM/SEC
BH CV ECHO MEAS - MV DEC SLOPE: 821 CM/SEC^2
BH CV ECHO MEAS - MV DEC TIME: 240 SEC
BH CV ECHO MEAS - MV E MAX VEL: 180 CM/SEC
BH CV ECHO MEAS - MV E/A: 1.1
BH CV ECHO MEAS - MV MAX PG: 17.7 MMHG
BH CV ECHO MEAS - MV MEAN PG: 10 MMHG
BH CV ECHO MEAS - MV P1/2T MAX VEL: 218.7 CM/SEC
BH CV ECHO MEAS - MV P1/2T: 78 MSEC
BH CV ECHO MEAS - MV V2 MAX: 209.5 CM/SEC
BH CV ECHO MEAS - MV V2 MEAN: 148 CM/SEC
BH CV ECHO MEAS - MV V2 VTI: 60.5 CM
BH CV ECHO MEAS - MVA P1/2T LCG: 1 CM^2
BH CV ECHO MEAS - MVA(P1/2T): 2.8 CM^2
BH CV ECHO MEAS - MVA(VTI): 0.77 CM^2
BH CV ECHO MEAS - PA ACC TIME: 0.09 SEC
BH CV ECHO MEAS - PA MAX PG (FULL): 2.2 MMHG
BH CV ECHO MEAS - PA MAX PG: 3.9 MMHG
BH CV ECHO MEAS - PA PR(ACCEL): 38.5 MMHG
BH CV ECHO MEAS - PA V2 MAX: 98.2 CM/SEC
BH CV ECHO MEAS - PULM A REVS DUR: 0.12 SEC
BH CV ECHO MEAS - PULM A REVS VEL: 34.1 CM/SEC
BH CV ECHO MEAS - PULM DIAS VEL: 75.7 CM/SEC
BH CV ECHO MEAS - PULM S/D: 0.51
BH CV ECHO MEAS - PULM SYS VEL: 38.8 CM/SEC
BH CV ECHO MEAS - PVA(V,A): 1.5 CM^2
BH CV ECHO MEAS - PVA(V,D): 1.5 CM^2
BH CV ECHO MEAS - QP/QS: 0.67
BH CV ECHO MEAS - RAP SYSTOLE: 8 MMHG
BH CV ECHO MEAS - RV MAX PG: 1.6 MMHG
BH CV ECHO MEAS - RV MEAN PG: 1 MMHG
BH CV ECHO MEAS - RV V1 MAX: 63.5 CM/SEC
BH CV ECHO MEAS - RV V1 MEAN: 42.1 CM/SEC
BH CV ECHO MEAS - RV V1 VTI: 13.7 CM
BH CV ECHO MEAS - RVOT AREA: 2.3 CM^2
BH CV ECHO MEAS - RVOT DIAM: 1.7 CM
BH CV ECHO MEAS - RVSP: 27.2 MMHG
BH CV ECHO MEAS - SI(AO): 110.6 ML/M^2
BH CV ECHO MEAS - SI(CUBED): 30.8 ML/M^2
BH CV ECHO MEAS - SI(LVOT): 25.9 ML/M^2
BH CV ECHO MEAS - SI(MOD-SP2): 29.6 ML/M^2
BH CV ECHO MEAS - SI(MOD-SP4): 36.8 ML/M^2
BH CV ECHO MEAS - SI(TEICH): 26.9 ML/M^2
BH CV ECHO MEAS - SV(AO): 198.3 ML
BH CV ECHO MEAS - SV(CUBED): 55.2 ML
BH CV ECHO MEAS - SV(LVOT): 46.5 ML
BH CV ECHO MEAS - SV(MOD-SP2): 53 ML
BH CV ECHO MEAS - SV(MOD-SP4): 66 ML
BH CV ECHO MEAS - SV(RVOT): 31.1 ML
BH CV ECHO MEAS - SV(TEICH): 48.3 ML
BH CV ECHO MEAS - TAPSE (>1.6): 2.3 CM
BH CV ECHO MEAS - TR MAX VEL: 219 CM/SEC
BH CV ECHO MEASUREMENTS AVERAGE E/E' RATIO: 22.22
BH CV VAS BP RIGHT ARM: NORMAL MMHG
BH CV XLRA - RV BASE: 3 CM
BH CV XLRA - RV LENGTH: 6.8 CM
BH CV XLRA - RV MID: 1.7 CM
BH CV XLRA - TDI S': 12 CM/SEC
BUN SERPL-MCNC: 8 MG/DL (ref 6–20)
BUN/CREAT SERPL: 9.4 (ref 7–25)
CALCIUM SPEC-SCNC: 8.6 MG/DL (ref 8.6–10.5)
CHLORIDE SERPL-SCNC: 105 MMOL/L (ref 98–107)
CO2 SERPL-SCNC: 26.7 MMOL/L (ref 22–29)
CREAT SERPL-MCNC: 0.85 MG/DL (ref 0.57–1)
DEPRECATED RDW RBC AUTO: 47.4 FL (ref 37–54)
ERYTHROCYTE [DISTWIDTH] IN BLOOD BY AUTOMATED COUNT: 12.4 % (ref 12.3–15.4)
GFR SERPL CREATININE-BSD FRML MDRD: 72 ML/MIN/1.73
GLUCOSE SERPL-MCNC: 93 MG/DL (ref 65–99)
HCT VFR BLD AUTO: 38.5 % (ref 34–46.6)
HGB BLD-MCNC: 13.7 G/DL (ref 12–15.9)
LEFT ATRIUM VOLUME INDEX: 32 ML/M2
LEFT ATRIUM VOLUME: 55 CM3
MAXIMAL PREDICTED HEART RATE: 173 BPM
MCH RBC QN AUTO: 36.8 PG (ref 26.6–33)
MCHC RBC AUTO-ENTMCNC: 35.6 G/DL (ref 31.5–35.7)
MCV RBC AUTO: 103.5 FL (ref 79–97)
PLATELET # BLD AUTO: 184 10*3/MM3 (ref 140–450)
PMV BLD AUTO: 9.6 FL (ref 6–12)
POTASSIUM SERPL-SCNC: 4.3 MMOL/L (ref 3.5–5.2)
RBC # BLD AUTO: 3.72 10*6/MM3 (ref 3.77–5.28)
SODIUM SERPL-SCNC: 137 MMOL/L (ref 136–145)
STRESS TARGET HR: 147 BPM
WBC # BLD AUTO: 5.11 10*3/MM3 (ref 3.4–10.8)

## 2020-07-12 PROCEDURE — 93306 TTE W/DOPPLER COMPLETE: CPT

## 2020-07-12 PROCEDURE — 80048 BASIC METABOLIC PNL TOTAL CA: CPT | Performed by: NURSE PRACTITIONER

## 2020-07-12 PROCEDURE — G0378 HOSPITAL OBSERVATION PER HR: HCPCS

## 2020-07-12 PROCEDURE — 25010000002 ONDANSETRON PER 1 MG: Performed by: NURSE PRACTITIONER

## 2020-07-12 PROCEDURE — 85027 COMPLETE CBC AUTOMATED: CPT | Performed by: NURSE PRACTITIONER

## 2020-07-12 PROCEDURE — 25010000002 MORPHINE PER 10 MG: Performed by: HOSPITALIST

## 2020-07-12 PROCEDURE — 93306 TTE W/DOPPLER COMPLETE: CPT | Performed by: INTERNAL MEDICINE

## 2020-07-12 RX ORDER — PANTOPRAZOLE SODIUM 40 MG/1
40 TABLET, DELAYED RELEASE ORAL
Status: DISCONTINUED | OUTPATIENT
Start: 2020-07-12 | End: 2020-07-15 | Stop reason: HOSPADM

## 2020-07-12 RX ORDER — ANASTROZOLE 1 MG/1
1 TABLET ORAL DAILY
Status: DISCONTINUED | OUTPATIENT
Start: 2020-07-12 | End: 2020-07-15 | Stop reason: HOSPADM

## 2020-07-12 RX ORDER — L.ACID,PARA/B.BIFIDUM/S.THERM 8B CELL
1 CAPSULE ORAL DAILY
Status: DISCONTINUED | OUTPATIENT
Start: 2020-07-12 | End: 2020-07-15 | Stop reason: HOSPADM

## 2020-07-12 RX ADMIN — HYDROCODONE BITARTRATE AND ACETAMINOPHEN 1 TABLET: 5; 325 TABLET ORAL at 15:36

## 2020-07-12 RX ADMIN — MORPHINE SULFATE 2 MG: 2 INJECTION, SOLUTION INTRAMUSCULAR; INTRAVENOUS at 18:41

## 2020-07-12 RX ADMIN — HYDROCODONE BITARTRATE AND ACETAMINOPHEN 1 TABLET: 5; 325 TABLET ORAL at 09:42

## 2020-07-12 RX ADMIN — HYDROCODONE BITARTRATE AND ACETAMINOPHEN 1 TABLET: 5; 325 TABLET ORAL at 02:45

## 2020-07-12 RX ADMIN — Medication 1 CAPSULE: at 13:02

## 2020-07-12 RX ADMIN — MORPHINE SULFATE 2 MG: 2 INJECTION, SOLUTION INTRAMUSCULAR; INTRAVENOUS at 07:27

## 2020-07-12 RX ADMIN — MORPHINE SULFATE 2 MG: 2 INJECTION, SOLUTION INTRAMUSCULAR; INTRAVENOUS at 02:49

## 2020-07-12 RX ADMIN — PANTOPRAZOLE SODIUM 40 MG: 40 TABLET, DELAYED RELEASE ORAL at 13:02

## 2020-07-12 RX ADMIN — ONDANSETRON 4 MG: 2 INJECTION INTRAMUSCULAR; INTRAVENOUS at 18:45

## 2020-07-12 RX ADMIN — HYDROCODONE BITARTRATE AND ACETAMINOPHEN 1 TABLET: 5; 325 TABLET ORAL at 21:54

## 2020-07-12 RX ADMIN — DESVENLAFAXINE SUCCINATE 25 MG: 25 TABLET, EXTENDED RELEASE ORAL at 09:42

## 2020-07-12 RX ADMIN — MORPHINE SULFATE 2 MG: 2 INJECTION, SOLUTION INTRAMUSCULAR; INTRAVENOUS at 21:54

## 2020-07-12 RX ADMIN — MORPHINE SULFATE 2 MG: 2 INJECTION, SOLUTION INTRAMUSCULAR; INTRAVENOUS at 15:31

## 2020-07-12 RX ADMIN — MORPHINE SULFATE 2 MG: 2 INJECTION, SOLUTION INTRAMUSCULAR; INTRAVENOUS at 13:02

## 2020-07-12 RX ADMIN — ALUMINUM HYDROXIDE, MAGNESIUM HYDROXIDE, AND DIMETHICONE 15 ML: 400; 400; 40 SUSPENSION ORAL at 10:31

## 2020-07-12 RX ADMIN — ANASTROZOLE 1 MG: 1 TABLET ORAL at 15:31

## 2020-07-13 ENCOUNTER — DOCUMENTATION (OUTPATIENT)
Dept: SURGERY | Facility: HOSPITAL | Age: 45
End: 2020-07-13

## 2020-07-13 PROBLEM — R01.1 MURMUR, HEART: Status: ACTIVE | Noted: 2020-07-12

## 2020-07-13 PROBLEM — I38 VALVULAR HEART DISEASE: Status: ACTIVE | Noted: 2020-07-13

## 2020-07-13 PROCEDURE — 87205 SMEAR GRAM STAIN: CPT | Performed by: PLASTIC SURGERY

## 2020-07-13 PROCEDURE — 25010000002 MORPHINE PER 10 MG: Performed by: HOSPITALIST

## 2020-07-13 PROCEDURE — 87147 CULTURE TYPE IMMUNOLOGIC: CPT | Performed by: PLASTIC SURGERY

## 2020-07-13 PROCEDURE — 87070 CULTURE OTHR SPECIMN AEROBIC: CPT | Performed by: PLASTIC SURGERY

## 2020-07-13 PROCEDURE — 99243 OFF/OP CNSLTJ NEW/EST LOW 30: CPT | Performed by: INTERNAL MEDICINE

## 2020-07-13 RX ORDER — HYDROCODONE BITARTRATE AND ACETAMINOPHEN 5; 325 MG/1; MG/1
1 TABLET ORAL EVERY 4 HOURS PRN
Status: DISCONTINUED | OUTPATIENT
Start: 2020-07-13 | End: 2020-07-15 | Stop reason: HOSPADM

## 2020-07-13 RX ORDER — LIDOCAINE 50 MG/G
OINTMENT TOPICAL AS NEEDED
Status: DISCONTINUED | OUTPATIENT
Start: 2020-07-13 | End: 2020-07-15 | Stop reason: HOSPADM

## 2020-07-13 RX ADMIN — MORPHINE SULFATE 2 MG: 2 INJECTION, SOLUTION INTRAMUSCULAR; INTRAVENOUS at 08:48

## 2020-07-13 RX ADMIN — MORPHINE SULFATE 2 MG: 2 INJECTION, SOLUTION INTRAMUSCULAR; INTRAVENOUS at 11:49

## 2020-07-13 RX ADMIN — Medication 1 CAPSULE: at 08:51

## 2020-07-13 RX ADMIN — MORPHINE SULFATE 2 MG: 2 INJECTION, SOLUTION INTRAMUSCULAR; INTRAVENOUS at 15:34

## 2020-07-13 RX ADMIN — MORPHINE SULFATE 2 MG: 2 INJECTION, SOLUTION INTRAMUSCULAR; INTRAVENOUS at 23:01

## 2020-07-13 RX ADMIN — ANASTROZOLE 1 MG: 1 TABLET ORAL at 08:51

## 2020-07-13 RX ADMIN — HYDROCODONE BITARTRATE AND ACETAMINOPHEN 1 TABLET: 5; 325 TABLET ORAL at 11:49

## 2020-07-13 RX ADMIN — MORPHINE SULFATE 2 MG: 2 INJECTION, SOLUTION INTRAMUSCULAR; INTRAVENOUS at 19:21

## 2020-07-13 RX ADMIN — DESVENLAFAXINE SUCCINATE 25 MG: 25 TABLET, EXTENDED RELEASE ORAL at 08:51

## 2020-07-13 RX ADMIN — HYDROCODONE BITARTRATE AND ACETAMINOPHEN 1 TABLET: 5; 325 TABLET ORAL at 05:25

## 2020-07-13 RX ADMIN — MORPHINE SULFATE 2 MG: 2 INJECTION, SOLUTION INTRAMUSCULAR; INTRAVENOUS at 05:25

## 2020-07-13 RX ADMIN — HYDROCODONE BITARTRATE AND ACETAMINOPHEN 1 TABLET: 5; 325 TABLET ORAL at 23:01

## 2020-07-13 RX ADMIN — HYDROCODONE BITARTRATE AND ACETAMINOPHEN 1 TABLET: 5; 325 TABLET ORAL at 19:21

## 2020-07-13 RX ADMIN — PANTOPRAZOLE SODIUM 40 MG: 40 TABLET, DELAYED RELEASE ORAL at 05:25

## 2020-07-14 ENCOUNTER — ANESTHESIA EVENT (OUTPATIENT)
Dept: PERIOP | Facility: HOSPITAL | Age: 45
End: 2020-07-14

## 2020-07-14 ENCOUNTER — ANESTHESIA (OUTPATIENT)
Dept: PERIOP | Facility: HOSPITAL | Age: 45
End: 2020-07-14

## 2020-07-14 LAB — SARS-COV-2 RNA RESP QL NAA+PROBE: NOT DETECTED

## 2020-07-14 PROCEDURE — 25010000002 DEXAMETHASONE PER 1 MG: Performed by: ANESTHESIOLOGY

## 2020-07-14 PROCEDURE — C9803 HOPD COVID-19 SPEC COLLECT: HCPCS | Performed by: PLASTIC SURGERY

## 2020-07-14 PROCEDURE — 0W9B00Z DRAINAGE OF LEFT PLEURAL CAVITY WITH DRAINAGE DEVICE, OPEN APPROACH: ICD-10-PCS | Performed by: PLASTIC SURGERY

## 2020-07-14 PROCEDURE — 25010000002 MORPHINE PER 10 MG: Performed by: PLASTIC SURGERY

## 2020-07-14 PROCEDURE — 99223 1ST HOSP IP/OBS HIGH 75: CPT | Performed by: INTERNAL MEDICINE

## 2020-07-14 PROCEDURE — 25010000002 FENTANYL CITRATE (PF) 100 MCG/2ML SOLUTION: Performed by: NURSE ANESTHETIST, CERTIFIED REGISTERED

## 2020-07-14 PROCEDURE — 87075 CULTR BACTERIA EXCEPT BLOOD: CPT | Performed by: PLASTIC SURGERY

## 2020-07-14 PROCEDURE — 25010000002 FENTANYL CITRATE (PF) 100 MCG/2ML SOLUTION: Performed by: ANESTHESIOLOGY

## 2020-07-14 PROCEDURE — 25010000002 VANCOMYCIN 10 G RECONSTITUTED SOLUTION: Performed by: INTERNAL MEDICINE

## 2020-07-14 PROCEDURE — 25010000002 HYDROMORPHONE PER 4 MG: Performed by: ANESTHESIOLOGY

## 2020-07-14 PROCEDURE — 25010000002 MIDAZOLAM PER 1 MG: Performed by: ANESTHESIOLOGY

## 2020-07-14 PROCEDURE — 25010000002 ONDANSETRON PER 1 MG: Performed by: ANESTHESIOLOGY

## 2020-07-14 PROCEDURE — 0JD60ZZ EXTRACTION OF CHEST SUBCUTANEOUS TISSUE AND FASCIA, OPEN APPROACH: ICD-10-PCS | Performed by: PLASTIC SURGERY

## 2020-07-14 PROCEDURE — 0HPU0NZ REMOVAL OF TISSUE EXPANDER FROM LEFT BREAST, OPEN APPROACH: ICD-10-PCS | Performed by: PLASTIC SURGERY

## 2020-07-14 PROCEDURE — 25010000002 ONDANSETRON PER 1 MG: Performed by: NURSE PRACTITIONER

## 2020-07-14 PROCEDURE — 87205 SMEAR GRAM STAIN: CPT | Performed by: PLASTIC SURGERY

## 2020-07-14 PROCEDURE — 87070 CULTURE OTHR SPECIMN AEROBIC: CPT | Performed by: PLASTIC SURGERY

## 2020-07-14 PROCEDURE — 25010000002 MORPHINE PER 10 MG: Performed by: HOSPITALIST

## 2020-07-14 PROCEDURE — 87635 SARS-COV-2 COVID-19 AMP PRB: CPT | Performed by: PLASTIC SURGERY

## 2020-07-14 PROCEDURE — 25010000002 PROPOFOL 10 MG/ML EMULSION: Performed by: NURSE ANESTHETIST, CERTIFIED REGISTERED

## 2020-07-14 RX ORDER — SODIUM CHLORIDE, SODIUM LACTATE, POTASSIUM CHLORIDE, CALCIUM CHLORIDE 600; 310; 30; 20 MG/100ML; MG/100ML; MG/100ML; MG/100ML
9 INJECTION, SOLUTION INTRAVENOUS CONTINUOUS
Status: DISCONTINUED | OUTPATIENT
Start: 2020-07-14 | End: 2020-07-15 | Stop reason: HOSPADM

## 2020-07-14 RX ORDER — PROMETHAZINE HYDROCHLORIDE 25 MG/1
25 SUPPOSITORY RECTAL ONCE AS NEEDED
Status: DISCONTINUED | OUTPATIENT
Start: 2020-07-14 | End: 2020-07-14 | Stop reason: HOSPADM

## 2020-07-14 RX ORDER — DEXAMETHASONE SODIUM PHOSPHATE 10 MG/ML
INJECTION INTRAMUSCULAR; INTRAVENOUS AS NEEDED
Status: DISCONTINUED | OUTPATIENT
Start: 2020-07-14 | End: 2020-07-14 | Stop reason: SURG

## 2020-07-14 RX ORDER — PROMETHAZINE HYDROCHLORIDE 25 MG/1
25 TABLET ORAL ONCE AS NEEDED
Status: DISCONTINUED | OUTPATIENT
Start: 2020-07-14 | End: 2020-07-14 | Stop reason: HOSPADM

## 2020-07-14 RX ORDER — FENTANYL CITRATE 50 UG/ML
50 INJECTION, SOLUTION INTRAMUSCULAR; INTRAVENOUS
Status: DISCONTINUED | OUTPATIENT
Start: 2020-07-14 | End: 2020-07-14 | Stop reason: HOSPADM

## 2020-07-14 RX ORDER — PROPOFOL 10 MG/ML
VIAL (ML) INTRAVENOUS AS NEEDED
Status: DISCONTINUED | OUTPATIENT
Start: 2020-07-14 | End: 2020-07-14 | Stop reason: SURG

## 2020-07-14 RX ORDER — HYDROMORPHONE HYDROCHLORIDE 1 MG/ML
0.5 INJECTION, SOLUTION INTRAMUSCULAR; INTRAVENOUS; SUBCUTANEOUS
Status: DISCONTINUED | OUTPATIENT
Start: 2020-07-14 | End: 2020-07-14 | Stop reason: HOSPADM

## 2020-07-14 RX ORDER — LIDOCAINE HYDROCHLORIDE 20 MG/ML
INJECTION, SOLUTION INFILTRATION; PERINEURAL AS NEEDED
Status: DISCONTINUED | OUTPATIENT
Start: 2020-07-14 | End: 2020-07-14 | Stop reason: SURG

## 2020-07-14 RX ORDER — GLYCOPYRROLATE 0.2 MG/ML
INJECTION INTRAMUSCULAR; INTRAVENOUS AS NEEDED
Status: DISCONTINUED | OUTPATIENT
Start: 2020-07-14 | End: 2020-07-14 | Stop reason: SURG

## 2020-07-14 RX ORDER — ONDANSETRON 2 MG/ML
4 INJECTION INTRAMUSCULAR; INTRAVENOUS ONCE AS NEEDED
Status: DISCONTINUED | OUTPATIENT
Start: 2020-07-14 | End: 2020-07-14 | Stop reason: HOSPADM

## 2020-07-14 RX ORDER — MAGNESIUM HYDROXIDE 1200 MG/15ML
LIQUID ORAL AS NEEDED
Status: DISCONTINUED | OUTPATIENT
Start: 2020-07-14 | End: 2020-07-14 | Stop reason: HOSPADM

## 2020-07-14 RX ORDER — SODIUM CHLORIDE 0.9 % (FLUSH) 0.9 %
3 SYRINGE (ML) INJECTION EVERY 12 HOURS SCHEDULED
Status: DISCONTINUED | OUTPATIENT
Start: 2020-07-14 | End: 2020-07-14 | Stop reason: HOSPADM

## 2020-07-14 RX ORDER — FENTANYL CITRATE 50 UG/ML
INJECTION, SOLUTION INTRAMUSCULAR; INTRAVENOUS AS NEEDED
Status: DISCONTINUED | OUTPATIENT
Start: 2020-07-14 | End: 2020-07-14 | Stop reason: SURG

## 2020-07-14 RX ORDER — LIDOCAINE HYDROCHLORIDE 10 MG/ML
0.5 INJECTION, SOLUTION EPIDURAL; INFILTRATION; INTRACAUDAL; PERINEURAL ONCE AS NEEDED
Status: DISCONTINUED | OUTPATIENT
Start: 2020-07-14 | End: 2020-07-14 | Stop reason: HOSPADM

## 2020-07-14 RX ORDER — BUPIVACAINE HYDROCHLORIDE AND EPINEPHRINE 5; 5 MG/ML; UG/ML
INJECTION, SOLUTION PERINEURAL AS NEEDED
Status: DISCONTINUED | OUTPATIENT
Start: 2020-07-14 | End: 2020-07-14 | Stop reason: HOSPADM

## 2020-07-14 RX ORDER — PROMETHAZINE HYDROCHLORIDE 25 MG/ML
6.25 INJECTION, SOLUTION INTRAMUSCULAR; INTRAVENOUS ONCE AS NEEDED
Status: DISCONTINUED | OUTPATIENT
Start: 2020-07-14 | End: 2020-07-14 | Stop reason: HOSPADM

## 2020-07-14 RX ORDER — ENALAPRILAT 2.5 MG/2ML
0.62 INJECTION INTRAVENOUS ONCE AS NEEDED
Status: DISCONTINUED | OUTPATIENT
Start: 2020-07-14 | End: 2020-07-14 | Stop reason: HOSPADM

## 2020-07-14 RX ORDER — ONDANSETRON 2 MG/ML
INJECTION INTRAMUSCULAR; INTRAVENOUS AS NEEDED
Status: DISCONTINUED | OUTPATIENT
Start: 2020-07-14 | End: 2020-07-14 | Stop reason: SURG

## 2020-07-14 RX ORDER — HYDROCODONE BITARTRATE AND ACETAMINOPHEN 7.5; 325 MG/1; MG/1
1 TABLET ORAL EVERY 4 HOURS PRN
Status: DISCONTINUED | OUTPATIENT
Start: 2020-07-14 | End: 2020-07-14 | Stop reason: HOSPADM

## 2020-07-14 RX ORDER — SODIUM CHLORIDE 0.9 % (FLUSH) 0.9 %
3-10 SYRINGE (ML) INJECTION AS NEEDED
Status: DISCONTINUED | OUTPATIENT
Start: 2020-07-14 | End: 2020-07-14 | Stop reason: HOSPADM

## 2020-07-14 RX ORDER — FAMOTIDINE 10 MG/ML
20 INJECTION, SOLUTION INTRAVENOUS ONCE
Status: COMPLETED | OUTPATIENT
Start: 2020-07-14 | End: 2020-07-14

## 2020-07-14 RX ORDER — VANCOMYCIN HYDROCHLORIDE 1 G/200ML
1000 INJECTION, SOLUTION INTRAVENOUS EVERY 12 HOURS
Status: DISCONTINUED | OUTPATIENT
Start: 2020-07-15 | End: 2020-07-15

## 2020-07-14 RX ORDER — MIDAZOLAM HYDROCHLORIDE 1 MG/ML
1 INJECTION INTRAMUSCULAR; INTRAVENOUS
Status: DISCONTINUED | OUTPATIENT
Start: 2020-07-14 | End: 2020-07-14 | Stop reason: HOSPADM

## 2020-07-14 RX ADMIN — MORPHINE SULFATE 2 MG: 2 INJECTION, SOLUTION INTRAMUSCULAR; INTRAVENOUS at 11:38

## 2020-07-14 RX ADMIN — HYDROMORPHONE HYDROCHLORIDE 0.5 MG: 1 INJECTION, SOLUTION INTRAMUSCULAR; INTRAVENOUS; SUBCUTANEOUS at 19:15

## 2020-07-14 RX ADMIN — ONDANSETRON 4 MG: 2 INJECTION INTRAMUSCULAR; INTRAVENOUS at 11:45

## 2020-07-14 RX ADMIN — MIDAZOLAM 2 MG: 1 INJECTION INTRAMUSCULAR; INTRAVENOUS at 16:44

## 2020-07-14 RX ADMIN — MORPHINE SULFATE 2 MG: 2 INJECTION, SOLUTION INTRAMUSCULAR; INTRAVENOUS at 20:56

## 2020-07-14 RX ADMIN — MORPHINE SULFATE 2 MG: 2 INJECTION, SOLUTION INTRAMUSCULAR; INTRAVENOUS at 08:15

## 2020-07-14 RX ADMIN — MORPHINE SULFATE 2 MG: 2 INJECTION, SOLUTION INTRAMUSCULAR; INTRAVENOUS at 05:35

## 2020-07-14 RX ADMIN — FAMOTIDINE 20 MG: 10 INJECTION INTRAVENOUS at 15:59

## 2020-07-14 RX ADMIN — VANCOMYCIN HYDROCHLORIDE 1500 MG: 10 INJECTION, POWDER, LYOPHILIZED, FOR SOLUTION INTRAVENOUS at 15:59

## 2020-07-14 RX ADMIN — HYDROCODONE BITARTRATE AND ACETAMINOPHEN 1 TABLET: 5; 325 TABLET ORAL at 02:22

## 2020-07-14 RX ADMIN — HYDROCODONE BITARTRATE AND ACETAMINOPHEN 1 TABLET: 5; 325 TABLET ORAL at 20:49

## 2020-07-14 RX ADMIN — MORPHINE SULFATE 2 MG: 2 INJECTION, SOLUTION INTRAMUSCULAR; INTRAVENOUS at 02:22

## 2020-07-14 RX ADMIN — SODIUM CHLORIDE, POTASSIUM CHLORIDE, SODIUM LACTATE AND CALCIUM CHLORIDE 9 ML/HR: 600; 310; 30; 20 INJECTION, SOLUTION INTRAVENOUS at 15:59

## 2020-07-14 RX ADMIN — LIDOCAINE HYDROCHLORIDE 100 MG: 20 INJECTION, SOLUTION INFILTRATION; PERINEURAL at 17:20

## 2020-07-14 RX ADMIN — HYDROCODONE BITARTRATE AND ACETAMINOPHEN 1 TABLET: 5; 325 TABLET ORAL at 06:29

## 2020-07-14 RX ADMIN — GLYCOPYRROLATE 0.2 MG: 0.2 INJECTION INTRAMUSCULAR; INTRAVENOUS at 17:40

## 2020-07-14 RX ADMIN — FENTANYL CITRATE 100 MCG: 50 INJECTION INTRAMUSCULAR; INTRAVENOUS at 17:20

## 2020-07-14 RX ADMIN — PROPOFOL 170 MG: 10 INJECTION, EMULSION INTRAVENOUS at 17:20

## 2020-07-14 RX ADMIN — DESVENLAFAXINE SUCCINATE 25 MG: 25 TABLET, EXTENDED RELEASE ORAL at 08:12

## 2020-07-14 RX ADMIN — HYDROCODONE BITARTRATE AND ACETAMINOPHEN 1 TABLET: 5; 325 TABLET ORAL at 12:50

## 2020-07-14 RX ADMIN — ANASTROZOLE 1 MG: 1 TABLET ORAL at 08:12

## 2020-07-14 RX ADMIN — FENTANYL CITRATE 50 MCG: 50 INJECTION, SOLUTION INTRAMUSCULAR; INTRAVENOUS at 18:47

## 2020-07-14 RX ADMIN — DEXAMETHASONE SODIUM PHOSPHATE 6 MG: 10 INJECTION INTRAMUSCULAR; INTRAVENOUS at 17:41

## 2020-07-14 RX ADMIN — Medication 1 CAPSULE: at 08:12

## 2020-07-14 RX ADMIN — MORPHINE SULFATE 2 MG: 2 INJECTION, SOLUTION INTRAMUSCULAR; INTRAVENOUS at 14:29

## 2020-07-14 RX ADMIN — FENTANYL CITRATE 50 MCG: 50 INJECTION, SOLUTION INTRAMUSCULAR; INTRAVENOUS at 15:59

## 2020-07-14 RX ADMIN — FENTANYL CITRATE 50 MCG: 50 INJECTION, SOLUTION INTRAMUSCULAR; INTRAVENOUS at 19:00

## 2020-07-14 RX ADMIN — ONDANSETRON HYDROCHLORIDE 4 MG: 2 SOLUTION INTRAMUSCULAR; INTRAVENOUS at 17:57

## 2020-07-14 RX ADMIN — MORPHINE SULFATE 2 MG: 2 INJECTION, SOLUTION INTRAMUSCULAR; INTRAVENOUS at 23:41

## 2020-07-14 NOTE — ANESTHESIA PROCEDURE NOTES
Airway  Urgency: elective    Date/Time: 7/14/2020 5:21 PM  Airway not difficult    General Information and Staff    Patient location during procedure: OR  CRNA: Beata Wilkes CRNA    Indications and Patient Condition  Indications for airway management: airway protection    Preoxygenated: yes  Mask difficulty assessment: 1 - vent by mask    Final Airway Details  Final airway type: supraglottic airway      Successful airway: classic  Size 4    Number of attempts at approach: 1    Additional Comments  LMA placed easily.  Cuff MOP.

## 2020-07-14 NOTE — ANESTHESIA PREPROCEDURE EVALUATION
Anesthesia Evaluation     Patient summary reviewed and Nursing notes reviewed                Airway   Mallampati: II  Dental      Pulmonary    (+) a smoker Former, asthma,  Cardiovascular     ECG reviewed  Rhythm: regular  Rate: normal    (+) valvular problems/murmurs MR,       Neuro/Psych  (+) psychiatric history Bipolar,     GI/Hepatic/Renal/Endo    (+)  GERD,  renal disease,     Musculoskeletal     (+) back pain,   Abdominal    Substance History - negative use     OB/GYN negative ob/gyn ROS         Other      history of cancer                  Anesthesia Plan    ASA 2     general   (NPO after MN)  intravenous induction     Anesthetic plan, all risks, benefits, and alternatives have been provided, discussed and informed consent has been obtained with: patient.

## 2020-07-14 NOTE — ANESTHESIA POSTPROCEDURE EVALUATION
"Patient: Jina FERNANDO Huddleston    Procedure Summary     Date:  07/14/20 Room / Location:  [facility] OR  / [facility] MAIN OR    Anesthesia Start:  1713 Anesthesia Stop:  1837    Procedures:       INCISION AND DRAINAGE OF LEFT BREAST SEROMA, (Left Breast)      REMOVAL OF TISSUE EXPANDER (Left Breast) Diagnosis:      Surgeon:  Radha Gueavra MD Provider:  Gume Gustafson MD    Anesthesia Type:  general ASA Status:  2          Anesthesia Type: general    Vitals  Vitals Value Taken Time   /84 7/14/2020  7:00 PM   Temp 36.7 °C (98 °F) 7/14/2020  6:30 PM   Pulse 115 7/14/2020  7:00 PM   Resp 16 7/14/2020  7:00 PM   SpO2 98 % 7/14/2020  7:00 PM           Post Anesthesia Care and Evaluation    Patient location during evaluation: bedside  Patient participation: complete - patient participated  Level of consciousness: awake and alert  Pain management: adequate  Airway patency: patent  Anesthetic complications: No anesthetic complications  PONV Status: none  Cardiovascular status: acceptable and hemodynamically stable  Respiratory status: acceptable  Hydration status: acceptable    Comments: /84 (BP Location: Right arm, Patient Position: Lying)   Pulse 115   Temp 36.7 °C (98 °F) (Oral)   Resp 16   Ht 162.6 cm (64\")   Wt 73.9 kg (163 lb)   LMP  (LMP Unknown)   SpO2 98%   Breastfeeding No   BMI 27.98 kg/m²         "

## 2020-07-15 ENCOUNTER — TELEPHONE (OUTPATIENT)
Dept: INFECTIOUS DISEASES | Facility: CLINIC | Age: 45
End: 2020-07-15

## 2020-07-15 VITALS
TEMPERATURE: 97.6 F | OXYGEN SATURATION: 100 % | DIASTOLIC BLOOD PRESSURE: 91 MMHG | BODY MASS INDEX: 27.83 KG/M2 | RESPIRATION RATE: 18 BRPM | WEIGHT: 163 LBS | SYSTOLIC BLOOD PRESSURE: 145 MMHG | HEIGHT: 64 IN | HEART RATE: 105 BPM

## 2020-07-15 PROCEDURE — 25010000002 MORPHINE PER 10 MG: Performed by: PLASTIC SURGERY

## 2020-07-15 PROCEDURE — 99233 SBSQ HOSP IP/OBS HIGH 50: CPT | Performed by: INTERNAL MEDICINE

## 2020-07-15 PROCEDURE — 25010000002 VANCOMYCIN PER 500 MG: Performed by: PLASTIC SURGERY

## 2020-07-15 PROCEDURE — 25010000002 ONDANSETRON PER 1 MG: Performed by: PLASTIC SURGERY

## 2020-07-15 RX ORDER — UREA 10 %
3 LOTION (ML) TOPICAL NIGHTLY PRN
Status: DISCONTINUED | OUTPATIENT
Start: 2020-07-15 | End: 2020-07-15 | Stop reason: HOSPADM

## 2020-07-15 RX ORDER — FLUCONAZOLE 150 MG/1
150 TABLET ORAL EVERY 24 HOURS
Status: COMPLETED | OUTPATIENT
Start: 2020-07-15 | End: 2020-07-15

## 2020-07-15 RX ORDER — LINEZOLID 600 MG/1
600 TABLET, FILM COATED ORAL EVERY 12 HOURS SCHEDULED
Status: DISCONTINUED | OUTPATIENT
Start: 2020-07-15 | End: 2020-07-15 | Stop reason: HOSPADM

## 2020-07-15 RX ORDER — DIAZEPAM 2 MG/1
2 TABLET ORAL EVERY 8 HOURS PRN
Status: DISCONTINUED | OUTPATIENT
Start: 2020-07-15 | End: 2020-07-15 | Stop reason: HOSPADM

## 2020-07-15 RX ADMIN — VANCOMYCIN HYDROCHLORIDE 1000 MG: 1 INJECTION, SOLUTION INTRAVENOUS at 02:43

## 2020-07-15 RX ADMIN — HYDROCODONE BITARTRATE AND ACETAMINOPHEN 1 TABLET: 5; 325 TABLET ORAL at 10:36

## 2020-07-15 RX ADMIN — Medication 1 CAPSULE: at 08:31

## 2020-07-15 RX ADMIN — HYDROCODONE BITARTRATE AND ACETAMINOPHEN 1 TABLET: 5; 325 TABLET ORAL at 04:26

## 2020-07-15 RX ADMIN — Medication 3 MG: at 00:50

## 2020-07-15 RX ADMIN — DIAZEPAM 2 MG: 2 TABLET ORAL at 12:04

## 2020-07-15 RX ADMIN — PANTOPRAZOLE SODIUM 40 MG: 40 TABLET, DELAYED RELEASE ORAL at 07:09

## 2020-07-15 RX ADMIN — ANASTROZOLE 1 MG: 1 TABLET ORAL at 08:31

## 2020-07-15 RX ADMIN — DESVENLAFAXINE SUCCINATE 25 MG: 25 TABLET, EXTENDED RELEASE ORAL at 12:05

## 2020-07-15 RX ADMIN — HYDROCODONE BITARTRATE AND ACETAMINOPHEN 1 TABLET: 5; 325 TABLET ORAL at 00:34

## 2020-07-15 RX ADMIN — FLUCONAZOLE 150 MG: 150 TABLET ORAL at 16:12

## 2020-07-15 RX ADMIN — LINEZOLID 600 MG: 600 TABLET, FILM COATED ORAL at 16:07

## 2020-07-15 RX ADMIN — ONDANSETRON 4 MG: 2 INJECTION INTRAMUSCULAR; INTRAVENOUS at 01:57

## 2020-07-15 RX ADMIN — MORPHINE SULFATE 2 MG: 2 INJECTION, SOLUTION INTRAMUSCULAR; INTRAVENOUS at 02:22

## 2020-07-15 RX ADMIN — MORPHINE SULFATE 2 MG: 2 INJECTION, SOLUTION INTRAMUSCULAR; INTRAVENOUS at 08:31

## 2020-07-15 RX ADMIN — MORPHINE SULFATE 2 MG: 2 INJECTION, SOLUTION INTRAMUSCULAR; INTRAVENOUS at 05:26

## 2020-07-16 ENCOUNTER — DOCUMENTATION (OUTPATIENT)
Dept: INFECTIOUS DISEASES | Facility: CLINIC | Age: 45
End: 2020-07-16

## 2020-07-16 ENCOUNTER — READMISSION MANAGEMENT (OUTPATIENT)
Dept: CALL CENTER | Facility: HOSPITAL | Age: 45
End: 2020-07-16

## 2020-07-16 LAB
BACTERIA FLD CULT: ABNORMAL
BACTERIA SPEC AEROBE CULT: ABNORMAL
GRAM STN SPEC: ABNORMAL

## 2020-07-16 NOTE — OUTREACH NOTE
Prep Survey      Responses   [facility] patient discharged from?  [place]   Is LACE score < 7 ?  No   Eligibility  Readm Mgmt   Discharge diagnosis  surgery on 7/14/20 for I and D of L breast seroma and removal of tissue expander and drain placement.    COVID-19 Test Status  Negative   Does the patient have one of the following disease processes/diagnoses(primary or secondary)?  General Surgery   Does the patient have Home health ordered?  No   Is there a DME ordered?  No   Prep survey completed?  Yes          Greta Gomez RN

## 2020-07-16 NOTE — PROGRESS NOTES
Culture resulted as Staph epidermidis. I spoke w/ micro lab and they confirmed the isolate was susceptible to linezolid and will add that to the report.

## 2020-07-17 LAB
BACTERIA SPEC AEROBE CULT: NORMAL
BACTERIA SPEC AEROBE CULT: NORMAL
GRAM STN SPEC: NORMAL

## 2020-07-18 ENCOUNTER — READMISSION MANAGEMENT (OUTPATIENT)
Dept: CALL CENTER | Facility: HOSPITAL | Age: 45
End: 2020-07-18

## 2020-07-19 LAB
BACTERIA SPEC ANAEROBE CULT: NORMAL
BACTERIA SPEC ANAEROBE CULT: NORMAL

## 2020-07-25 ENCOUNTER — READMISSION MANAGEMENT (OUTPATIENT)
Dept: CALL CENTER | Facility: HOSPITAL | Age: 45
End: 2020-07-25

## 2020-07-25 NOTE — OUTREACH NOTE
General Surgery Week 2 Survey      Responses   [facility] patient discharged from?  [place]   Does the patient have one of the following disease processes/diagnoses(primary or secondary)?  General Surgery   Week 2 attempt successful?  Yes   Call start time  1705   Call end time  1707   Discharge diagnosis  surgery on 7/14/20 for I and D of L breast seroma and removal of tissue expander and drain placement.    Meds reviewed with patient/caregiver?  Yes   Is the patient having any side effects they believe may be caused by any medication additions or changes?  No   Does the patient have all medications related to this admission filled (includes all antibiotics, pain medications, etc.)  Yes   Is the patient taking all medications as directed (includes completed medication regime)?  Yes   Does the patient have a follow up appointment scheduled with their surgeon?  Yes   Has the patient kept scheduled appointments due by today?  Yes   Has home health visited the patient within 72 hours of discharge?  N/A   Psychosocial issues?  No   Did the patient receive a copy of their discharge instructions?  Yes   Nursing interventions  Reviewed instructions with patient   What is the patient's perception of their health status since discharge?  Improving   Nursing interventions  Nurse provided patient education   Is the patient /caregiver able to teach back basic post-op care?  Take showers only when approved by MD-sponge bathe until then, No tub bath, swimming, or hot tub until instructed by MD, Keep incision areas clean,dry and protected, Do not remove steri-strips, Lifting as instructed by MD in discharge instructions   Is the patient/caregiver able to teach back signs and symptoms of incisional infection?  Increased redness, swelling or pain at the incisonal site, Incisional warmth, Increased drainage or bleeding, Pus or odor from incision, Fever   Is the patient/caregiver able to teach back steps to recovery at home?   Set small, achievable goals for return to baseline health, Rest and rebuild strength, gradually increase activity   Is the patient/caregiver able to teach back the hierarchy of who to call/visit for symptoms/problems? PCP, Specialist, Home health nurse, Urgent Care, ED, 911  Yes   Additional teach back comments  pt states is free of pain and signs of infection   Week 2 call completed?  Yes          María Mcgowan RN

## 2020-07-28 DIAGNOSIS — I34.0 NONRHEUMATIC MITRAL VALVE REGURGITATION: Primary | ICD-10-CM

## 2020-08-05 ENCOUNTER — APPOINTMENT (OUTPATIENT)
Dept: CARDIOLOGY | Facility: HOSPITAL | Age: 45
End: 2020-08-05

## 2020-08-05 ENCOUNTER — READMISSION MANAGEMENT (OUTPATIENT)
Dept: CALL CENTER | Facility: HOSPITAL | Age: 45
End: 2020-08-05

## 2020-08-05 NOTE — OUTREACH NOTE
General Surgery Week 3 Survey      Responses   [facility] [place] patient discharged from?  [place]   Does the patient have one of the following disease processes/diagnoses(primary or secondary)?  General Surgery   Week 3 attempt successful?  No   Unsuccessful attempts  Attempt 1          Aydee Bean RN

## 2020-11-02 ENCOUNTER — TRANSCRIBE ORDERS (OUTPATIENT)
Dept: PREADMISSION TESTING | Facility: HOSPITAL | Age: 45
End: 2020-11-02

## 2020-11-02 DIAGNOSIS — Z01.818 OTHER SPECIFIED PRE-OPERATIVE EXAMINATION: Primary | ICD-10-CM

## 2020-11-05 ENCOUNTER — APPOINTMENT (OUTPATIENT)
Dept: PREADMISSION TESTING | Facility: HOSPITAL | Age: 45
End: 2020-11-05

## 2020-11-05 VITALS
DIASTOLIC BLOOD PRESSURE: 87 MMHG | WEIGHT: 164 LBS | TEMPERATURE: 97.9 F | SYSTOLIC BLOOD PRESSURE: 134 MMHG | RESPIRATION RATE: 20 BRPM | OXYGEN SATURATION: 100 % | HEART RATE: 90 BPM | HEIGHT: 64 IN | BODY MASS INDEX: 28 KG/M2

## 2020-11-05 LAB
ANION GAP SERPL CALCULATED.3IONS-SCNC: 6.4 MMOL/L (ref 5–15)
BUN SERPL-MCNC: 20 MG/DL (ref 6–20)
BUN/CREAT SERPL: 21.1 (ref 7–25)
CALCIUM SPEC-SCNC: 9.1 MG/DL (ref 8.6–10.5)
CHLORIDE SERPL-SCNC: 105 MMOL/L (ref 98–107)
CO2 SERPL-SCNC: 26.6 MMOL/L (ref 22–29)
CREAT SERPL-MCNC: 0.95 MG/DL (ref 0.57–1)
DEPRECATED RDW RBC AUTO: 48.2 FL (ref 37–54)
ERYTHROCYTE [DISTWIDTH] IN BLOOD BY AUTOMATED COUNT: 13 % (ref 12.3–15.4)
GFR SERPL CREATININE-BSD FRML MDRD: 63 ML/MIN/1.73
GLUCOSE SERPL-MCNC: 84 MG/DL (ref 65–99)
HCT VFR BLD AUTO: 42.7 % (ref 34–46.6)
HGB BLD-MCNC: 14.8 G/DL (ref 12–15.9)
MCH RBC QN AUTO: 34.8 PG (ref 26.6–33)
MCHC RBC AUTO-ENTMCNC: 34.7 G/DL (ref 31.5–35.7)
MCV RBC AUTO: 100.5 FL (ref 79–97)
PLATELET # BLD AUTO: 191 10*3/MM3 (ref 140–450)
PMV BLD AUTO: 9.6 FL (ref 6–12)
POTASSIUM SERPL-SCNC: 4.7 MMOL/L (ref 3.5–5.2)
RBC # BLD AUTO: 4.25 10*6/MM3 (ref 3.77–5.28)
SODIUM SERPL-SCNC: 138 MMOL/L (ref 136–145)
WBC # BLD AUTO: 8.41 10*3/MM3 (ref 3.4–10.8)

## 2020-11-05 PROCEDURE — 80048 BASIC METABOLIC PNL TOTAL CA: CPT | Performed by: PLASTIC SURGERY

## 2020-11-05 PROCEDURE — G0480 DRUG TEST DEF 1-7 CLASSES: HCPCS | Performed by: PLASTIC SURGERY

## 2020-11-05 PROCEDURE — 85027 COMPLETE CBC AUTOMATED: CPT | Performed by: PLASTIC SURGERY

## 2020-11-05 PROCEDURE — 36415 COLL VENOUS BLD VENIPUNCTURE: CPT

## 2020-11-05 RX ORDER — MELOXICAM 15 MG/1
15 TABLET ORAL DAILY
COMMUNITY
End: 2022-07-26 | Stop reason: ALTCHOICE

## 2020-11-05 NOTE — DISCHARGE INSTRUCTIONS
Take the following medications the morning of surgery:    NONE    ARRIVE TO MAIN SURGERY AT 1:30 PM ON 11/13/2020        If you are on prescription narcotic pain medication to control your pain you may also take that medication the morning of surgery.    General Instructions:  • Do not eat solid food after midnight the night before surgery.  • You may drink clear liquids day of surgery but must stop at least one hour before your hospital arrival time.  • It is beneficial for you to have a clear drink that contains carbohydrates the day of surgery.  We suggest a 12 to 20 ounce bottle of Gatorade or Powerade for non-diabetic patients or a 12 to 20 ounce bottle of G2 or Powerade Zero for diabetic patients. (Pediatric patients, are not advised to drink a 12 to 20 ounce carbohydrate drink)    Clear liquids are liquids you can see through.  Nothing red in color.     Plain water                               Sports drinks  Sodas                                   Gelatin (Jell-O)  Fruit juices without pulp such as white grape juice and apple juice  Popsicles that contain no fruit or yogurt  Tea or coffee (no cream or milk added)  Gatorade / Powerade  G2 / Powerade Zero    • Infants may have breast milk up to four hours before surgery.  • Infants drinking formula may drink formula up to six hours before surgery.   • Patients who avoid smoking, chewing tobacco and alcohol for 4 weeks prior to surgery have a reduced risk of post-operative complications.  Quit smoking as many days before surgery as you can.  • Do not smoke, use chewing tobacco or drink alcohol the day of surgery.   • If applicable bring your C-PAP/ BI-PAP machine.  • Bring any papers given to you in the doctor’s office.  • Wear clean comfortable clothes.  • Do not wear contact lenses, false eyelashes or make-up.  Bring a case for your glasses.   • Bring crutches or walker if applicable.  • Remove all piercings.  Leave jewelry and any other valuables at  home.  • Hair extensions with metal clips must be removed prior to surgery.  • The Pre-Admission Testing nurse will instruct you to bring medications if unable to obtain an accurate list in Pre-Admission Testing.            Preventing a Surgical Site Infection:  • For 2 to 3 days before surgery, avoid shaving with a razor because the razor can irritate skin and make it easier to develop an infection.    • Any areas of open skin can increase the risk of a post-operative wound infection by allowing bacteria to enter and travel throughout the body.  Notify your surgeon if you have any skin wounds / rashes even if it is not near the expected surgical site.  The area will need assessed to determine if surgery should be delayed until it is healed.  • The night prior to surgery shower using a fresh bar of anti-bacterial soap (such as Dial) and clean washcloth.  Sleep in a clean bed with clean clothing.  Do not allow pets to sleep with you.  • Shower on the morning of surgery using a fresh bar of anti-bacterial soap (such as Dial) and clean washcloth.  Dry with a clean towel and dress in clean clothing.  • Ask your surgeon if you will be receiving antibiotics prior to surgery.  • Make sure you, your family, and all healthcare providers clean their hands with soap and water or an alcohol based hand  before caring for you or your wound.    Day of surgery:  Your arrival time is approximately two hours before your scheduled surgery time.  Upon arrival, a Pre-op nurse and Anesthesiologist will review your health history, obtain vital signs, and answer questions you may have.  The only belongings needed at this time will be a list of your home medications and if applicable your C-PAP/BI-PAP machine.  If you are staying overnight your family can leave the rest of your belongings in the car and bring them to your room later.  A Pre-op nurse will start an IV and you may receive medication in preparation for surgery,  including something to help you relax.  While you are in surgery your family should notify the waiting room  if they leave the waiting room area and provide a contact phone number.    Please be aware that surgery does come with discomfort.  We want to make every effort to control your discomfort so please discuss any uncontrolled symptoms with your nurse.   Your doctor will most likely have prescribed pain medications.      If you are going home after surgery you will receive individualized written care instructions before being discharged.  A responsible adult must drive you to and from the hospital on the day of your surgery and stay with you for 24 hours.    If you are staying overnight following surgery, you will be transported to your hospital room following the recovery period.  [facility] has all private rooms.    If you have any questions please call Pre-Admission Testing at [phone].  Deductibles and co-payments are collected on the day of service. Please be prepared to pay the required co-pay, deductible or deposit on the day of service as defined by your plan.    Patient Education for Self-Quarantine Process    Following your COVID testing, we strongly recommend that you do not leave your home after you have been tested for COVID except to get medical care. This includes not going to work, school or to public areas.  If this is not possible for you to do please limit your activities to only required outings.  Be sure to wear a mask when you are with other people, practice social distancing and wash your hands frequently.      The following items provide additional details to keep you safe.  • Wash your hands with soap and water frequently for at least 20 seconds.   • Avoid touching your eyes, nose and mouth with unwashed hands.  • Do not share anything - utensils, towels, food from the same bowl.   • Have your own utensils, drinking glass, dishes, towels and bedding.   • Do  not have visitors.   • Do use FaceTime to stay in touch with family and friends.  • You should stay in a specific room away from others if possible.   • Stay at least 6 feet away from others in the home if you cannot have a dedicated room to yourself.   • Do not snuggle with your pet. While the CDC says there is no evidence that pets can spread COVID-19 or be infected from humans, it is probably best to avoid “petting, snuggling, being kissed or licked and sharing food (during self-quarantine)”, according to the CDC.   • Sanitize household surfaces daily. Include all high touch areas (door handles, light switches, phones, countertops, etc.)  • Do not share a bathroom with others, if possible.   • Wear a mask around others in your home if you are unable to stay in a separate room or 6 feet apart. If  you are unable to wear a mask, have your family member wear a mask if they must be within 6 feet of you.   Call your surgeon immediately if you experience any of the following symptoms:  • Sore Throat  • Shortness of Breath or difficulty breathing  • Cough  • Chills  • Body soreness or muscle pain  • Headache  • Fever  • New loss of taste or smell  • Do not arrive for your surgery ill.  Your procedure will need to be rescheduled to another time.  You will need to call your physician before the day of surgery to avoid any unnecessary exposure to hospital staff as well as other patients.

## 2020-11-11 ENCOUNTER — LAB (OUTPATIENT)
Dept: LAB | Facility: HOSPITAL | Age: 45
End: 2020-11-11

## 2020-11-11 DIAGNOSIS — Z01.818 OTHER SPECIFIED PRE-OPERATIVE EXAMINATION: ICD-10-CM

## 2020-11-12 LAB
COTININE SERPL-MCNC: 212.6 NG/ML
NICOTINE SERPL-MCNC: 10.4 NG/ML

## 2021-01-18 ENCOUNTER — APPOINTMENT (OUTPATIENT)
Dept: PREADMISSION TESTING | Facility: HOSPITAL | Age: 46
End: 2021-01-18

## 2021-01-18 VITALS
HEIGHT: 65 IN | BODY MASS INDEX: 27.74 KG/M2 | DIASTOLIC BLOOD PRESSURE: 81 MMHG | OXYGEN SATURATION: 100 % | WEIGHT: 166.5 LBS | HEART RATE: 91 BPM | SYSTOLIC BLOOD PRESSURE: 132 MMHG | RESPIRATION RATE: 20 BRPM | TEMPERATURE: 97.8 F

## 2021-01-18 LAB
ANION GAP SERPL CALCULATED.3IONS-SCNC: 9 MMOL/L (ref 5–15)
BUN SERPL-MCNC: 15 MG/DL (ref 6–20)
BUN/CREAT SERPL: 17.4 (ref 7–25)
CALCIUM SPEC-SCNC: 9.1 MG/DL (ref 8.6–10.5)
CHLORIDE SERPL-SCNC: 105 MMOL/L (ref 98–107)
CO2 SERPL-SCNC: 24 MMOL/L (ref 22–29)
CREAT SERPL-MCNC: 0.86 MG/DL (ref 0.57–1)
DEPRECATED RDW RBC AUTO: 49.8 FL (ref 37–54)
ERYTHROCYTE [DISTWIDTH] IN BLOOD BY AUTOMATED COUNT: 13.6 % (ref 12.3–15.4)
GFR SERPL CREATININE-BSD FRML MDRD: 71 ML/MIN/1.73
GLUCOSE SERPL-MCNC: 72 MG/DL (ref 65–99)
HCT VFR BLD AUTO: 41.6 % (ref 34–46.6)
HGB BLD-MCNC: 14.3 G/DL (ref 12–15.9)
MCH RBC QN AUTO: 34.8 PG (ref 26.6–33)
MCHC RBC AUTO-ENTMCNC: 34.4 G/DL (ref 31.5–35.7)
MCV RBC AUTO: 101.2 FL (ref 79–97)
PLATELET # BLD AUTO: 192 10*3/MM3 (ref 140–450)
PMV BLD AUTO: 9.8 FL (ref 6–12)
POTASSIUM SERPL-SCNC: 4 MMOL/L (ref 3.5–5.2)
RBC # BLD AUTO: 4.11 10*6/MM3 (ref 3.77–5.28)
SODIUM SERPL-SCNC: 138 MMOL/L (ref 136–145)
WBC # BLD AUTO: 7.59 10*3/MM3 (ref 3.4–10.8)

## 2021-01-18 PROCEDURE — U0004 COV-19 TEST NON-CDC HGH THRU: HCPCS | Performed by: NURSE PRACTITIONER

## 2021-01-18 PROCEDURE — 80048 BASIC METABOLIC PNL TOTAL CA: CPT

## 2021-01-18 PROCEDURE — C9803 HOPD COVID-19 SPEC COLLECT: HCPCS | Performed by: NURSE PRACTITIONER

## 2021-01-18 PROCEDURE — 85027 COMPLETE CBC AUTOMATED: CPT

## 2021-01-18 PROCEDURE — 36415 COLL VENOUS BLD VENIPUNCTURE: CPT

## 2021-01-18 RX ORDER — DIAZEPAM 2 MG/1
5 TABLET ORAL 2 TIMES DAILY PRN
COMMUNITY
End: 2022-07-26 | Stop reason: ALTCHOICE

## 2021-01-18 RX ORDER — DESVENLAFAXINE SUCCINATE 50 MG/1
50 TABLET, EXTENDED RELEASE ORAL NIGHTLY
COMMUNITY

## 2021-01-18 RX ORDER — DOCOSANOL 100 MG/G
1 CREAM TOPICAL AS NEEDED
COMMUNITY

## 2021-01-18 NOTE — DISCHARGE INSTRUCTIONS
Take the following medications the morning of surgery:    pristiq   Valium  If needed      If you are on prescription narcotic pain medication to control your pain you may also take that medication the morning of surgery.    General Instructions:  • Do not eat solid food after midnight the night before surgery.  • You may drink clear liquids day of surgery but must stop at least one hour before your hospital arrival time.  • It is beneficial for you to have a clear drink that contains carbohydrates the day of surgery.  We suggest a 12 to 20 ounce bottle of Gatorade or Powerade for non-diabetic patients or a 12 to 20 ounce bottle of G2 or Powerade Zero for diabetic patients. (Pediatric patients, are not advised to drink a 12 to 20 ounce carbohydrate drink)    Clear liquids are liquids you can see through.  Nothing red in color.     Plain water                               Sports drinks  Sodas                                   Gelatin (Jell-O)  Fruit juices without pulp such as white grape juice and apple juice  Popsicles that contain no fruit or yogurt  Tea or coffee (no cream or milk added)  Gatorade / Powerade  G2 / Powerade Zero    • Infants may have breast milk up to four hours before surgery.  • Infants drinking formula may drink formula up to six hours before surgery.   • Patients who avoid smoking, chewing tobacco and alcohol for 4 weeks prior to surgery have a reduced risk of post-operative complications.  Quit smoking as many days before surgery as you can.  • Do not smoke, use chewing tobacco or drink alcohol the day of surgery.   • If applicable bring your C-PAP/ BI-PAP machine.  • Bring any papers given to you in the doctor’s office.  • Wear clean comfortable clothes.  • Do not wear contact lenses, false eyelashes or make-up.  Bring a case for your glasses.   • Bring crutches or walker if applicable.  • Remove all piercings.  Leave jewelry and any other valuables at home.  • Hair extensions with metal  clips must be removed prior to surgery.  • The Pre-Admission Testing nurse will instruct you to bring medications if unable to obtain an accurate list in Pre-Admission Testing.        If you were given a blood bank ID arm band remember to bring it with you the day of surgery.    Preventing a Surgical Site Infection:  • For 2 to 3 days before surgery, avoid shaving with a razor because the razor can irritate skin and make it easier to develop an infection.    • Any areas of open skin can increase the risk of a post-operative wound infection by allowing bacteria to enter and travel throughout the body.  Notify your surgeon if you have any skin wounds / rashes even if it is not near the expected surgical site.  The area will need assessed to determine if surgery should be delayed until it is healed.  • The night prior to surgery shower using a fresh bar of anti-bacterial soap (such as Dial) and clean washcloth.  Sleep in a clean bed with clean clothing.  Do not allow pets to sleep with you.  • Shower on the morning of surgery using a fresh bar of anti-bacterial soap (such as Dial) and clean washcloth.  Dry with a clean towel and dress in clean clothing.  • Ask your surgeon if you will be receiving antibiotics prior to surgery.  • Make sure you, your family, and all healthcare providers clean their hands with soap and water or an alcohol based hand  before caring for you or your wound.    Day of surgery:1/20/2021   1 pm  Your arrival time is approximately two hours before your scheduled surgery time.  Upon arrival, a Pre-op nurse and Anesthesiologist will review your health history, obtain vital signs, and answer questions you may have.  The only belongings needed at this time will be a list of your home medications and if applicable your C-PAP/BI-PAP machine.  A Pre-op nurse will start an IV and you may receive medication in preparation for surgery, including something to help you relax.     Please be aware  that surgery does come with discomfort.  We want to make every effort to control your discomfort so please discuss any uncontrolled symptoms with your nurse.   Your doctor will most likely have prescribed pain medications.      If you are going home after surgery you will receive individualized written care instructions before being discharged.  A responsible adult must drive you to and from the hospital on the day of your surgery and stay with you for 24 hours.  Discharge prescriptions can be filled by the hospital pharmacy during regular pharmacy hours.  If you are having surgery late in the day/evening your prescription may be e-prescribed to your pharmacy.  Please verify your pharmacy hours or chose a 24 hour pharmacy to avoid not having access to your prescription because your pharmacy has closed for the day.    If you are staying overnight following surgery, you will be transported to your hospital room following the recovery period.  [facility] has all private rooms.    If you have any questions please call Pre-Admission Testing at [phone].  Deductibles and co-payments are collected on the day of service. Please be prepared to pay the required co-pay, deductible or deposit on the day of service as defined by your plan.    Patient Education for Self-Quarantine Process    Following your COVID testing, we strongly recommend that you do not leave your home after you have been tested for COVID except to get medical care. This includes not going to work, school or to public areas.  If this is not possible for you to do please limit your activities to only required outings.  Be sure to wear a mask when you are with other people, practice social distancing and wash your hands frequently.      The following items provide additional details to keep you safe.  • Wash your hands with soap and water frequently for at least 20 seconds.   • Avoid touching your eyes, nose and mouth with unwashed  hands.  • Do not share anything - utensils, towels, food from the same bowl.   • Have your own utensils, drinking glass, dishes, towels and bedding.   • Do not have visitors.   • Do use FaceTime to stay in touch with family and friends.  • You should stay in a specific room away from others if possible.   • Stay at least 6 feet away from others in the home if you cannot have a dedicated room to yourself.   • Do not snuggle with your pet. While the CDC says there is no evidence that pets can spread COVID-19 or be infected from humans, it is probably best to avoid “petting, snuggling, being kissed or licked and sharing food (during self-quarantine)”, according to the CDC.   • Sanitize household surfaces daily. Include all high touch areas (door handles, light switches, phones, countertops, etc.)  • Do not share a bathroom with others, if possible.   • Wear a mask around others in your home if you are unable to stay in a separate room or 6 feet apart. If  you are unable to wear a mask, have your family member wear a mask if they must be within 6 feet of you.   Call your surgeon immediately if you experience any of the following symptoms:  • Sore Throat  • Shortness of Breath or difficulty breathing  • Cough  • Chills  • Body soreness or muscle pain  • Headache  • Fever  • New loss of taste or smell  • Do not arrive for your surgery ill.  Your procedure will need to be rescheduled to another time.  You will need to call your physician before the day of surgery to avoid any unnecessary exposure to hospital staff as well as other patients.

## 2021-01-19 LAB — SARS-COV-2 RNA RESP QL NAA+PROBE: NOT DETECTED

## 2021-01-20 ENCOUNTER — ANESTHESIA EVENT (OUTPATIENT)
Dept: PERIOP | Facility: HOSPITAL | Age: 46
End: 2021-01-20

## 2021-01-20 ENCOUNTER — ANESTHESIA (OUTPATIENT)
Dept: PERIOP | Facility: HOSPITAL | Age: 46
End: 2021-01-20

## 2021-01-20 ENCOUNTER — HOSPITAL ENCOUNTER (OUTPATIENT)
Facility: HOSPITAL | Age: 46
Discharge: HOME | End: 2021-01-21
Attending: PLASTIC SURGERY | Admitting: PLASTIC SURGERY

## 2021-01-20 DIAGNOSIS — Z90.12 ACQUIRED ABSENCE OF LEFT BREAST: ICD-10-CM

## 2021-01-20 PROCEDURE — 25010000002 ONDANSETRON PER 1 MG: Performed by: NURSE ANESTHETIST, CERTIFIED REGISTERED

## 2021-01-20 PROCEDURE — 25010000002 ONDANSETRON PER 1 MG: Performed by: ANESTHESIOLOGY

## 2021-01-20 PROCEDURE — 25010000002 FENTANYL CITRATE (PF) 100 MCG/2ML SOLUTION: Performed by: ANESTHESIOLOGY

## 2021-01-20 PROCEDURE — 25010000002 HYDROMORPHONE PER 4 MG: Performed by: ANESTHESIOLOGY

## 2021-01-20 PROCEDURE — 25010000002 MIDAZOLAM PER 1 MG: Performed by: ANESTHESIOLOGY

## 2021-01-20 PROCEDURE — 25010000002 VANCOMYCIN 10 G RECONSTITUTED SOLUTION: Performed by: PLASTIC SURGERY

## 2021-01-20 PROCEDURE — 25010000002 MORPHINE PER 10 MG: Performed by: PLASTIC SURGERY

## 2021-01-20 PROCEDURE — 88305 TISSUE EXAM BY PATHOLOGIST: CPT | Performed by: PLASTIC SURGERY

## 2021-01-20 PROCEDURE — 25010000002 NEOSTIGMINE PER 0.5 MG: Performed by: NURSE ANESTHETIST, CERTIFIED REGISTERED

## 2021-01-20 PROCEDURE — 25010000002 PROPOFOL 10 MG/ML EMULSION: Performed by: ANESTHESIOLOGY

## 2021-01-20 PROCEDURE — 25010000002 ONDANSETRON PER 1 MG: Performed by: PLASTIC SURGERY

## 2021-01-20 PROCEDURE — 25010000002 DEXAMETHASONE PER 1 MG: Performed by: ANESTHESIOLOGY

## 2021-01-20 PROCEDURE — C1789 PROSTHESIS, BREAST, IMP: HCPCS | Performed by: PLASTIC SURGERY

## 2021-01-20 DEVICE — ALLOX2 TISSUE EXPANDER LOW POLE PLUS-MID HEIGHT PROFILE, SMOOTH SURFACE, W/ SUTURE TABS
Type: IMPLANTABLE DEVICE | Site: BREAST | Status: FUNCTIONAL
Brand: ALLOX2 TISSUE EXPANDER

## 2021-01-20 RX ORDER — FLUMAZENIL 0.1 MG/ML
0.2 INJECTION INTRAVENOUS AS NEEDED
Status: DISCONTINUED | OUTPATIENT
Start: 2021-01-20 | End: 2021-01-20 | Stop reason: HOSPADM

## 2021-01-20 RX ORDER — MORPHINE SULFATE 2 MG/ML
4 INJECTION, SOLUTION INTRAMUSCULAR; INTRAVENOUS
Status: DISCONTINUED | OUTPATIENT
Start: 2021-01-20 | End: 2021-01-21 | Stop reason: HOSPADM

## 2021-01-20 RX ORDER — DIPHENHYDRAMINE HYDROCHLORIDE 50 MG/ML
12.5 INJECTION INTRAMUSCULAR; INTRAVENOUS
Status: DISCONTINUED | OUTPATIENT
Start: 2021-01-20 | End: 2021-01-20 | Stop reason: HOSPADM

## 2021-01-20 RX ORDER — PROMETHAZINE HYDROCHLORIDE 25 MG/1
25 TABLET ORAL EVERY 8 HOURS PRN
Status: DISCONTINUED | OUTPATIENT
Start: 2021-01-20 | End: 2021-01-20 | Stop reason: SDUPTHER

## 2021-01-20 RX ORDER — DIAZEPAM 2 MG/1
2 TABLET ORAL 2 TIMES DAILY PRN
Status: DISCONTINUED | OUTPATIENT
Start: 2021-01-20 | End: 2021-01-21 | Stop reason: HOSPADM

## 2021-01-20 RX ORDER — NALOXONE HCL 0.4 MG/ML
0.2 VIAL (ML) INJECTION AS NEEDED
Status: DISCONTINUED | OUTPATIENT
Start: 2021-01-20 | End: 2021-01-20 | Stop reason: HOSPADM

## 2021-01-20 RX ORDER — IPRATROPIUM BROMIDE AND ALBUTEROL SULFATE 2.5; .5 MG/3ML; MG/3ML
3 SOLUTION RESPIRATORY (INHALATION) EVERY 4 HOURS PRN
Status: DISCONTINUED | OUTPATIENT
Start: 2021-01-20 | End: 2021-01-21 | Stop reason: HOSPADM

## 2021-01-20 RX ORDER — HYDROCODONE BITARTRATE AND ACETAMINOPHEN 5; 325 MG/1; MG/1
1 TABLET ORAL EVERY 6 HOURS PRN
Status: DISCONTINUED | OUTPATIENT
Start: 2021-01-20 | End: 2021-01-20 | Stop reason: SDUPTHER

## 2021-01-20 RX ORDER — FENTANYL CITRATE 50 UG/ML
50 INJECTION, SOLUTION INTRAMUSCULAR; INTRAVENOUS
Status: DISCONTINUED | OUTPATIENT
Start: 2021-01-20 | End: 2021-01-20 | Stop reason: HOSPADM

## 2021-01-20 RX ORDER — SODIUM CHLORIDE, SODIUM LACTATE, POTASSIUM CHLORIDE, CALCIUM CHLORIDE 600; 310; 30; 20 MG/100ML; MG/100ML; MG/100ML; MG/100ML
75 INJECTION, SOLUTION INTRAVENOUS CONTINUOUS
Status: DISCONTINUED | OUTPATIENT
Start: 2021-01-20 | End: 2021-01-21 | Stop reason: HOSPADM

## 2021-01-20 RX ORDER — FAMOTIDINE 10 MG/ML
20 INJECTION, SOLUTION INTRAVENOUS ONCE
Status: COMPLETED | OUTPATIENT
Start: 2021-01-20 | End: 2021-01-20

## 2021-01-20 RX ORDER — TEMAZEPAM 7.5 MG/1
15 CAPSULE ORAL NIGHTLY PRN
Status: DISCONTINUED | OUTPATIENT
Start: 2021-01-20 | End: 2021-01-21 | Stop reason: HOSPADM

## 2021-01-20 RX ORDER — OXYCODONE AND ACETAMINOPHEN 7.5; 325 MG/1; MG/1
1 TABLET ORAL EVERY 4 HOURS PRN
Status: DISCONTINUED | OUTPATIENT
Start: 2021-01-20 | End: 2021-01-21 | Stop reason: HOSPADM

## 2021-01-20 RX ORDER — ONDANSETRON 4 MG/1
4 TABLET, FILM COATED ORAL EVERY 6 HOURS PRN
Status: DISCONTINUED | OUTPATIENT
Start: 2021-01-20 | End: 2021-01-21 | Stop reason: HOSPADM

## 2021-01-20 RX ORDER — GLYCOPYRROLATE 0.2 MG/ML
INJECTION INTRAMUSCULAR; INTRAVENOUS AS NEEDED
Status: DISCONTINUED | OUTPATIENT
Start: 2021-01-20 | End: 2021-01-20 | Stop reason: SURG

## 2021-01-20 RX ORDER — VANCOMYCIN HYDROCHLORIDE 1 G/200ML
1 INJECTION, SOLUTION INTRAVENOUS ONCE
Status: DISCONTINUED | OUTPATIENT
Start: 2021-01-20 | End: 2021-01-20 | Stop reason: ALTCHOICE

## 2021-01-20 RX ORDER — PROMETHAZINE HYDROCHLORIDE 25 MG/1
25 SUPPOSITORY RECTAL ONCE AS NEEDED
Status: DISCONTINUED | OUTPATIENT
Start: 2021-01-20 | End: 2021-01-20 | Stop reason: HOSPADM

## 2021-01-20 RX ORDER — SODIUM CHLORIDE, SODIUM LACTATE, POTASSIUM CHLORIDE, CALCIUM CHLORIDE 600; 310; 30; 20 MG/100ML; MG/100ML; MG/100ML; MG/100ML
9 INJECTION, SOLUTION INTRAVENOUS CONTINUOUS
Status: DISCONTINUED | OUTPATIENT
Start: 2021-01-20 | End: 2021-01-21 | Stop reason: HOSPADM

## 2021-01-20 RX ORDER — MELOXICAM 15 MG/1
15 TABLET ORAL DAILY
Status: DISCONTINUED | OUTPATIENT
Start: 2021-01-20 | End: 2021-01-21 | Stop reason: HOSPADM

## 2021-01-20 RX ORDER — OXYCODONE AND ACETAMINOPHEN 7.5; 325 MG/1; MG/1
1 TABLET ORAL ONCE AS NEEDED
Status: DISCONTINUED | OUTPATIENT
Start: 2021-01-20 | End: 2021-01-20 | Stop reason: HOSPADM

## 2021-01-20 RX ORDER — PROMETHAZINE HYDROCHLORIDE 25 MG/1
25 TABLET ORAL EVERY 6 HOURS PRN
Status: DISCONTINUED | OUTPATIENT
Start: 2021-01-20 | End: 2021-01-21 | Stop reason: HOSPADM

## 2021-01-20 RX ORDER — FENTANYL CITRATE 50 UG/ML
INJECTION, SOLUTION INTRAMUSCULAR; INTRAVENOUS AS NEEDED
Status: DISCONTINUED | OUTPATIENT
Start: 2021-01-20 | End: 2021-01-20 | Stop reason: SURG

## 2021-01-20 RX ORDER — HYDROCODONE BITARTRATE AND ACETAMINOPHEN 7.5; 325 MG/1; MG/1
1 TABLET ORAL ONCE AS NEEDED
Status: DISCONTINUED | OUTPATIENT
Start: 2021-01-20 | End: 2021-01-20 | Stop reason: HOSPADM

## 2021-01-20 RX ORDER — PROMETHAZINE HYDROCHLORIDE 25 MG/1
25 TABLET ORAL ONCE AS NEEDED
Status: DISCONTINUED | OUTPATIENT
Start: 2021-01-20 | End: 2021-01-20 | Stop reason: HOSPADM

## 2021-01-20 RX ORDER — HYDROCODONE BITARTRATE AND ACETAMINOPHEN 5; 325 MG/1; MG/1
1 TABLET ORAL EVERY 4 HOURS PRN
Status: DISCONTINUED | OUTPATIENT
Start: 2021-01-20 | End: 2021-01-21 | Stop reason: HOSPADM

## 2021-01-20 RX ORDER — LABETALOL HYDROCHLORIDE 5 MG/ML
5 INJECTION, SOLUTION INTRAVENOUS
Status: DISCONTINUED | OUTPATIENT
Start: 2021-01-20 | End: 2021-01-20 | Stop reason: HOSPADM

## 2021-01-20 RX ORDER — PROPOFOL 10 MG/ML
VIAL (ML) INTRAVENOUS AS NEEDED
Status: DISCONTINUED | OUTPATIENT
Start: 2021-01-20 | End: 2021-01-20 | Stop reason: SURG

## 2021-01-20 RX ORDER — LIDOCAINE HYDROCHLORIDE 20 MG/ML
INJECTION, SOLUTION INFILTRATION; PERINEURAL AS NEEDED
Status: DISCONTINUED | OUTPATIENT
Start: 2021-01-20 | End: 2021-01-20 | Stop reason: SURG

## 2021-01-20 RX ORDER — HYDROMORPHONE HYDROCHLORIDE 1 MG/ML
0.5 INJECTION, SOLUTION INTRAMUSCULAR; INTRAVENOUS; SUBCUTANEOUS
Status: DISCONTINUED | OUTPATIENT
Start: 2021-01-20 | End: 2021-01-20 | Stop reason: HOSPADM

## 2021-01-20 RX ORDER — ROCURONIUM BROMIDE 10 MG/ML
INJECTION, SOLUTION INTRAVENOUS AS NEEDED
Status: DISCONTINUED | OUTPATIENT
Start: 2021-01-20 | End: 2021-01-20 | Stop reason: SURG

## 2021-01-20 RX ORDER — ANASTROZOLE 1 MG/1
1 TABLET ORAL DAILY
Status: DISCONTINUED | OUTPATIENT
Start: 2021-01-20 | End: 2021-01-21 | Stop reason: HOSPADM

## 2021-01-20 RX ORDER — DESVENLAFAXINE SUCCINATE 50 MG/1
50 TABLET, EXTENDED RELEASE ORAL NIGHTLY
Status: DISCONTINUED | OUTPATIENT
Start: 2021-01-20 | End: 2021-01-21 | Stop reason: HOSPADM

## 2021-01-20 RX ORDER — ONDANSETRON 2 MG/ML
4 INJECTION INTRAMUSCULAR; INTRAVENOUS EVERY 6 HOURS PRN
Status: DISCONTINUED | OUTPATIENT
Start: 2021-01-20 | End: 2021-01-21 | Stop reason: HOSPADM

## 2021-01-20 RX ORDER — ONDANSETRON 2 MG/ML
4 INJECTION INTRAMUSCULAR; INTRAVENOUS ONCE AS NEEDED
Status: COMPLETED | OUTPATIENT
Start: 2021-01-20 | End: 2021-01-20

## 2021-01-20 RX ORDER — SODIUM CHLORIDE 0.9 % (FLUSH) 0.9 %
3 SYRINGE (ML) INJECTION EVERY 12 HOURS SCHEDULED
Status: DISCONTINUED | OUTPATIENT
Start: 2021-01-20 | End: 2021-01-20 | Stop reason: HOSPADM

## 2021-01-20 RX ORDER — BUPIVACAINE HYDROCHLORIDE 2.5 MG/ML
INJECTION, SOLUTION EPIDURAL; INFILTRATION; INTRACAUDAL AS NEEDED
Status: DISCONTINUED | OUTPATIENT
Start: 2021-01-20 | End: 2021-01-20 | Stop reason: HOSPADM

## 2021-01-20 RX ORDER — DIPHENHYDRAMINE HCL 25 MG
25 CAPSULE ORAL
Status: DISCONTINUED | OUTPATIENT
Start: 2021-01-20 | End: 2021-01-20 | Stop reason: HOSPADM

## 2021-01-20 RX ORDER — DEXAMETHASONE SODIUM PHOSPHATE 10 MG/ML
INJECTION INTRAMUSCULAR; INTRAVENOUS AS NEEDED
Status: DISCONTINUED | OUTPATIENT
Start: 2021-01-20 | End: 2021-01-20 | Stop reason: SURG

## 2021-01-20 RX ORDER — ONDANSETRON 2 MG/ML
INJECTION INTRAMUSCULAR; INTRAVENOUS AS NEEDED
Status: DISCONTINUED | OUTPATIENT
Start: 2021-01-20 | End: 2021-01-20 | Stop reason: SURG

## 2021-01-20 RX ORDER — NALOXONE HCL 0.4 MG/ML
0.4 VIAL (ML) INJECTION
Status: DISCONTINUED | OUTPATIENT
Start: 2021-01-20 | End: 2021-01-21 | Stop reason: HOSPADM

## 2021-01-20 RX ORDER — LIDOCAINE HYDROCHLORIDE 10 MG/ML
0.5 INJECTION, SOLUTION EPIDURAL; INFILTRATION; INTRACAUDAL; PERINEURAL ONCE AS NEEDED
Status: DISCONTINUED | OUTPATIENT
Start: 2021-01-20 | End: 2021-01-20 | Stop reason: HOSPADM

## 2021-01-20 RX ORDER — EPHEDRINE SULFATE 50 MG/ML
5 INJECTION, SOLUTION INTRAVENOUS ONCE AS NEEDED
Status: DISCONTINUED | OUTPATIENT
Start: 2021-01-20 | End: 2021-01-20 | Stop reason: HOSPADM

## 2021-01-20 RX ORDER — SODIUM CHLORIDE 0.9 % (FLUSH) 0.9 %
3-10 SYRINGE (ML) INJECTION AS NEEDED
Status: DISCONTINUED | OUTPATIENT
Start: 2021-01-20 | End: 2021-01-20 | Stop reason: HOSPADM

## 2021-01-20 RX ORDER — MIDAZOLAM HYDROCHLORIDE 1 MG/ML
1 INJECTION INTRAMUSCULAR; INTRAVENOUS
Status: COMPLETED | OUTPATIENT
Start: 2021-01-20 | End: 2021-01-20

## 2021-01-20 RX ADMIN — DIAZEPAM 2 MG: 2 TABLET ORAL at 22:25

## 2021-01-20 RX ADMIN — ONDANSETRON 4 MG: 2 INJECTION INTRAMUSCULAR; INTRAVENOUS at 22:35

## 2021-01-20 RX ADMIN — MIDAZOLAM 1 MG: 1 INJECTION INTRAMUSCULAR; INTRAVENOUS at 14:48

## 2021-01-20 RX ADMIN — FENTANYL CITRATE 50 MCG: 50 INJECTION, SOLUTION INTRAMUSCULAR; INTRAVENOUS at 17:30

## 2021-01-20 RX ADMIN — MORPHINE SULFATE 4 MG: 2 INJECTION, SOLUTION INTRAMUSCULAR; INTRAVENOUS at 21:20

## 2021-01-20 RX ADMIN — HYDROCODONE BITARTRATE AND ACETAMINOPHEN 1 TABLET: 5; 325 TABLET ORAL at 22:25

## 2021-01-20 RX ADMIN — ROCURONIUM BROMIDE 35 MG: 10 INJECTION INTRAVENOUS at 15:22

## 2021-01-20 RX ADMIN — FAMOTIDINE 20 MG: 10 INJECTION, SOLUTION INTRAVENOUS at 14:24

## 2021-01-20 RX ADMIN — FENTANYL CITRATE 50 MCG: 50 INJECTION, SOLUTION INTRAMUSCULAR; INTRAVENOUS at 17:47

## 2021-01-20 RX ADMIN — LIDOCAINE HYDROCHLORIDE 80 MG: 20 INJECTION, SOLUTION INFILTRATION; PERINEURAL at 15:22

## 2021-01-20 RX ADMIN — HYDROMORPHONE HYDROCHLORIDE 0.5 MG: 1 INJECTION, SOLUTION INTRAMUSCULAR; INTRAVENOUS; SUBCUTANEOUS at 19:02

## 2021-01-20 RX ADMIN — DEXAMETHASONE SODIUM PHOSPHATE 6 MG: 10 INJECTION INTRAMUSCULAR; INTRAVENOUS at 15:35

## 2021-01-20 RX ADMIN — ONDANSETRON HYDROCHLORIDE 4 MG: 2 SOLUTION INTRAMUSCULAR; INTRAVENOUS at 16:51

## 2021-01-20 RX ADMIN — FENTANYL CITRATE 50 MCG: 50 INJECTION INTRAMUSCULAR; INTRAVENOUS at 15:50

## 2021-01-20 RX ADMIN — FENTANYL CITRATE 50 MCG: 50 INJECTION INTRAMUSCULAR; INTRAVENOUS at 17:14

## 2021-01-20 RX ADMIN — GLYCOPYRROLATE 0.2 MG: 0.2 INJECTION INTRAMUSCULAR; INTRAVENOUS at 16:57

## 2021-01-20 RX ADMIN — DESVENLAFAXINE SUCCINATE 50 MG: 50 TABLET, EXTENDED RELEASE ORAL at 23:48

## 2021-01-20 RX ADMIN — NEOSTIGMINE METHYLSULFATE 2 MG: 1 INJECTION INTRAMUSCULAR; INTRAVENOUS; SUBCUTANEOUS at 16:57

## 2021-01-20 RX ADMIN — FENTANYL CITRATE 50 MCG: 50 INJECTION INTRAMUSCULAR; INTRAVENOUS at 17:10

## 2021-01-20 RX ADMIN — VANCOMYCIN HYDROCHLORIDE 1250 MG: 10 INJECTION, POWDER, LYOPHILIZED, FOR SOLUTION INTRAVENOUS at 14:39

## 2021-01-20 RX ADMIN — MORPHINE SULFATE 4 MG: 2 INJECTION, SOLUTION INTRAMUSCULAR; INTRAVENOUS at 23:48

## 2021-01-20 RX ADMIN — MIDAZOLAM 1 MG: 1 INJECTION INTRAMUSCULAR; INTRAVENOUS at 14:23

## 2021-01-20 RX ADMIN — HYDROMORPHONE HYDROCHLORIDE 0.5 MG: 1 INJECTION, SOLUTION INTRAMUSCULAR; INTRAVENOUS; SUBCUTANEOUS at 17:38

## 2021-01-20 RX ADMIN — PROPOFOL 200 MG: 10 INJECTION, EMULSION INTRAVENOUS at 15:22

## 2021-01-20 RX ADMIN — SODIUM CHLORIDE, POTASSIUM CHLORIDE, SODIUM LACTATE AND CALCIUM CHLORIDE 9 ML/HR: 600; 310; 30; 20 INJECTION, SOLUTION INTRAVENOUS at 14:23

## 2021-01-20 RX ADMIN — FENTANYL CITRATE 50 MCG: 50 INJECTION INTRAMUSCULAR; INTRAVENOUS at 15:20

## 2021-01-20 RX ADMIN — ONDANSETRON 4 MG: 2 INJECTION INTRAMUSCULAR; INTRAVENOUS at 18:15

## 2021-01-20 RX ADMIN — HYDROMORPHONE HYDROCHLORIDE 0.5 MG: 1 INJECTION, SOLUTION INTRAMUSCULAR; INTRAVENOUS; SUBCUTANEOUS at 17:55

## 2021-01-20 NOTE — BRIEF OP NOTE
BREAST TISSUE EXPANDER INSERTION  Progress Note    Jina Huddleston  1/20/2021    Pre-op Diagnosis:   Personal history of breast CA  Acquired absence of breast       Post-Op Diagnosis Codes:  Personal history of breast CA  Acquired absence of breast  Seroma x3    Procedure/CPT® Codes:        Procedure(s):  DELAYED LEFT BREAST INSERTION OF TISSUE EXPANDER    Surgeon(s):  Radha Guevara MD    Anesthesia: General    Staff:   Circulator: Kyleigh Aldana RN  Scrub Person: Alessandra Ferreira Maurie         Estimated Blood Loss:25 cc    Urine Voided: * No values recorded between 1/20/2021  3:09 PM and 1/20/2021  5:15 PM *    Specimens:                Specimens     ID Source Type Tests Collected By Collected At Frozen?      A Breast, Left Tissue · TISSUE PATHOLOGY EXAM   Radha Guevara MD 1/20/21 1554 No     Description: LEFT BREAST CAPSULE                Drains:   Closed/Suction Drain 1 Left Breast Bulb 15 Fr. (Active)       [REMOVED] Closed/Suction Drain 1 Left Breast Bulb 15 Fr. (Removed)       [REMOVED] Closed/Suction Drain 2 Left Breast Bulb 15 Fr. (Removed)       [REMOVED] Closed/Suction Drain 3 Right Breast Bulb 15 Fr. (Removed)       [REMOVED] Closed/Suction Drain 4 Right Breast Bulb 15 Fr. (Removed)       [REMOVED] Closed/Suction Drain Left Breast 15 Fr. (Removed)       Findings: seroma x3  [id number] 0 fill    Complications: none          Radha Guevara MD     Date: 1/20/2021  Time: 17:15 EST

## 2021-01-20 NOTE — ANESTHESIA POSTPROCEDURE EVALUATION
"Patient: Jina FERNANDO Huddleston    Procedure Summary     Date: 01/20/21 Room / Location: [facility] OR  / [facility] MAIN OR    Anesthesia Start: 1515 Anesthesia Stop: 1723    Procedure: DELAYED LEFT BREAST INSERTION OF TISSUE EXPANDER (Left Breast) Diagnosis:     Surgeon: Radha Guevara MD Provider: Brad De Luna MD    Anesthesia Type: general ASA Status: 3          Anesthesia Type: general    Vitals  Vitals Value Taken Time   /82 01/20/21 1815   Temp 36.4 °C (97.6 °F) 01/20/21 1721   Pulse 83 01/20/21 1828   Resp 14 01/20/21 1815   SpO2 99 % 01/20/21 1828   Vitals shown include unvalidated device data.        Post Anesthesia Care and Evaluation    Patient location during evaluation: bedside  Patient participation: complete - patient participated  Level of consciousness: awake and alert  Pain management: adequate  Airway patency: patent  Anesthetic complications: No anesthetic complications    Cardiovascular status: acceptable  Respiratory status: acceptable  Hydration status: acceptable    Comments: /82   Pulse 87   Temp 36.4 °C (97.6 °F) (Oral)   Resp 14   Ht 163.8 cm (64.5\")   Wt 75.9 kg (167 lb 7 oz)   LMP  (LMP Unknown)   SpO2 97%   BMI 28.30 kg/m²       " color consistent with ethnicity/race

## 2021-01-20 NOTE — ANESTHESIA PREPROCEDURE EVALUATION
Anesthesia Evaluation                  Airway   Mallampati: I  TM distance: >3 FB  Neck ROM: full  No difficulty expected  Dental    (+) edentulous    Pulmonary - normal exam   (+) asthma,  Cardiovascular - normal exam        Neuro/Psych  (+) psychiatric history Bipolar,     GI/Hepatic/Renal/Endo    (+)  GERD,      Musculoskeletal     Abdominal    Substance History   (+) alcohol use, drug use      Comment: Recovering alcoholic and uses edibles plus past h/o crack cocaine   OB/GYN          Other      history of cancer                    Anesthesia Plan    ASA 3     general     intravenous induction     Anesthetic plan, all risks, benefits, and alternatives have been provided, discussed and informed consent has been obtained with: patient.

## 2021-01-20 NOTE — PERIOPERATIVE NURSING NOTE
Notified Dr. Guevara that pt's suicide screen was that she had attempted suicide in the 1990's.  She has been sober since 2004 and sees a therapist and takes medication.  Dr. Dao was ok with not placing the pt in suicide precautions.  I notified access center ( [facility]) and [place] in PACU.

## 2021-01-20 NOTE — ANESTHESIA PROCEDURE NOTES
Airway  Urgency: elective    Date/Time: 1/20/2021 3:24 PM    General Information and Staff    Patient location during procedure: OR  Anesthesiologist: Brad De Luna MD    Indications and Patient Condition    Preoxygenated: yes      Final Airway Details  Final airway type: endotracheal airway      Successful airway: ETT  Cuffed: yes   Successful intubation technique: direct laryngoscopy  Endotracheal tube insertion site: oral  Blade: Bebeto  Blade size: 3  ETT size (mm): 7.0  Placement verified by: chest auscultation   Number of attempts at approach: 1

## 2021-01-21 VITALS
SYSTOLIC BLOOD PRESSURE: 118 MMHG | DIASTOLIC BLOOD PRESSURE: 78 MMHG | TEMPERATURE: 97.7 F | HEART RATE: 101 BPM | HEIGHT: 65 IN | WEIGHT: 167.44 LBS | RESPIRATION RATE: 16 BRPM | BODY MASS INDEX: 27.9 KG/M2 | OXYGEN SATURATION: 98 %

## 2021-01-21 PROCEDURE — 25010000002 MORPHINE PER 10 MG: Performed by: PLASTIC SURGERY

## 2021-01-21 RX ORDER — HYDROCODONE BITARTRATE AND ACETAMINOPHEN 5; 325 MG/1; MG/1
1 TABLET ORAL ONCE AS NEEDED
Status: DISCONTINUED | OUTPATIENT
Start: 2021-01-21 | End: 2021-01-21 | Stop reason: HOSPADM

## 2021-01-21 RX ADMIN — MORPHINE SULFATE 4 MG: 2 INJECTION, SOLUTION INTRAMUSCULAR; INTRAVENOUS at 03:45

## 2021-01-21 RX ADMIN — OXYCODONE HYDROCHLORIDE AND ACETAMINOPHEN 1 TABLET: 7.5; 325 TABLET ORAL at 06:52

## 2021-01-21 RX ADMIN — OXYCODONE HYDROCHLORIDE AND ACETAMINOPHEN 1 TABLET: 7.5; 325 TABLET ORAL at 10:47

## 2021-01-21 RX ADMIN — HYDROCODONE BITARTRATE AND ACETAMINOPHEN 1 TABLET: 5; 325 TABLET ORAL at 02:14

## 2021-01-21 RX ADMIN — SODIUM CHLORIDE, POTASSIUM CHLORIDE, SODIUM LACTATE AND CALCIUM CHLORIDE 75 ML/HR: 600; 310; 30; 20 INJECTION, SOLUTION INTRAVENOUS at 03:45

## 2021-01-21 NOTE — PROGRESS NOTES
Continued Stay Note  [facility]     Patient Name: Jina Huddleston  MRN: [id number]  Today's Date: 1/21/2021    Admit Date: 1/20/2021    Discharge Plan     Row Name 01/21/21 0926       Plan    Plan  Home no needs    Plan Comments  Discharge order noted. Met with patient who confirmed DC plan is home. Stated family will assist as needed and her daugther will provide transportation. Denies any needs/equipment.        Discharge Codes    No documentation.       Expected Discharge Date and Time     Expected Discharge Date Expected Discharge Time    Jan 21, 2021             Bijal Rivera RN

## 2021-01-21 NOTE — PLAN OF CARE
"Goal Outcome Evaluation:  Plan of Care Reviewed With: patient  Progress: improving  Outcome Summary: Left chest with dsg dry and intcat with ABDs and ace wrap. Pt c/o \"tightness\" at left chest with expander. Medicated with morphine alternating with norco for pain and medicated x1 with valium. ambulated in halls with assist. Voiding without difficulty. Has been a little tachy with heartrate in low 100's.  "

## 2021-01-25 LAB
LAB AP CASE REPORT: NORMAL
PATH REPORT.FINAL DX SPEC: NORMAL
PATH REPORT.GROSS SPEC: NORMAL

## 2021-03-24 ENCOUNTER — HOSPITAL ENCOUNTER (OUTPATIENT)
Facility: HOSPITAL | Age: 46
Setting detail: SURGERY ADMIT
End: 2021-03-24
Attending: PLASTIC SURGERY | Admitting: PLASTIC SURGERY

## 2021-04-29 ENCOUNTER — TELEPHONE (OUTPATIENT)
Dept: SURGERY | Facility: CLINIC | Age: 46
End: 2021-04-29

## 2021-06-03 ENCOUNTER — APPOINTMENT (OUTPATIENT)
Dept: PREADMISSION TESTING | Facility: HOSPITAL | Age: 46
End: 2021-06-03

## 2021-06-07 ENCOUNTER — TELEPHONE (OUTPATIENT)
Dept: SURGERY | Facility: CLINIC | Age: 46
End: 2021-06-07

## 2021-06-07 NOTE — TELEPHONE ENCOUNTER
May 28, 2021 note from Dr. Joaquin Rivero radiation oncology: Patient presented to [facility] ER with shortness of breath.  She was Covid positive although the patient believes this is a false positive.  Her CT demonstrated some radiation fibrosis and patient presents for follow-up.  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  Breast cancer in 2020 was treated with bilateral skin sparing mastectomy and adjuvant anastrozole.  It is likely that this breast cancer represents a secondary malignancy from her initial radiation.  Related to her acute shortness of breath over the preceding 2 weeks I do not think that the radiation fibrosis in the apices of the lungs is related to this.  She can return to my clinic on an as-needed basis.

## 2021-07-27 ENCOUNTER — OFFICE VISIT (OUTPATIENT)
Dept: CARDIOLOGY | Facility: CLINIC | Age: 46
End: 2021-07-27

## 2021-07-27 ENCOUNTER — HOSPITAL ENCOUNTER (OUTPATIENT)
Dept: CARDIOLOGY | Facility: HOSPITAL | Age: 46
Discharge: HOME | End: 2021-07-27
Admitting: INTERNAL MEDICINE

## 2021-07-27 VITALS
WEIGHT: 164.6 LBS | BODY MASS INDEX: 27.42 KG/M2 | SYSTOLIC BLOOD PRESSURE: 122 MMHG | HEIGHT: 65 IN | DIASTOLIC BLOOD PRESSURE: 84 MMHG | HEART RATE: 100 BPM

## 2021-07-27 DIAGNOSIS — I38 VALVULAR HEART DISEASE: Primary | ICD-10-CM

## 2021-07-27 DIAGNOSIS — C50.212 CARCINOMA OF UPPER-INNER QUADRANT OF LEFT BREAST IN FEMALE, ESTROGEN RECEPTOR POSITIVE (HCC): ICD-10-CM

## 2021-07-27 DIAGNOSIS — I35.1 NONRHEUMATIC AORTIC VALVE INSUFFICIENCY: Chronic | ICD-10-CM

## 2021-07-27 DIAGNOSIS — Z92.3 HX OF RADIATION THERAPY: Chronic | ICD-10-CM

## 2021-07-27 DIAGNOSIS — I34.0 NONRHEUMATIC MITRAL VALVE REGURGITATION: Chronic | ICD-10-CM

## 2021-07-27 DIAGNOSIS — Z17.0 CARCINOMA OF UPPER-INNER QUADRANT OF LEFT BREAST IN FEMALE, ESTROGEN RECEPTOR POSITIVE (HCC): ICD-10-CM

## 2021-07-27 DIAGNOSIS — I34.0 NONRHEUMATIC MITRAL VALVE REGURGITATION: ICD-10-CM

## 2021-07-27 DIAGNOSIS — C81.90 HODGKIN LYMPHOMA, UNSPECIFIED HODGKIN LYMPHOMA TYPE, UNSPECIFIED BODY REGION (HCC): Chronic | ICD-10-CM

## 2021-07-27 DIAGNOSIS — I34.2 NONRHEUMATIC MITRAL VALVE STENOSIS: Chronic | ICD-10-CM

## 2021-07-27 DIAGNOSIS — Z92.21 HISTORY OF CHEMOTHERAPY: Chronic | ICD-10-CM

## 2021-07-27 PROCEDURE — 93356 MYOCRD STRAIN IMG SPCKL TRCK: CPT | Performed by: INTERNAL MEDICINE

## 2021-07-27 PROCEDURE — 25010000002 PERFLUTREN (DEFINITY) 8.476 MG IN SODIUM CHLORIDE (PF) 0.9 % 10 ML INJECTION: Performed by: INTERNAL MEDICINE

## 2021-07-27 PROCEDURE — 93356 MYOCRD STRAIN IMG SPCKL TRCK: CPT

## 2021-07-27 PROCEDURE — 93306 TTE W/DOPPLER COMPLETE: CPT | Performed by: INTERNAL MEDICINE

## 2021-07-27 PROCEDURE — 99214 OFFICE O/P EST MOD 30 MIN: CPT | Performed by: INTERNAL MEDICINE

## 2021-07-27 PROCEDURE — 93306 TTE W/DOPPLER COMPLETE: CPT

## 2021-07-27 RX ORDER — BUDESONIDE AND FORMOTEROL FUMARATE DIHYDRATE 160; 4.5 UG/1; UG/1
2 AEROSOL RESPIRATORY (INHALATION) 2 TIMES DAILY
COMMUNITY
Start: 2021-07-14

## 2021-07-27 RX ORDER — ASCORBIC ACID 100 MG
500 TABLET,CHEWABLE ORAL DAILY
COMMUNITY
Start: 2021-05-19

## 2021-07-27 RX ORDER — CETIRIZINE HYDROCHLORIDE 10 MG/1
10 TABLET ORAL DAILY
COMMUNITY
Start: 2021-06-10

## 2021-07-27 RX ORDER — FAMOTIDINE 20 MG/1
20 TABLET, FILM COATED ORAL DAILY
COMMUNITY
End: 2021-08-12 | Stop reason: HOSPADM

## 2021-07-27 RX ORDER — METOPROLOL SUCCINATE 25 MG/1
25 TABLET, EXTENDED RELEASE ORAL 2 TIMES DAILY
COMMUNITY
Start: 2021-05-25 | End: 2021-08-04 | Stop reason: SDUPTHER

## 2021-07-27 RX ORDER — ALBUTEROL SULFATE 1.25 MG/3ML
1 SOLUTION RESPIRATORY (INHALATION) EVERY 6 HOURS PRN
COMMUNITY

## 2021-07-27 RX ORDER — PANTOPRAZOLE SODIUM 40 MG/1
40 TABLET, DELAYED RELEASE ORAL 2 TIMES DAILY
COMMUNITY

## 2021-07-27 RX ORDER — FUROSEMIDE 20 MG/1
1 TABLET ORAL DAILY
COMMUNITY
Start: 2021-07-14 | End: 2021-08-04 | Stop reason: SDUPTHER

## 2021-07-27 RX ADMIN — PERFLUTREN 1.5 ML: 6.52 INJECTION, SUSPENSION INTRAVENOUS at 15:11

## 2021-07-28 LAB
AORTIC ARCH: 1.8 CM
ASCENDING AORTA: 2.9 CM
BH CV ECHO MEAS - ACS: 1.2 CM
BH CV ECHO MEAS - AI MAX PG: 113.4 MMHG
BH CV ECHO MEAS - AI MAX VEL: 532.4 CM/SEC
BH CV ECHO MEAS - AI P1/2T: 532 MSEC
BH CV ECHO MEAS - AO ARCH DIAM (PROXIMAL TRANS.): 1.8 CM
BH CV ECHO MEAS - AO MAX PG (FULL): 13.1 MMHG
BH CV ECHO MEAS - AO MAX PG: 15.6 MMHG
BH CV ECHO MEAS - AO MEAN PG (FULL): 6.6 MMHG
BH CV ECHO MEAS - AO MEAN PG: 8 MMHG
BH CV ECHO MEAS - AO ROOT AREA (BSA CORRECTED): 1.5
BH CV ECHO MEAS - AO ROOT AREA: 5.7 CM^2
BH CV ECHO MEAS - AO ROOT DIAM: 2.7 CM
BH CV ECHO MEAS - AO V2 MAX: 197.6 CM/SEC
BH CV ECHO MEAS - AO V2 MEAN: 133.4 CM/SEC
BH CV ECHO MEAS - AO V2 VTI: 31.7 CM
BH CV ECHO MEAS - ASC AORTA: 2.9 CM
BH CV ECHO MEAS - AVA(I,A): 1 CM^2
BH CV ECHO MEAS - AVA(I,D): 1 CM^2
BH CV ECHO MEAS - AVA(V,A): 1.2 CM^2
BH CV ECHO MEAS - AVA(V,D): 1.2 CM^2
BH CV ECHO MEAS - BSA(HAYCOCK): 1.9 M^2
BH CV ECHO MEAS - BSA: 1.8 M^2
BH CV ECHO MEAS - BZI_BMI: 28.7 KILOGRAMS/M^2
BH CV ECHO MEAS - BZI_METRIC_HEIGHT: 162.6 CM
BH CV ECHO MEAS - BZI_METRIC_WEIGHT: 75.8 KG
BH CV ECHO MEAS - EDV(MOD-SP2): 105 ML
BH CV ECHO MEAS - EDV(MOD-SP4): 144 ML
BH CV ECHO MEAS - EDV(TEICH): 65.5 ML
BH CV ECHO MEAS - EF(CUBED): 55 %
BH CV ECHO MEAS - EF(MOD-BP): 62.4 %
BH CV ECHO MEAS - EF(MOD-SP2): 64.8 %
BH CV ECHO MEAS - EF(MOD-SP4): 61.1 %
BH CV ECHO MEAS - EF(TEICH): 47.4 %
BH CV ECHO MEAS - ESV(MOD-SP2): 37 ML
BH CV ECHO MEAS - ESV(MOD-SP4): 56 ML
BH CV ECHO MEAS - ESV(TEICH): 34.5 ML
BH CV ECHO MEAS - FS: 23.4 %
BH CV ECHO MEAS - IVS/LVPW: 0.99
BH CV ECHO MEAS - IVSD: 0.96 CM
BH CV ECHO MEAS - LAT PEAK E' VEL: 6.5 CM/SEC
BH CV ECHO MEAS - LV DIASTOLIC VOL/BSA (35-75): 79.5 ML/M^2
BH CV ECHO MEAS - LV MASS(C)D: 116.4 GRAMS
BH CV ECHO MEAS - LV MASS(C)DI: 64.2 GRAMS/M^2
BH CV ECHO MEAS - LV MAX PG: 2.5 MMHG
BH CV ECHO MEAS - LV MEAN PG: 1.4 MMHG
BH CV ECHO MEAS - LV SYSTOLIC VOL/BSA (12-30): 30.9 ML/M^2
BH CV ECHO MEAS - LV V1 MAX: 79.1 CM/SEC
BH CV ECHO MEAS - LV V1 MEAN: 56.4 CM/SEC
BH CV ECHO MEAS - LV V1 VTI: 10.6 CM
BH CV ECHO MEAS - LVIDD: 3.9 CM
BH CV ECHO MEAS - LVIDS: 3 CM
BH CV ECHO MEAS - LVLD AP2: 7.2 CM
BH CV ECHO MEAS - LVLD AP4: 8.2 CM
BH CV ECHO MEAS - LVLS AP2: 5.7 CM
BH CV ECHO MEAS - LVLS AP4: 6.7 CM
BH CV ECHO MEAS - LVOT AREA (M): 3.1 CM^2
BH CV ECHO MEAS - LVOT AREA: 3.1 CM^2
BH CV ECHO MEAS - LVOT DIAM: 2 CM
BH CV ECHO MEAS - LVPWD: 0.98 CM
BH CV ECHO MEAS - MED PEAK E' VEL: 6.9 CM/SEC
BH CV ECHO MEAS - MR MAX PG: 129.5 MMHG
BH CV ECHO MEAS - MR MAX VEL: 569 CM/SEC
BH CV ECHO MEAS - MR MEAN PG: 82.1 MMHG
BH CV ECHO MEAS - MR MEAN VEL: 436 CM/SEC
BH CV ECHO MEAS - MR VTI: 148.9 CM
BH CV ECHO MEAS - MV A DUR: 0.1 SEC
BH CV ECHO MEAS - MV A MAX VEL: 146.1 CM/SEC
BH CV ECHO MEAS - MV DEC SLOPE: 900.6 CM/SEC^2
BH CV ECHO MEAS - MV E MAX VEL: 93.4 CM/SEC
BH CV ECHO MEAS - MV E/A: 0.64
BH CV ECHO MEAS - MV MAX PG: 11.1 MMHG
BH CV ECHO MEAS - MV MEAN PG: 6.6 MMHG
BH CV ECHO MEAS - MV P1/2T MAX VEL: 135.7 CM/SEC
BH CV ECHO MEAS - MV P1/2T: 44.1 MSEC
BH CV ECHO MEAS - MV V2 MAX: 166.8 CM/SEC
BH CV ECHO MEAS - MV V2 MEAN: 125.2 CM/SEC
BH CV ECHO MEAS - MV V2 VTI: 30.4 CM
BH CV ECHO MEAS - MVA P1/2T LCG: 1.6 CM^2
BH CV ECHO MEAS - MVA(P1/2T): 5 CM^2
BH CV ECHO MEAS - MVA(VTI): 1.1 CM^2
BH CV ECHO MEAS - PA ACC TIME: 0.12 SEC
BH CV ECHO MEAS - PA MAX PG (FULL): 1.3 MMHG
BH CV ECHO MEAS - PA MAX PG: 2.8 MMHG
BH CV ECHO MEAS - PA PR(ACCEL): 25.5 MMHG
BH CV ECHO MEAS - PA V2 MAX: 84.4 CM/SEC
BH CV ECHO MEAS - PI END-D VEL: 111.5 CM/SEC
BH CV ECHO MEAS - PULM A REVS DUR: 0.08 SEC
BH CV ECHO MEAS - PULM A REVS VEL: 36.1 CM/SEC
BH CV ECHO MEAS - PULM DIAS VEL: 51.9 CM/SEC
BH CV ECHO MEAS - PULM S/D: 1.2
BH CV ECHO MEAS - PULM SYS VEL: 64.8 CM/SEC
BH CV ECHO MEAS - PVA(V,A): 1.9 CM^2
BH CV ECHO MEAS - PVA(V,D): 1.9 CM^2
BH CV ECHO MEAS - QP/QS: 0.87
BH CV ECHO MEAS - RV MAX PG: 1.5 MMHG
BH CV ECHO MEAS - RV MEAN PG: 0.79 MMHG
BH CV ECHO MEAS - RV V1 MAX: 61.3 CM/SEC
BH CV ECHO MEAS - RV V1 MEAN: 41.8 CM/SEC
BH CV ECHO MEAS - RV V1 VTI: 11 CM
BH CV ECHO MEAS - RVOT AREA: 2.6 CM^2
BH CV ECHO MEAS - RVOT DIAM: 1.8 CM
BH CV ECHO MEAS - SI(AO): 100.3 ML/M^2
BH CV ECHO MEAS - SI(CUBED): 17.9 ML/M^2
BH CV ECHO MEAS - SI(LVOT): 17.9 ML/M^2
BH CV ECHO MEAS - SI(MOD-SP2): 37.5 ML/M^2
BH CV ECHO MEAS - SI(MOD-SP4): 48.6 ML/M^2
BH CV ECHO MEAS - SI(TEICH): 17.1 ML/M^2
BH CV ECHO MEAS - SUP REN AO DIAM: 2.1 CM
BH CV ECHO MEAS - SV(AO): 181.7 ML
BH CV ECHO MEAS - SV(CUBED): 32.4 ML
BH CV ECHO MEAS - SV(LVOT): 32.5 ML
BH CV ECHO MEAS - SV(MOD-SP2): 68 ML
BH CV ECHO MEAS - SV(MOD-SP4): 88 ML
BH CV ECHO MEAS - SV(RVOT): 28.4 ML
BH CV ECHO MEAS - SV(TEICH): 31.1 ML
BH CV ECHO MEAS - TAPSE (>1.6): 1.2 CM
BH CV ECHO MEASUREMENTS AVERAGE E/E' RATIO: 13.94
BH CV XLRA - RV BASE: 2.7 CM
BH CV XLRA - RV LENGTH: 6.2 CM
BH CV XLRA - RV MID: 1.7 CM
BH CV XLRA - TDI S': 13.2 CM/SEC
LV EF 2D ECHO EST: 60 %
SINUS: 2.9 CM
STJ: 2.2 CM

## 2021-07-28 NOTE — PROGRESS NOTES
Subjective:     Encounter Date:07/28/21      Patient ID: Jina Huddleston is a 46 y.o. female.    Chief Complaint:  History of Present Illness    Dear Dalton ZEPEDA,    I had the pleasure seeing this patient in the office today.  This is her first visit in the office.    I saw her July 13, 2020 when she was hospitalized.  She presented the emergency room with complaint of left breast pain due to a tissue expander, she had recently had a bilateral mastectomy.  She was noted to have some tachycardia in the emergency room as well as a heart murmur so she had an echocardiogram.  This demonstrated moderate mitral regurgitation, mild to moderate mitral stenosis, mild aortic insufficiency.  She had never been told before that that she had a heart murmur.  She has a history of Hodgkin's lymphoma and had total chest radiation at that time.  She has a history of left breast cancer felt secondary to the radiation status post bilateral mastectomy    She is scheduled today for a 1 year follow-up and a follow-up echocardiogram.  She came in had the echocardiogram, and then was brought over to see myself.    Patient informs me that in May she was hospitalized at [facility] with heart failure.  She says they never really told her what was going on.  They diuresed her a lot; she says she had a lot of lower extremity edema.  She was discharged home on metoprolol and furosemide.  She states that she was initially in the emergency room at [facility] and was found to be Covid positive but was not approximately was sent home.  She then got more short of breath and came back and had to repeat Covid test that were both negative.  She has now had a Covid vaccination.    I have some of the records from [facility].  They perform an echocardiogram May 23 that showed an ejection fraction 50%, normal RV function, mild mitral stenosis, but they felt was moderate to severe MR at that time, they performed a chest x-ray that apparently showed no  "cardiopulmonary process.  She had a right heart cath that showed a mean PA pressure of 27, wedge pressure 19.  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  She was treated with steroids for exercise-induced asthma and pulmonary fibrosis.    She says she still has shortness of breath.  She still having palpitations.  She continues to have some lower extremity edema.  She has had some dizziness and lightheadedness at times, but no presyncope or syncope.  She has generalized fatigue.  She had a little bit of chest pressure yesterday but that has not been a consistent problem.    The following portions of the patient's history were reviewed and updated as appropriate: allergies, current medications, past family history, past medical history, past social history, past surgical history and problem list.    Procedures       Objective:     Vitals:    07/27/21 1513   BP: 122/84   Pulse: 100   Weight: 74.7 kg (164 lb 9.6 oz)   Height: 163.8 cm (64.5\")     Body mass index is 27.82 kg/m².      Vitals reviewed.   Constitutional:       General: Not in acute distress.     Appearance: Well-developed. Not diaphoretic.   Eyes:      General:         Right eye: No discharge.         Left eye: No discharge.      Conjunctiva/sclera: Conjunctivae normal.      Pupils: Pupils are equal, round, and reactive to light.   HENT:      Head: Normocephalic and atraumatic.      Nose: Nose normal.   Neck:      Thyroid: No thyromegaly.      Trachea: No tracheal deviation.      Lymphadenopathy: No cervical adenopathy.   Pulmonary:      Effort: Pulmonary effort is normal. No respiratory distress.      Breath sounds: Normal breath sounds. No stridor.   Chest:      Chest wall: Not tender to palpatation.   Cardiovascular:      Normal rate. Regular rhythm.      Murmurs: There is a grade 1 to 2/6 mid frequency midsystolic murmur at the URSB, radiating to the neck. There is also a grade " 1/6 mid frequency midsystolic murmur at the URSB, radiating to the neck. There is a grade 1/4 high frequency blowing decrescendo, early diastolic murmur at the URSB, radiating to the apex.      . No S3 gallop. No click. No rub.   Pulses:     Intact distal pulses.   Edema:     Peripheral edema present.     Ankle: bilateral trace edema of the ankle.     Feet: bilateral trace edema of the feet.  Abdominal:      General: Bowel sounds are normal. There is no distension.      Palpations: Abdomen is soft. There is no abdominal mass.      Tenderness: There is no abdominal tenderness. There is no guarding or rebound.   Musculoskeletal: Normal range of motion.         General: No tenderness or deformity.      Cervical back: Normal range of motion and neck supple. Skin:     General: Skin is warm and dry.      Findings: No erythema or rash.   Neurological:      Mental Status: Alert and oriented to person, place, and time.      Deep Tendon Reflexes: Reflexes are normal and symmetric.   Psychiatric:         Thought Content: Thought content normal.         Data and records reviewed:     Lab Results   Component Value Date    GLUCOSE 72 01/18/2021    BUN 15 01/18/2021    CREATININE 0.86 01/18/2021    EGFRIFNONA 71 01/18/2021    BCR 17.4 01/18/2021    K 4.0 01/18/2021    CO2 24.0 01/18/2021    CALCIUM 9.1 01/18/2021    ALBUMIN 4.20 07/10/2020    AST 47 (H) 07/10/2020    ALT 78 (H) 07/10/2020     No results found for: CHOL  No results found for: TRIG  No results found for: HDL  No results found for: LDL  No results found for: VLDL  No results found for: LDLHDL  CBC    CBC 11/5/20 1/18/21   WBC 8.41 7.59   RBC 4.25 4.11   Hemoglobin 14.8 14.3   Hematocrit 42.7 41.6   .5 (A) 101.2 (A)   MCH 34.8 (A) 34.8 (A)   MCHC 34.7 34.4   RDW 13.0 13.6   Platelets 191 192   (A) Abnormal value            No radiology results for the last 90 days.  Results for orders placed during the hospital encounter of 07/27/21    Adult Transthoracic Echo  Complete W/ Cont if Necessary Per Protocol    Interpretation Summary  · Estimated left ventricular EF = 60% Left ventricular systolic function is normal.  · There is mild, bileaflet mitral valve thickening present.  · Moderate mitral valve regurgitation is present.  · Mild to moderate mitral valve stenosis is present.  · Mild to moderate aortic valve regurgitation is present.  · Abnormal global longitudinal LV strain (GLS) = -10.8%.          Assessment:          Diagnosis Plan   1. Valvular heart disease     2. Nonrheumatic mitral valve stenosis     3. Nonrheumatic mitral valve regurgitation     4. Nonrheumatic aortic valve insufficiency     5. 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 Hx of radiation therapy     7. Hodgkin lymphoma, unspecified Hodgkin lymphoma type, unspecified body region (CMS/HCC)     8. History of chemotherapy            Plan:       1.  Valvular heart disease-echocardiogram today reviewed.  She has moderate mitral regurgitation, mild to moderate mitral valve stenosis, mild to moderate aortic insufficiency.  Ejection fraction is maintained but GLS is abnormal at -10.8%.  She noted previously that she had radiation therapy for Hodgkin's but certainly could be playing a role, but she also had frequent strep throat as a child and rheumatic fever would also be a potential etiologic consideration.  Severity of her valvular disease today is not significantly changed from the echocardiogram year ago  2.  Heart failure-patient reports hospitalization at [facility] in May for heart failure.  On review of her records they did diurese her but largely was felt to be a combination of a asthma exacerbation, pulmonary fibrosis, and possible diastolic heart failure.  3.  Pulmonary fibrosis, felt secondary to radiation fibrosis, was seen by pulmonary, apparently they just performed a pulmonary function test, we will call and see if we can get  those results  4.  History of Hodgkin's lymphoma status post total chest radiation age 16  5.  History of breast cancer, felt secondary to radiation therapy as a child, status post bilateral mastectomy.    Thank you very much for allowing us to participate in the care of this pleasant patient.  Please don't hesitate to call if I can be of assistance in any way.      Current Outpatient Medications:   •  albuterol (ACCUNEB) 1.25 MG/3ML nebulizer solution, Take 1 ampule by nebulization Every 6 (Six) Hours As Needed for Wheezing., Disp: , Rfl:   •  albuterol (PROAIR RESPICLICK) 108 (90 Base) MCG/ACT inhaler, Inhale 2 puffs Every 4 (Four) Hours As Needed for Wheezing., Disp: , Rfl:   •  anastrozole (ARIMIDEX) 1 MG tablet, Take 1 mg by mouth Daily., Disp: , Rfl:   •  Ascorbic Acid (Vitamin C) 100 MG chewable tablet, Chew 500 mg Daily., Disp: , Rfl:   •  Calcium Citrate-Vitamin D (CALCIUM + D PO), Take 1 tablet by mouth Every Morning., Disp: , Rfl:   •  cetirizine (zyrTEC) 10 MG tablet, Take 10 mg by mouth Daily. as directed, Disp: , Rfl:   •  desvenlafaxine (PRISTIQ) 50 MG 24 hr tablet, Take 50 mg by mouth Every Night., Disp: , Rfl:   •  diazePAM (VALIUM) 2 MG tablet, Take 2 mg by mouth 2 (Two) Times a Day As Needed for Anxiety., Disp: , Rfl:   •  docosanol (ABREVA) 10 % cream cream, Apply 1 application topically to the appropriate area as directed As Needed., Disp: , Rfl:   •  famotidine (PEPCID) 20 MG tablet, Take 20 mg by mouth Daily., Disp: , Rfl:   •  furosemide (LASIX) 20 MG tablet, Take 1 tablet by mouth Daily., Disp: , Rfl:   •  meloxicam (MOBIC) 15 MG tablet, Take 15 mg by mouth Daily., Disp: , Rfl:   •  metoprolol succinate XL (Toprol XL) 25 MG 24 hr tablet, Take 25 mg by mouth 2 (two) times a day., Disp: , Rfl:   •  pantoprazole (PROTONIX) 20 MG EC tablet, Take 20 mg by mouth 2 (two) times a day., Disp: , Rfl:   •  Symbicort 160-4.5 MCG/ACT inhaler, Inhale 2 puffs 2 (Two) Times a Day., Disp: , Rfl:   No current  facility-administered medications for this visit.         No follow-ups on file.

## 2021-08-04 RX ORDER — METOPROLOL SUCCINATE 25 MG/1
25 TABLET, EXTENDED RELEASE ORAL 2 TIMES DAILY
Qty: 180 TABLET | Refills: 2 | Status: SHIPPED | OUTPATIENT
Start: 2021-08-04 | End: 2022-01-12 | Stop reason: SDUPTHER

## 2021-08-04 RX ORDER — FUROSEMIDE 20 MG/1
20 TABLET ORAL DAILY
Qty: 90 TABLET | Refills: 2 | Status: SHIPPED | OUTPATIENT
Start: 2021-08-04 | End: 2022-04-01

## 2021-08-04 NOTE — TELEPHONE ENCOUNTER
RX Refill:    Metoprolol Succ 25 MG BID    Furosemide 20 MG daily    Pharmacy: [facility]     This will be the first time you prescribe. Is it ok to refill?

## 2021-08-04 NOTE — TELEPHONE ENCOUNTER
Pt is needing clearance for breast implants with Dr.Pallazo. It is not schedule.    Is pt clear?    PT #: [phone]

## 2021-08-07 ENCOUNTER — HOSPITAL ENCOUNTER (INPATIENT)
Facility: HOSPITAL | Age: 46
LOS: 5 days | Discharge: HOME | End: 2021-08-12
Attending: EMERGENCY MEDICINE | Admitting: INTERNAL MEDICINE

## 2021-08-07 DIAGNOSIS — Z86.14 HISTORY OF MRSA INFECTION: ICD-10-CM

## 2021-08-07 DIAGNOSIS — Z85.3 HISTORY OF BREAST CANCER: ICD-10-CM

## 2021-08-07 DIAGNOSIS — N61.1 LEFT BREAST ABSCESS: Primary | ICD-10-CM

## 2021-08-07 PROBLEM — R74.01 TRANSAMINITIS: Status: ACTIVE | Noted: 2021-08-07

## 2021-08-07 PROBLEM — I50.32 CHRONIC DIASTOLIC HEART FAILURE (HCC): Status: ACTIVE | Noted: 2021-08-07

## 2021-08-07 PROBLEM — J84.10 PULMONARY FIBROSIS (HCC): Status: ACTIVE | Noted: 2021-08-07

## 2021-08-07 LAB
ALBUMIN SERPL-MCNC: 4.6 G/DL (ref 3.5–5.2)
ALBUMIN/GLOB SERPL: 1.5 G/DL
ALP SERPL-CCNC: 136 U/L (ref 39–117)
ALT SERPL W P-5'-P-CCNC: 162 U/L (ref 1–33)
ANION GAP SERPL CALCULATED.3IONS-SCNC: 12.9 MMOL/L (ref 5–15)
ANISOCYTOSIS BLD QL: NORMAL
AST SERPL-CCNC: 94 U/L (ref 1–32)
BASOPHILS # BLD AUTO: 0.07 10*3/MM3 (ref 0–0.2)
BASOPHILS NFR BLD AUTO: 0.9 % (ref 0–1.5)
BILIRUB SERPL-MCNC: 0.8 MG/DL (ref 0–1.2)
BUN SERPL-MCNC: 15 MG/DL (ref 6–20)
BUN/CREAT SERPL: 13.6 (ref 7–25)
CALCIUM SPEC-SCNC: 9.9 MG/DL (ref 8.6–10.5)
CHLORIDE SERPL-SCNC: 100 MMOL/L (ref 98–107)
CO2 SERPL-SCNC: 25.1 MMOL/L (ref 22–29)
CREAT SERPL-MCNC: 1.1 MG/DL (ref 0.57–1)
CRP SERPL-MCNC: <0.3 MG/DL (ref 0–0.5)
D-LACTATE SERPL-SCNC: 1.4 MMOL/L (ref 0.5–2)
DEPRECATED RDW RBC AUTO: 53.4 FL (ref 37–54)
EOSINOPHIL # BLD AUTO: 0.23 10*3/MM3 (ref 0–0.4)
EOSINOPHIL NFR BLD AUTO: 2.9 % (ref 0.3–6.2)
ERYTHROCYTE [DISTWIDTH] IN BLOOD BY AUTOMATED COUNT: 13.4 % (ref 12.3–15.4)
GFR SERPL CREATININE-BSD FRML MDRD: 53 ML/MIN/1.73
GLOBULIN UR ELPH-MCNC: 3.1 GM/DL
GLUCOSE SERPL-MCNC: 83 MG/DL (ref 65–99)
HCT VFR BLD AUTO: 44.4 % (ref 34–46.6)
HGB BLD-MCNC: 15.7 G/DL (ref 12–15.9)
IMM GRANULOCYTES # BLD AUTO: 0.02 10*3/MM3 (ref 0–0.05)
IMM GRANULOCYTES NFR BLD AUTO: 0.2 % (ref 0–0.5)
LYMPHOCYTES # BLD AUTO: 2.29 10*3/MM3 (ref 0.7–3.1)
LYMPHOCYTES NFR BLD AUTO: 28.5 % (ref 19.6–45.3)
MACROCYTES BLD QL SMEAR: NORMAL
MCH RBC QN AUTO: 38 PG (ref 26.6–33)
MCHC RBC AUTO-ENTMCNC: 35.4 G/DL (ref 31.5–35.7)
MCV RBC AUTO: 107.5 FL (ref 79–97)
MONOCYTES # BLD AUTO: 0.57 10*3/MM3 (ref 0.1–0.9)
MONOCYTES NFR BLD AUTO: 7.1 % (ref 5–12)
NEUTROPHILS NFR BLD AUTO: 4.85 10*3/MM3 (ref 1.7–7)
NEUTROPHILS NFR BLD AUTO: 60.4 % (ref 42.7–76)
NRBC BLD AUTO-RTO: 0 /100 WBC (ref 0–0.2)
PLAT MORPH BLD: NORMAL
PLATELET # BLD AUTO: 219 10*3/MM3 (ref 140–450)
PMV BLD AUTO: 10.7 FL (ref 6–12)
POTASSIUM SERPL-SCNC: 4.1 MMOL/L (ref 3.5–5.2)
PROCALCITONIN SERPL-MCNC: 0.04 NG/ML (ref 0–0.25)
PROT SERPL-MCNC: 7.7 G/DL (ref 6–8.5)
RBC # BLD AUTO: 4.13 10*6/MM3 (ref 3.77–5.28)
SODIUM SERPL-SCNC: 138 MMOL/L (ref 136–145)
WBC # BLD AUTO: 8.03 10*3/MM3 (ref 3.4–10.8)
WBC MORPH BLD: NORMAL

## 2021-08-07 PROCEDURE — 87040 BLOOD CULTURE FOR BACTERIA: CPT | Performed by: PHYSICIAN ASSISTANT

## 2021-08-07 PROCEDURE — 87070 CULTURE OTHR SPECIMN AEROBIC: CPT | Performed by: PHYSICIAN ASSISTANT

## 2021-08-07 PROCEDURE — 25010000002 VANCOMYCIN 10 G RECONSTITUTED SOLUTION: Performed by: PHYSICIAN ASSISTANT

## 2021-08-07 PROCEDURE — 87205 SMEAR GRAM STAIN: CPT | Performed by: PHYSICIAN ASSISTANT

## 2021-08-07 PROCEDURE — 80053 COMPREHEN METABOLIC PANEL: CPT | Performed by: PHYSICIAN ASSISTANT

## 2021-08-07 PROCEDURE — 83605 ASSAY OF LACTIC ACID: CPT | Performed by: PHYSICIAN ASSISTANT

## 2021-08-07 PROCEDURE — 86140 C-REACTIVE PROTEIN: CPT | Performed by: PHYSICIAN ASSISTANT

## 2021-08-07 PROCEDURE — 84145 PROCALCITONIN (PCT): CPT | Performed by: PHYSICIAN ASSISTANT

## 2021-08-07 PROCEDURE — 99284 EMERGENCY DEPT VISIT MOD MDM: CPT

## 2021-08-07 PROCEDURE — 85007 BL SMEAR W/DIFF WBC COUNT: CPT | Performed by: PHYSICIAN ASSISTANT

## 2021-08-07 PROCEDURE — U0004 COV-19 TEST NON-CDC HGH THRU: HCPCS | Performed by: PHYSICIAN ASSISTANT

## 2021-08-07 PROCEDURE — 85025 COMPLETE CBC W/AUTO DIFF WBC: CPT | Performed by: PHYSICIAN ASSISTANT

## 2021-08-07 PROCEDURE — 25010000002 MORPHINE PER 10 MG: Performed by: EMERGENCY MEDICINE

## 2021-08-07 RX ORDER — SODIUM CHLORIDE 9 MG/ML
100 INJECTION, SOLUTION INTRAVENOUS CONTINUOUS
Status: DISCONTINUED | OUTPATIENT
Start: 2021-08-07 | End: 2021-08-07

## 2021-08-07 RX ORDER — HYDROMORPHONE HYDROCHLORIDE 1 MG/ML
0.5 INJECTION, SOLUTION INTRAMUSCULAR; INTRAVENOUS; SUBCUTANEOUS
Status: DISCONTINUED | OUTPATIENT
Start: 2021-08-07 | End: 2021-08-11

## 2021-08-07 RX ORDER — ACETAMINOPHEN 325 MG/1
650 TABLET ORAL EVERY 4 HOURS PRN
Status: DISCONTINUED | OUTPATIENT
Start: 2021-08-07 | End: 2021-08-12 | Stop reason: HOSPADM

## 2021-08-07 RX ORDER — NALOXONE HCL 0.4 MG/ML
0.4 VIAL (ML) INJECTION
Status: DISCONTINUED | OUTPATIENT
Start: 2021-08-07 | End: 2021-08-11

## 2021-08-07 RX ORDER — ACETAMINOPHEN 650 MG/1
650 SUPPOSITORY RECTAL EVERY 4 HOURS PRN
Status: DISCONTINUED | OUTPATIENT
Start: 2021-08-07 | End: 2021-08-12 | Stop reason: HOSPADM

## 2021-08-07 RX ORDER — MORPHINE SULFATE 2 MG/ML
4 INJECTION, SOLUTION INTRAMUSCULAR; INTRAVENOUS ONCE
Status: COMPLETED | OUTPATIENT
Start: 2021-08-07 | End: 2021-08-07

## 2021-08-07 RX ORDER — ONDANSETRON 2 MG/ML
4 INJECTION INTRAMUSCULAR; INTRAVENOUS EVERY 6 HOURS PRN
Status: DISCONTINUED | OUTPATIENT
Start: 2021-08-07 | End: 2021-08-12 | Stop reason: HOSPADM

## 2021-08-07 RX ORDER — SODIUM CHLORIDE 0.9 % (FLUSH) 0.9 %
10 SYRINGE (ML) INJECTION AS NEEDED
Status: DISCONTINUED | OUTPATIENT
Start: 2021-08-07 | End: 2021-08-12 | Stop reason: HOSPADM

## 2021-08-07 RX ORDER — SODIUM CHLORIDE 0.9 % (FLUSH) 0.9 %
10 SYRINGE (ML) INJECTION EVERY 12 HOURS SCHEDULED
Status: DISCONTINUED | OUTPATIENT
Start: 2021-08-07 | End: 2021-08-12 | Stop reason: HOSPADM

## 2021-08-07 RX ORDER — ACETAMINOPHEN 160 MG/5ML
650 SOLUTION ORAL EVERY 4 HOURS PRN
Status: DISCONTINUED | OUTPATIENT
Start: 2021-08-07 | End: 2021-08-12 | Stop reason: HOSPADM

## 2021-08-07 RX ORDER — SODIUM CHLORIDE 9 MG/ML
100 INJECTION, SOLUTION INTRAVENOUS CONTINUOUS
Status: DISCONTINUED | OUTPATIENT
Start: 2021-08-07 | End: 2021-08-08

## 2021-08-07 RX ADMIN — SODIUM CHLORIDE 1000 ML: 9 INJECTION, SOLUTION INTRAVENOUS at 22:09

## 2021-08-07 RX ADMIN — VANCOMYCIN HYDROCHLORIDE 1500 MG: 10 INJECTION, POWDER, LYOPHILIZED, FOR SOLUTION INTRAVENOUS at 22:18

## 2021-08-07 RX ADMIN — MORPHINE SULFATE 4 MG: 2 INJECTION, SOLUTION INTRAMUSCULAR; INTRAVENOUS at 22:05

## 2021-08-07 NOTE — ED TRIAGE NOTES
Pt has sore on left breast having yellow drainage coming from breast. Pt states had lumpectomy on left side states has breast expander had flipped upside down before this started and flipped back talked to MD told to come to ER.

## 2021-08-08 ENCOUNTER — APPOINTMENT (OUTPATIENT)
Dept: ULTRASOUND IMAGING | Facility: HOSPITAL | Age: 46
End: 2021-08-08

## 2021-08-08 LAB
ALBUMIN SERPL-MCNC: 4.5 G/DL (ref 3.5–5.2)
ALBUMIN/GLOB SERPL: 1.8 G/DL
ALP SERPL-CCNC: 134 U/L (ref 39–117)
ALT SERPL W P-5'-P-CCNC: 152 U/L (ref 1–33)
ANION GAP SERPL CALCULATED.3IONS-SCNC: 11.6 MMOL/L (ref 5–15)
AST SERPL-CCNC: 87 U/L (ref 1–32)
BASOPHILS # BLD AUTO: 0.07 10*3/MM3 (ref 0–0.2)
BASOPHILS NFR BLD AUTO: 1.2 % (ref 0–1.5)
BILIRUB SERPL-MCNC: 0.7 MG/DL (ref 0–1.2)
BUN SERPL-MCNC: 15 MG/DL (ref 6–20)
BUN/CREAT SERPL: 16.1 (ref 7–25)
CALCIUM SPEC-SCNC: 8.9 MG/DL (ref 8.6–10.5)
CHLORIDE SERPL-SCNC: 105 MMOL/L (ref 98–107)
CO2 SERPL-SCNC: 23.4 MMOL/L (ref 22–29)
CREAT SERPL-MCNC: 0.93 MG/DL (ref 0.57–1)
D-LACTATE SERPL-SCNC: 1.5 MMOL/L (ref 0.5–2)
DEPRECATED RDW RBC AUTO: 51 FL (ref 37–54)
EOSINOPHIL # BLD AUTO: 0.23 10*3/MM3 (ref 0–0.4)
EOSINOPHIL NFR BLD AUTO: 4.1 % (ref 0.3–6.2)
ERYTHROCYTE [DISTWIDTH] IN BLOOD BY AUTOMATED COUNT: 13 % (ref 12.3–15.4)
GFR SERPL CREATININE-BSD FRML MDRD: 64 ML/MIN/1.73
GLOBULIN UR ELPH-MCNC: 2.5 GM/DL
GLUCOSE SERPL-MCNC: 91 MG/DL (ref 65–99)
HAV IGM SERPL QL IA: NORMAL
HBV CORE IGM SERPL QL IA: NORMAL
HBV SURFACE AG SERPL QL IA: NORMAL
HCT VFR BLD AUTO: 43 % (ref 34–46.6)
HCV AB SER DONR QL: NORMAL
HGB BLD-MCNC: 14.6 G/DL (ref 12–15.9)
IMM GRANULOCYTES # BLD AUTO: 0.02 10*3/MM3 (ref 0–0.05)
IMM GRANULOCYTES NFR BLD AUTO: 0.4 % (ref 0–0.5)
INR PPP: 0.95 (ref 0.9–1.1)
LYMPHOCYTES # BLD AUTO: 1.7 10*3/MM3 (ref 0.7–3.1)
LYMPHOCYTES NFR BLD AUTO: 30.1 % (ref 19.6–45.3)
MCH RBC QN AUTO: 36.4 PG (ref 26.6–33)
MCHC RBC AUTO-ENTMCNC: 34 G/DL (ref 31.5–35.7)
MCV RBC AUTO: 107.2 FL (ref 79–97)
MONOCYTES # BLD AUTO: 0.38 10*3/MM3 (ref 0.1–0.9)
MONOCYTES NFR BLD AUTO: 6.7 % (ref 5–12)
NEUTROPHILS NFR BLD AUTO: 3.24 10*3/MM3 (ref 1.7–7)
NEUTROPHILS NFR BLD AUTO: 57.5 % (ref 42.7–76)
NRBC BLD AUTO-RTO: 0 /100 WBC (ref 0–0.2)
PLATELET # BLD AUTO: 200 10*3/MM3 (ref 140–450)
PMV BLD AUTO: 10.3 FL (ref 6–12)
POTASSIUM SERPL-SCNC: 3.9 MMOL/L (ref 3.5–5.2)
PROT SERPL-MCNC: 7 G/DL (ref 6–8.5)
PROTHROMBIN TIME: 12.5 SECONDS (ref 11.7–14.2)
RBC # BLD AUTO: 4.01 10*6/MM3 (ref 3.77–5.28)
SARS-COV-2 ORF1AB RESP QL NAA+PROBE: NOT DETECTED
SODIUM SERPL-SCNC: 140 MMOL/L (ref 136–145)
TSH SERPL DL<=0.05 MIU/L-ACNC: 7.24 UIU/ML (ref 0.27–4.2)
WBC # BLD AUTO: 5.64 10*3/MM3 (ref 3.4–10.8)

## 2021-08-08 PROCEDURE — 25010000002 VANCOMYCIN 10 G RECONSTITUTED SOLUTION: Performed by: NURSE PRACTITIONER

## 2021-08-08 PROCEDURE — 80074 ACUTE HEPATITIS PANEL: CPT | Performed by: NURSE PRACTITIONER

## 2021-08-08 PROCEDURE — 94799 UNLISTED PULMONARY SVC/PX: CPT

## 2021-08-08 PROCEDURE — 84443 ASSAY THYROID STIM HORMONE: CPT | Performed by: INTERNAL MEDICINE

## 2021-08-08 PROCEDURE — 25010000002 ONDANSETRON PER 1 MG: Performed by: NURSE PRACTITIONER

## 2021-08-08 PROCEDURE — 99223 1ST HOSP IP/OBS HIGH 75: CPT | Performed by: INTERNAL MEDICINE

## 2021-08-08 PROCEDURE — 83605 ASSAY OF LACTIC ACID: CPT | Performed by: NURSE PRACTITIONER

## 2021-08-08 PROCEDURE — 85025 COMPLETE CBC W/AUTO DIFF WBC: CPT | Performed by: NURSE PRACTITIONER

## 2021-08-08 PROCEDURE — 25010000002 HYDROMORPHONE PER 4 MG: Performed by: NURSE PRACTITIONER

## 2021-08-08 PROCEDURE — 76705 ECHO EXAM OF ABDOMEN: CPT

## 2021-08-08 PROCEDURE — 36415 COLL VENOUS BLD VENIPUNCTURE: CPT | Performed by: NURSE PRACTITIONER

## 2021-08-08 PROCEDURE — 85610 PROTHROMBIN TIME: CPT | Performed by: NURSE PRACTITIONER

## 2021-08-08 PROCEDURE — 80053 COMPREHEN METABOLIC PANEL: CPT | Performed by: NURSE PRACTITIONER

## 2021-08-08 RX ORDER — DESVENLAFAXINE SUCCINATE 50 MG/1
50 TABLET, EXTENDED RELEASE ORAL NIGHTLY
Status: DISCONTINUED | OUTPATIENT
Start: 2021-08-08 | End: 2021-08-12 | Stop reason: HOSPADM

## 2021-08-08 RX ORDER — CALCIUM CARBONATE/VITAMIN D3 500-10/5ML
30 LIQUID (ML) ORAL DAILY
COMMUNITY

## 2021-08-08 RX ORDER — FLUTICASONE PROPIONATE 50 MCG
2 SPRAY, SUSPENSION (ML) NASAL DAILY
COMMUNITY

## 2021-08-08 RX ORDER — FUROSEMIDE 20 MG/1
20 TABLET ORAL DAILY
Status: DISCONTINUED | OUTPATIENT
Start: 2021-08-08 | End: 2021-08-08

## 2021-08-08 RX ORDER — PANTOPRAZOLE SODIUM 20 MG/1
20 TABLET, DELAYED RELEASE ORAL
Status: DISCONTINUED | OUTPATIENT
Start: 2021-08-08 | End: 2021-08-08

## 2021-08-08 RX ORDER — FAMOTIDINE 20 MG/1
20 TABLET, FILM COATED ORAL DAILY
Status: DISCONTINUED | OUTPATIENT
Start: 2021-08-08 | End: 2021-08-12 | Stop reason: HOSPADM

## 2021-08-08 RX ORDER — ANASTROZOLE 1 MG/1
1 TABLET ORAL DAILY
Status: DISCONTINUED | OUTPATIENT
Start: 2021-08-08 | End: 2021-08-12 | Stop reason: HOSPADM

## 2021-08-08 RX ORDER — ALBUTEROL SULFATE 2.5 MG/3ML
2.5 SOLUTION RESPIRATORY (INHALATION) EVERY 4 HOURS PRN
Status: DISCONTINUED | OUTPATIENT
Start: 2021-08-08 | End: 2021-08-12 | Stop reason: HOSPADM

## 2021-08-08 RX ORDER — PANTOPRAZOLE SODIUM 40 MG/1
40 TABLET, DELAYED RELEASE ORAL
Status: DISCONTINUED | OUTPATIENT
Start: 2021-08-08 | End: 2021-08-12 | Stop reason: HOSPADM

## 2021-08-08 RX ORDER — DIAZEPAM 2 MG/1
2 TABLET ORAL 2 TIMES DAILY PRN
Status: DISCONTINUED | OUTPATIENT
Start: 2021-08-08 | End: 2021-08-12 | Stop reason: HOSPADM

## 2021-08-08 RX ORDER — CETIRIZINE HYDROCHLORIDE 10 MG/1
10 TABLET ORAL DAILY
Status: DISCONTINUED | OUTPATIENT
Start: 2021-08-08 | End: 2021-08-12 | Stop reason: HOSPADM

## 2021-08-08 RX ORDER — ASCORBIC ACID 500 MG
250 TABLET ORAL DAILY
Status: DISCONTINUED | OUTPATIENT
Start: 2021-08-08 | End: 2021-08-12 | Stop reason: HOSPADM

## 2021-08-08 RX ORDER — CALCIUM CARBONATE 500(1250)
500 TABLET ORAL DAILY
Status: DISCONTINUED | OUTPATIENT
Start: 2021-08-08 | End: 2021-08-12 | Stop reason: HOSPADM

## 2021-08-08 RX ORDER — METOPROLOL SUCCINATE 25 MG/1
25 TABLET, EXTENDED RELEASE ORAL
Status: DISCONTINUED | OUTPATIENT
Start: 2021-08-08 | End: 2021-08-12 | Stop reason: HOSPADM

## 2021-08-08 RX ORDER — BUDESONIDE AND FORMOTEROL FUMARATE DIHYDRATE 160; 4.5 UG/1; UG/1
2 AEROSOL RESPIRATORY (INHALATION) 2 TIMES DAILY
Status: DISCONTINUED | OUTPATIENT
Start: 2021-08-08 | End: 2021-08-12 | Stop reason: HOSPADM

## 2021-08-08 RX ADMIN — HYDROMORPHONE HYDROCHLORIDE 0.5 MG: 1 INJECTION, SOLUTION INTRAMUSCULAR; INTRAVENOUS; SUBCUTANEOUS at 21:40

## 2021-08-08 RX ADMIN — HYDROMORPHONE HYDROCHLORIDE 0.5 MG: 1 INJECTION, SOLUTION INTRAMUSCULAR; INTRAVENOUS; SUBCUTANEOUS at 08:32

## 2021-08-08 RX ADMIN — HYDROMORPHONE HYDROCHLORIDE 0.5 MG: 1 INJECTION, SOLUTION INTRAMUSCULAR; INTRAVENOUS; SUBCUTANEOUS at 00:17

## 2021-08-08 RX ADMIN — DESVENLAFAXINE SUCCINATE 50 MG: 50 TABLET, EXTENDED RELEASE ORAL at 02:07

## 2021-08-08 RX ADMIN — DIAZEPAM 2 MG: 2 TABLET ORAL at 00:54

## 2021-08-08 RX ADMIN — FUROSEMIDE 20 MG: 20 TABLET ORAL at 08:33

## 2021-08-08 RX ADMIN — ANASTROZOLE 1 MG: 1 TABLET ORAL at 08:32

## 2021-08-08 RX ADMIN — SODIUM CHLORIDE, PRESERVATIVE FREE 10 ML: 5 INJECTION INTRAVENOUS at 00:39

## 2021-08-08 RX ADMIN — ONDANSETRON 4 MG: 2 INJECTION INTRAMUSCULAR; INTRAVENOUS at 02:10

## 2021-08-08 RX ADMIN — HYDROMORPHONE HYDROCHLORIDE 0.5 MG: 1 INJECTION, SOLUTION INTRAMUSCULAR; INTRAVENOUS; SUBCUTANEOUS at 02:10

## 2021-08-08 RX ADMIN — OXYCODONE HYDROCHLORIDE AND ACETAMINOPHEN 250 MG: 500 TABLET ORAL at 08:32

## 2021-08-08 RX ADMIN — DESVENLAFAXINE SUCCINATE 50 MG: 50 TABLET, EXTENDED RELEASE ORAL at 20:12

## 2021-08-08 RX ADMIN — PANTOPRAZOLE SODIUM 40 MG: 40 TABLET, DELAYED RELEASE ORAL at 04:43

## 2021-08-08 RX ADMIN — HYDROMORPHONE HYDROCHLORIDE 0.5 MG: 1 INJECTION, SOLUTION INTRAMUSCULAR; INTRAVENOUS; SUBCUTANEOUS at 11:13

## 2021-08-08 RX ADMIN — VANCOMYCIN HYDROCHLORIDE 1500 MG: 10 INJECTION, POWDER, LYOPHILIZED, FOR SOLUTION INTRAVENOUS at 11:09

## 2021-08-08 RX ADMIN — CETIRIZINE HYDROCHLORIDE 10 MG: 10 TABLET ORAL at 08:32

## 2021-08-08 RX ADMIN — BUDESONIDE AND FORMOTEROL FUMARATE DIHYDRATE 2 PUFF: 160; 4.5 AEROSOL RESPIRATORY (INHALATION) at 11:48

## 2021-08-08 RX ADMIN — HYDROMORPHONE HYDROCHLORIDE 0.5 MG: 1 INJECTION, SOLUTION INTRAMUSCULAR; INTRAVENOUS; SUBCUTANEOUS at 04:23

## 2021-08-08 RX ADMIN — FAMOTIDINE 20 MG: 20 TABLET, FILM COATED ORAL at 08:33

## 2021-08-08 RX ADMIN — ONDANSETRON 4 MG: 2 INJECTION INTRAMUSCULAR; INTRAVENOUS at 19:35

## 2021-08-08 RX ADMIN — METOPROLOL SUCCINATE 25 MG: 25 TABLET, EXTENDED RELEASE ORAL at 08:33

## 2021-08-08 RX ADMIN — ONDANSETRON 4 MG: 2 INJECTION INTRAMUSCULAR; INTRAVENOUS at 08:53

## 2021-08-08 RX ADMIN — HYDROMORPHONE HYDROCHLORIDE 0.5 MG: 1 INJECTION, SOLUTION INTRAMUSCULAR; INTRAVENOUS; SUBCUTANEOUS at 19:35

## 2021-08-08 RX ADMIN — HYDROMORPHONE HYDROCHLORIDE 0.5 MG: 1 INJECTION, SOLUTION INTRAMUSCULAR; INTRAVENOUS; SUBCUTANEOUS at 15:19

## 2021-08-08 RX ADMIN — Medication 500 MG: at 08:32

## 2021-08-08 RX ADMIN — BUDESONIDE AND FORMOTEROL FUMARATE DIHYDRATE 2 PUFF: 160; 4.5 AEROSOL RESPIRATORY (INHALATION) at 21:49

## 2021-08-08 RX ADMIN — SODIUM CHLORIDE, PRESERVATIVE FREE 10 ML: 5 INJECTION INTRAVENOUS at 20:32

## 2021-08-08 RX ADMIN — SODIUM CHLORIDE, PRESERVATIVE FREE 10 ML: 5 INJECTION INTRAVENOUS at 08:35

## 2021-08-08 RX ADMIN — SODIUM CHLORIDE 100 ML/HR: 9 INJECTION, SOLUTION INTRAVENOUS at 00:39

## 2021-08-08 NOTE — PLAN OF CARE
Goal Outcome Evaluation:  Plan of Care Reviewed With: patient        Progress: no change  Outcome Summary: Received from ER today with left breast abscess. Pt had mastectomy in 2020 and had issues with an expander. There are 2 open spots on her left breast with small yellow discharge. Wound cultures done in ER. Blood cultures drawn as well. Vancomycin started. ID consult in am. , plastic surgeon consulted as well. I called her office at 4 am, but answering service said they can only page on call provider, and not able to relay consult messages. Needs to be recalled in am. However,  is aware as she is the one who sent pt to ER. Pt is ad vitaly. Dilaudid and zofran given.Continue to monitor.

## 2021-08-08 NOTE — PROGRESS NOTES
"[facility]  Clinical Pharmacy Department     Vancomycin Pharmacokinetic Note    Jina Huddleston is a 46 y.o. female who is on day 1 of pharmacy to dose vancomycin for complicated skin and soft tissue infection.    Target level: AUC24 400-600  Consulting Provider:  Adri Carreno  Planned Duration of Therapy: 5 days  Other Antimicrobials:      Allergies  Allergies as of 08/07/2021   • (No Known Allergies)       Microbiology:  Microbiology Results (last 10 days)       ** No results found for the last 240 hours. **            Radiology/Imaging:       Vitals/Labs/I&O        There is no height or weight on file to calculate BMI.   [unfilled]    Results from last 7 days   Lab Units 08/07/21  2104   WBC 10*3/mm3 8.03     Results from last 7 days   Lab Units 08/07/21  2104   LACTATE mmol/L 1.4      Results from last 7 days   Lab Units 08/07/21  2104   PROCALCITONIN ng/mL 0.04     Results from last 7 days   Lab Units 08/07/21  2104   CRP mg/dL <0.30              Estimated Creatinine Clearance: 62.6 mL/min (A) (by C-G formula based on SCr of 1.1 mg/dL (H)).  Results from last 7 days   Lab Units 08/07/21  2104   BUN mg/dL 15   CREATININE mg/dL 1.10*     Intake & Output (last 3 days)       None            Vancomycin Dosing and Level History:  Last Dose Received in the ED/Outside Facility: 1500 mg at 2200  Is Patient on Dialysis or Renal Replacement:    Inpatient Dosing History (date/time):                    Assessment/Plan:    Assessment:  Bayesian analysis of the most recent level(s) using [employer] provides the following patient-specific pharmacokinetic parameters:   CL: 3.04 L/h   Vd: 62.3 L   T1/2: 14.6 h  If the predicted trough on this regimen is not within what was previously considered a \"target trough range\", the AUC24 (a stronger predictor of vancomycin efficacy) is predicted to achieve the accepted target of 400-600 mg*h/L. To best balance efficacy and toxicity, we will be targeting AUC24 in this " patient rather than steady-state trough levels.     Initiating the regimen of 1500 mg (18.5 mg/kg) IV every 24 hours gives a predicted steady-state trough of 13.5 mg/L and AUC24 of 491 mg*h/L based upon population pharmacokinetics and this patient's specific parameters.    Recommendations/Plan:  -Initiate vancomycin 1500 mg (18.5 mg/kg) IV every 24 hours   -Obtain vancomycin level on 8/10/21 at 0930 prior to the fourth dose or sooner if acute changes   -Continue to monitor SCr    Thank you for involving pharmacy in this patient's care. Please contact pharmacy with any questions or concerns.                           Merrick Landers [facility]  Clinical Pharmacist  08/07/21 23:29 EDT

## 2021-08-08 NOTE — PLAN OF CARE
Goal Outcome Evaluation:           Progress: improving  Outcome Summary: Prior nurse left care resumed at 1630, pt aox4, vanc given, NPO for liver ultrasound, dilaudid given q2, ad vitaly, 2L NC PRN pt O2 sats dropping when sleeping or laying flat, VSS no acute needs

## 2021-08-08 NOTE — PROGRESS NOTES
Name: Jina Huddleston ADMIT: 2021   : 1975  PCP: Dalton Salinas APRN    MRN: [id number] LOS: 1 days   AGE/SEX: 46 y.o. female  ROOM: [facility]     Subjective   Subjective   Patient continues with pain/swelling/discharge from the left breast.  No fever.  Positive chills or night sweats.    Review of Systems  Cardiovascular/respiratory.  Positive atypical chest pain.  Positive shortness of breath.  No cough.  No wheeze.  No hemoptysis.  GI.  Positive occasional nausea.  No vomiting.  No abdominal pain.  No bowel movement since admission.  .  No dysuria or hematuria.     Objective   Objective   Vital Signs  Temp:  [96.5 °F (35.8 °C)-97.6 °F (36.4 °C)] 96.5 °F (35.8 °C)  Heart Rate:  [] 95  Resp:  [16-18] 18  BP: (133-161)/(80-93) 150/93  SpO2:  [94 %-100 %] 97 %  on   ;   Device (Oxygen Therapy): room air    Intake/Output Summary (Last 24 hours) at 2021 1220  Last data filed at 2021 1100  Gross per 24 hour   Intake 1620 ml   Output 600 ml   Net 1020 ml     Body mass index is 30.54 kg/m².      21  0040   Weight: 80.7 kg (177 lb 14.6 oz)     Physical Exam  General.  Middle-aged female.  Alert and oriented x3.  In mild distress because of the pain.  Eyes.  Pupils equal round and reactive.  Intact extraocular musculature.  No pallor or jaundice.  Normal conjunctivae and lids.  Oral cavity.  Moist mucous membrane.  Neck.  Supple.  No JVD.  No lymphadenopathy or thyromegaly.  Cardiovascular.  Regular rate and rhythm.  Grade 2 systolic murmur.  Chest.  Bilateral air entry with no added sounds.  Breasts.  Scars both breasts.  Expanders in place.  There is redness/hotness/induration over the left breast with a purulent discharge coming from 1/2 x 0.25 cm ulcer.  No axillary lymphadenopathy.  Abdomen.  Soft lax.  No tenderness.  No organomegaly.  No guarding or rebound.  Extremities.  No clubbing cyanosis or edema.    Results Review:      Results from last 7 days   Lab Units  08/08/21  0949 08/07/21  2104   SODIUM mmol/L 140 138   POTASSIUM mmol/L 3.9 4.1   CHLORIDE mmol/L 105 100   CO2 mmol/L 23.4 25.1   BUN mg/dL 15 15   CREATININE mg/dL 0.93 1.10*   GLUCOSE mg/dL 91 83   CALCIUM mg/dL 8.9 9.9   AST (SGOT) U/L 87* 94*   ALT (SGPT) U/L 152* 162*     Estimated Creatinine Clearance: 76 mL/min (by C-G formula based on SCr of 0.93 mg/dL).                            Invalid input(s):  PHOS        Invalid input(s): LDLCALC  Results from last 7 days   Lab Units 08/08/21  0948 08/07/21 2104 08/07/21 2104   WBC 10*3/mm3 5.64  --  8.03   HEMOGLOBIN g/dL 14.6  --  15.7   HEMATOCRIT % 43.0  --  44.4   PLATELETS 10*3/mm3 200  --  219   MCV fL 107.2*   < > 107.5*   MCH pg 36.4*   < > 38.0*   MCHC g/dL 34.0   < > 35.4   RDW % 13.0   < > 13.4   RDW-SD fl 51.0   < > 53.4   MPV fL 10.3   < > 10.7   NEUTROPHIL % % 57.5   < > 60.4   LYMPHOCYTE % % 30.1   < > 28.5   MONOCYTES % % 6.7   < > 7.1   EOSINOPHIL % % 4.1   < > 2.9   BASOPHIL % % 1.2   < > 0.9   IMM GRAN % % 0.4   < > 0.2   NEUTROS ABS 10*3/mm3 3.24   < > 4.85   LYMPHS ABS 10*3/mm3 1.70   < > 2.29   MONOS ABS 10*3/mm3 0.38   < > 0.57   EOS ABS 10*3/mm3 0.23   < > 0.23   BASOS ABS 10*3/mm3 0.07   < > 0.07   IMMATURE GRANS (ABS) 10*3/mm3 0.02   < > 0.02   NRBC /100 WBC 0.0   < > 0.0    < > = values in this interval not displayed.     Results from last 7 days   Lab Units 08/08/21  0949   INR  0.95         Results from last 7 days   Lab Units 08/08/21  0948 08/07/21  2104   PROCALCITONIN ng/mL  --  0.04   LACTATE mmol/L 1.5 1.4     Results from last 7 days   Lab Units 08/07/21  2104   CRP mg/dL <0.30         Results from last 7 days   Lab Units 08/07/21  2207   WOUNDCX  No growth                   Imaging:  Imaging Results (Last 24 Hours)     ** No results found for the last 24 hours. 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           · Left breast abscess in a patient with a history of left breast cancer s/p bilateral mastectomy and left breast expander insertion.  Patient with a previous left breast expander infection with MRSA infection status post removal.  Lactic acid and pro calcitonin are normal.  No clinical evidence of sepsis with no fever and normal white count.  Wound cultures and blood cultures are pending.  ID following.  Currently on IV vancomycin.  Surgical consultation is pending and she might have to have a removal of the left breast expander.  Continue Arimidex.  · History of Hodgkin's lymphoma/macrocytosis..  Normal CBC except macrocytosis.  Will check B12/folate/TSH/cortisol.  Will monitor.  · History of chronic diastolic congestive heart failure/MS/MR.  Does not appear to have angina or congestive heart failure at this time.  Positive dyspnea.  We will stop IV fluid.  · Transaminitis.  Hepatitis panel is negative.  Benign GI examination.  In view of history of cancer will check right upper quadrant ultrasound.  · Radiation pulmonary fibrosis.  Stable respiratory status.  · Acute kidney failure.  Mostly prerenal azotemia.  This has resolved with IV fluid.  Patient euvolemic.  We will stop IV fluid and Lasix at this time.  Will monitor  renal function.  · VTE prophylaxis.  Sequential compression devices.      · Discussed discussed my findings and plan of treatment with the patient.      Tra Holcomb MD  [facility]  08/08/21  12:20 EDT

## 2021-08-08 NOTE — ED PROVIDER NOTES
EMERGENCY DEPARTMENT ENCOUNTER    Room Number:  P388/1  Date of encounter:  8/9/2021  PCP: Dalton Salinas APRN  Historian: Patient      HPI:  Chief Complaint: Left breast infection      Context: Jina Huddleston is a 46 y.o. female with past medical history of breast cancer s/p double mastectomy, history of Hodgkin's lymphoma, CHF, pulmonary fibrosis who presents to the ED c/o infection of the left breast.  Patient states that about 5 days ago a wound opened up and has had increased redness, pain, and swelling of the left breast with purulent drainage.  Denies any fever, chills, sweats, chest pain, or shortness of breath.  Patient states that she has had a double mastectomy as well as breast expanders and has had previous infection in the past with MRSA.      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, estrogen receptor positive (CMS/[facility]) 03/31/2020   • Breast pain, left 07/10/2020   • Tachycardia 07/11/2020   • Asthma 07/11/2020   • GERD without esophagitis 07/11/2020   • Dyspepsia 07/12/2020   • Diarrhea 07/12/2020   • Murmur, heart 07/12/2020   • Seroma of breast, left 07/12/2020   • Valvular heart disease 07/13/2020   • Acquired absence of left breast 01/20/2021   • Hx of radiation therapy 1992   • Hodgkin lymphoma (CMS/[facility]) 01/1992   • History of chemotherapy 1992   • Nonrheumatic mitral valve stenosis 1992   • Nonrheumatic mitral valve regurgitation 1992   • Nonrheumatic aortic valve insufficiency 1992     Resolved Ambulatory Problems     Diagnosis Date Noted   • No Resolved Ambulatory Problems     Past Medical History:   Diagnosis Date   • Asthma, exercise induced    • Back pain    • Bipolar I disorder (CMS/[facility])    • Drug therapy    • GERD (gastroesophageal reflux disease)    • Heart murmur    • Low back pain    • Marijuana use    • MRSA infection 10/2020   • Neuropathy          PAST SURGICAL HISTORY  Past  Surgical History:   Procedure Laterality Date   • BREAST ABSCESS INCISION AND DRAINAGE Left 7/14/2020    Procedure: INCISION AND DRAINAGE OF LEFT BREAST SEROMA,;  Surgeon: Radha Guevara MD;  Location: [facility];  Service: Plastics;  Laterality: Left;   • BREAST BIOPSY     • BREAST BIOPSY     • BREAST IMPLANT SURGERY Left 7/14/2020    Procedure: REMOVAL OF TISSUE EXPANDER;  Surgeon: Radha Guevara MD;  Location: [facility] OR;  Service: Plastics;  Laterality: Left;   • BREAST RECONSTRUCTION Bilateral 5/6/2020    Procedure: BILATERAL BREAST RECONSTRUCTION WITH TISSUE EXPANDERSAND ALLODERM;  Surgeon: Radha Guevara MD;  Location: [facility] OR;  Service: Plastics;  Laterality: Bilateral;   • BREAST TISSUE EXPANDER INSERTION Left 1/20/2021    Procedure: DELAYED LEFT BREAST INSERTION OF TISSUE EXPANDER;  Surgeon: Radha Guevara MD;  Location: [facility];  Service: Plastics;  Laterality: Left;   • COLONOSCOPY     • CYST REMOVAL      HAND   • DIAGNOSTIC LAPAROSCOPY     • ENDOSCOPY     • HYSTERECTOMY     • INSERTION OF BREAST TISSUE EXPANDER Bilateral    • KNEE SURGERY Left    • MASTECTOMY W/ SENTINEL NODE BIOPSY Bilateral 5/6/2020    Procedure: bilateral total mastectomy, bilateral sentinel node biopsy;  Surgeon: Shreya Tim MD;  Location: [facility];  Service: General;  Laterality: Bilateral;   • MULTIPLE TOOTH EXTRACTIONS     • TOE SURGERY Right    • TUBAL ABDOMINAL LIGATION     • VENOUS ACCESS DEVICE (PORT) INSERTION AND REMOVAL           FAMILY HISTORY  Family History   Problem Relation Age of Onset   • Cancer Maternal Uncle         UNKNOWN ORGIN    • Prostate cancer Maternal Uncle         UNKNOWN AGE    • Malig Hyperthermia Neg Hx          SOCIAL HISTORY  Social History     Socioeconomic History   • Marital status:      Spouse name: Not on file   • Number of children: Not on file   • Years of education: Not on file   • Highest education level: Not on file    Tobacco Use   • Smoking status: Former Smoker     Packs/day: 0.50     Years: 33.00     Pack years: 16.50     Types: Cigarettes     Start date:      Quit date: 10/18/2020     Years since quittin.8   • Smokeless tobacco: Never Used   Vaping Use   • Vaping Use: Never used   Substance and Sexual Activity   • Alcohol use: Not Currently     Comment: recovering alcoholic  and went back to drinking 2020 for a few months  now has stopped again   • Drug use: Yes     Frequency: 1.0 times per week     Types: Marijuana     Comment: edible          ALLERGIES  Patient has no known allergies.        REVIEW OF SYSTEMS  Review of Systems     All systems reviewed and negative except for those discussed in HPI.       PHYSICAL EXAM    I have reviewed the triage vital signs and nursing notes.    ED Triage Vitals [21 1723]   Temp Heart Rate Resp BP SpO2   96.8 °F (36 °C) 99 18 -- 99 %      Temp src Heart Rate Source Patient Position BP Location FiO2 (%)   Tympanic -- -- -- --       Physical Exam  GENERAL: Alert and oriented x3, not distressed  HENT: nares patent  EYES: no scleral icterus, PERRL, EOMI  CV: regular rhythm, regular rate, no murmurs, rubs, or gallops  RESPIRATORY: normal effort, clear to auscultate bilaterally  ABDOMEN: soft  MUSCULOSKELETAL: no deformity  NEURO: alert, moves all extremities, follows commands  SKIN: warm, dry        LAB RESULTS  Recent Results (from the past 24 hour(s))   Comprehensive Metabolic Panel    Collection Time: 21  7:23 AM    Specimen: Blood   Result Value Ref Range    Glucose 100 (H) 65 - 99 mg/dL    BUN 12 6 - 20 mg/dL    Creatinine 0.80 0.57 - 1.00 mg/dL    Sodium 139 136 - 145 mmol/L    Potassium 4.8 3.5 - 5.2 mmol/L    Chloride 102 98 - 107 mmol/L    CO2 29.0 22.0 - 29.0 mmol/L    Calcium 9.2 8.6 - 10.5 mg/dL    Total Protein 6.8 6.0 - 8.5 g/dL    Albumin 4.10 3.50 - 5.20 g/dL    ALT (SGPT) 140 (H) 1 - 33 U/L    AST (SGOT) 75 (H) 1 - 32 U/L    Alkaline Phosphatase  105 39 - 117 U/L    Total Bilirubin 0.8 0.0 - 1.2 mg/dL    eGFR Non African Amer 77 >60 mL/min/1.73    Globulin 2.7 gm/dL    A/G Ratio 1.5 g/dL    BUN/Creatinine Ratio 15.0 7.0 - 25.0    Anion Gap 8.0 5.0 - 15.0 mmol/L   Vitamin B12    Collection Time: 08/09/21  7:23 AM    Specimen: Blood   Result Value Ref Range    Vitamin B-12 459 211 - 946 pg/mL   Folate    Collection Time: 08/09/21  7:23 AM    Specimen: Blood   Result Value Ref Range    Folate >20.00 4.78 - 24.20 ng/mL   Cortisol    Collection Time: 08/09/21  7:23 AM    Specimen: Blood   Result Value Ref Range    Cortisol 9.40   mcg/dL   CBC Auto Differential    Collection Time: 08/09/21  7:23 AM    Specimen: Blood   Result Value Ref Range    WBC 5.53 3.40 - 10.80 10*3/mm3    RBC 3.52 (L) 3.77 - 5.28 10*6/mm3    Hemoglobin 13.1 12.0 - 15.9 g/dL    Hematocrit 37.4 34.0 - 46.6 %    .3 (H) 79.0 - 97.0 fL    MCH 37.2 (H) 26.6 - 33.0 pg    MCHC 35.0 31.5 - 35.7 g/dL    RDW 13.0 12.3 - 15.4 %    RDW-SD 50.7 37.0 - 54.0 fl    MPV 10.2 6.0 - 12.0 fL    Platelets 179 140 - 450 10*3/mm3    Neutrophil % 67.2 42.7 - 76.0 %    Lymphocyte % 20.8 19.6 - 45.3 %    Monocyte % 6.0 5.0 - 12.0 %    Eosinophil % 4.5 0.3 - 6.2 %    Basophil % 1.1 0.0 - 1.5 %    Neutrophils, Absolute 3.72 1.70 - 7.00 10*3/mm3    Lymphocytes, Absolute 1.15 0.70 - 3.10 10*3/mm3    Monocytes, Absolute 0.33 0.10 - 0.90 10*3/mm3    Eosinophils, Absolute 0.25 0.00 - 0.40 10*3/mm3    Basophils, Absolute 0.06 0.00 - 0.20 10*3/mm3   T4, Free    Collection Time: 08/09/21  4:25 PM    Specimen: Blood   Result Value Ref Range    Free T4 1.02 0.93 - 1.70 ng/dL       Ordered the above labs and independently reviewed the results.        RADIOLOGY  No Radiology Exams Resulted Within Past 24 Hours    I ordered the above noted radiological studies. Reviewed by me and discussed with radiologist.  See dictation for official radiology interpretation.      PROCEDURES    Procedures      MEDICATIONS GIVEN IN  ER    Medications   sodium chloride 0.9 % flush 10 mL (has no administration in time range)   sodium chloride 0.9 % flush 10 mL (10 mL Intravenous Given 8/9/21 0926)   sodium chloride 0.9 % flush 10 mL (has no administration in time range)   Pharmacy to dose vancomycin (has no administration in time range)   acetaminophen (TYLENOL) tablet 650 mg (650 mg Oral Given 8/9/21 0751)     Or   acetaminophen (TYLENOL) 160 MG/5ML solution 650 mg ( Oral Not Given:  See Alt 8/9/21 0751)     Or   acetaminophen (TYLENOL) suppository 650 mg ( Rectal Not Given:  See Alt 8/9/21 0751)   HYDROmorphone (DILAUDID) injection 0.5 mg (0.5 mg Intravenous Given 8/9/21 1836)     And   naloxone (NARCAN) injection 0.4 mg (has no administration in time range)   ondansetron (ZOFRAN) injection 4 mg (4 mg Intravenous Given 8/9/21 0746)   vancomycin 1500 mg/500 mL 0.9% NS IVPB ([facility]) (1,500 mg Intravenous New Bag 8/9/21 1136)   albuterol (PROVENTIL) nebulizer solution 0.083% 2.5 mg/3mL (has no administration in time range)   anastrozole (ARIMIDEX) tablet 1 mg (1 mg Oral Given 8/9/21 0925)   ascorbic acid (VITAMIN C) tablet 250 mg (250 mg Oral Given 8/9/21 0925)   calcium carbonate (oyster shell) tablet 500 mg (500 mg Oral Given 8/9/21 0925)   cetirizine (zyrTEC) tablet 10 mg (10 mg Oral Given 8/9/21 0925)   desvenlafaxine (PRISTIQ) 24 hr tablet 50 mg (50 mg Oral Given 8/8/21 2012)   diazePAM (VALIUM) tablet 2 mg (2 mg Oral Given 8/8/21 0054)   famotidine (PEPCID) tablet 20 mg (20 mg Oral Given 8/9/21 0925)   metoprolol succinate XL (TOPROL-XL) 24 hr tablet 25 mg (25 mg Oral Given 8/9/21 0925)   budesonide-formoterol (SYMBICORT) 160-4.5 MCG/ACT inhaler 2 puff (2 puffs Inhalation Given 8/9/21 6074)   pantoprazole (PROTONIX) EC tablet 40 mg (40 mg Oral Given 8/9/21 1638)   sodium chloride 0.9 % bolus 1,000 mL (0 mL Intravenous Stopped 8/8/21 0005)   vancomycin 1500 mg/500 mL 0.9% NS IVPB ([facility]) (0 mg/kg × 74.7 kg Intravenous Stopped 8/8/21 0006)    morphine injection 4 mg (4 mg Intravenous Given 8/7/21 2205)         PROGRESS, DATA ANALYSIS, CONSULTS, AND MEDICAL DECISION MAKING    All labs have been independently reviewed by me.  All radiology studies have been reviewed by me and discussed with radiologist dictating the report.   EKG's independently viewed and interpreted by me.  Discussion below represents my analysis of pertinent findings related to patient's condition, differential diagnosis, treatment plan and final disposition.    DDx: Includes but not limited to left breast cellulitis, left breast abscess, inflammatory breast cancer    ED Course as of Aug 09 2058   Sat Aug 07, 2021   2056 WBC: 8.03 [JESI]   2234 Hemoglobin: 15.7 [JESI]   2234 Platelets: 219 [JESI]   2234 Glucose: 83 [JESI]   2234 BUN: 15 [JESI]   2234 Creatinine(!): 1.10 [JESI]   2234 Sodium: 138 [JESI]   2234 Potassium: 4.1 [JESI]   2234 C-Reactive Protein: <0.30 [JESI]   2234 Procalcitonin: 0.04 [JESI]   2234 Lactate: 1.4 [JESI]   2244 Patient care discussed with Lisa ZEPEDA, will admit to Dr. Rojo.    [JESI]      ED Course User Index  [JESI] Jesús Manuel PA       MDM: Patient has infection of her left breast that has had previous MRSA infections and an infected tissue expander after a previous mastectomy. We have obtained blood cultures and start her on IV vancomycin and will admit for further evaluation and plastic surgery consultation.    PPE: Both the patient and I wore a surgical mask throughout the entire patient encounter. I wore protective goggles.     AS OF 20:58 EDT VITALS:    BP - 148/92  HR - 96  TEMP - 97.5 °F (36.4 °C) (Oral)  O2 SATS - 100%        DIAGNOSIS  Final diagnoses:   Left breast abscess   History of breast cancer   History of MRSA infection         DISPOSITION  ADMISSION    Discussed treatment plan and reason for admission with pt/family and admitting physician.  Pt/family voiced understanding of the plan for admission for further testing/treatment as needed.               Jesús Manuel, PA  08/07/21 6376       Jesús Manuel, PA  08/09/21 3175

## 2021-08-08 NOTE — ED NOTES
.  Nursing report ED to floor  Jina Huddleston  46 y.o.  female    HPI (triage note):   Chief Complaint   Patient presents with   • Wound Check       Admitting doctor:   Winston Rojo MD    Admitting diagnosis:   The primary encounter diagnosis was Left breast abscess. Diagnoses of History of breast cancer and History of MRSA infection were also pertinent to this visit.    Code status:   Current Code Status     Date Active Code Status Order ID Comments User Context       8/7/2021 2329 CPR [id number]  Adri Carreno, APRN ED     Advance Care Planning Activity      Questions for Current Code Status     Question Answer Comment    Code Status CPR     Medical Interventions (Level of Support Prior to Arrest) Full           Allergies:   Patient has no known allergies.    Weight:   There were no vitals filed for this visit.    Most recent vitals:   Vitals:    08/07/21 1723 08/07/21 1949 08/07/21 2220   BP:   143/89   Pulse: 99 105    Resp: 18 16    Temp: 96.8 °F (36 °C)     TempSrc: Tympanic     SpO2: 99% 98%        Active LDAs/IV Access:   Lines, Drains & Airways    Active LDAs     Name:   Placement date:   Placement time:   Site:   Days:    Peripheral IV 08/07/21 2148 Right;Distal;Upper;Medial Arm   08/07/21 2148    Arm   less than 1    Closed/Suction Drain 1 Left Breast Bulb 15 Fr.   01/20/21    1616    Breast   199                Labs (abnormal labs have a star):   Labs Reviewed   COMPREHENSIVE METABOLIC PANEL - Abnormal; Notable for the following components:       Result Value    Creatinine 1.10 (*)     ALT (SGPT) 162 (*)     AST (SGOT) 94 (*)     Alkaline Phosphatase 136 (*)     eGFR Non  Amer 53 (*)     All other components within normal limits    Narrative:     GFR Normal >60  Chronic Kidney Disease <60  Kidney Failure <15     CBC WITH AUTO DIFFERENTIAL - Abnormal; Notable for the following components:    .5 (*)     MCH 38.0 (*)     All other components within normal limits   LACTIC ACID,  "PLASMA - Normal   PROCALCITONIN - Normal    Narrative:     As a Marker for Sepsis (Non-Neonates):     1. <0.5 ng/mL represents a low risk of severe sepsis and/or septic shock.  2. >2 ng/mL represents a high risk of severe sepsis and/or septic shock.    As a Marker for Lower Respiratory Tract Infections that require antibiotic therapy:  PCT on Admission     Antibiotic Therapy             6-12 Hrs later  >0.5                          Strongly Recommended            >0.25 - <0.5             Recommended  0.1 - 0.25                  Discouraged                       Remeasure/reassess PCT  <0.1                         Strongly Discouraged         Remeasure/reassess PCT      As 28 day mortality risk marker: \"Change in Procalcitonin Result\" (>80% or <=80%) if Day 0 (or Day 1) and Day 4 values are available. Refer to http://www.Fixetudepct-calculator.[employer]/    Change in PCT <=80 %   A decrease of PCT levels below or equal to 80% defines a positive change in PCT test result representing a higher risk for 28-day all-cause mortality of patients diagnosed with severe sepsis or septic shock.    Change in PCT >80 %   A decrease of PCT levels of more than 80% defines a negative change in PCT result representing a lower risk for 28-day all-cause mortality of patients diagnosed with severe sepsis or septic shock.              Results may be falsely decreased if patient taking Biotin.    C-REACTIVE PROTEIN - Normal   COVID PRE-OP / PRE-PROCEDURE SCREENING ORDER (NO ISOLATION)    Narrative:     The following orders were created for panel order COVID PRE-OP / PRE-PROCEDURE SCREENING ORDER (NO ISOLATION) - Swab, Nasopharynx.  Procedure                               Abnormality         Status                     ---------                               -----------         ------                     COVID-19,APTIMA PANTHER,...[[id number]]                      In process                   Please view results for these tests on the individual " orders.   BLOOD CULTURE   BLOOD CULTURE   WOUND CULTURE   COVID-19,APTIMA PANT,[facility] IN-HOUSE,NP/OP SWAB IN UTM/VTM/[facility] TRANSPORT MEDIA,24 HR TAT   SCAN SLIDE   CBC WITH AUTO DIFFERENTIAL   PROTIME-INR   LACTIC ACID, PLASMA   HEPATITIS PANEL, ACUTE   COMPREHENSIVE METABOLIC PANEL   CBC AND DIFFERENTIAL    Narrative:     The following orders were created for panel order CBC & Differential.  Procedure                               Abnormality         Status                     ---------                               -----------         ------                     CBC Auto Differential[[id number]]        Abnormal            Final result               Scan Slide[[id number]]                                       Final result                 Please view results for these tests on the individual orders.       EKG:   No orders to display       Meds given in ED:   Medications   sodium chloride 0.9 % flush 10 mL (has no administration in time range)   sodium chloride 0.9 % flush 10 mL (has no administration in time range)   sodium chloride 0.9 % flush 10 mL (has no administration in time range)   Pharmacy to dose vancomycin (has no administration in time range)   acetaminophen (TYLENOL) tablet 650 mg (has no administration in time range)     Or   acetaminophen (TYLENOL) 160 MG/5ML solution 650 mg (has no administration in time range)     Or   acetaminophen (TYLENOL) suppository 650 mg (has no administration in time range)   HYDROmorphone (DILAUDID) injection 0.5 mg (has no administration in time range)     And   naloxone (NARCAN) injection 0.4 mg (has no administration in time range)   ondansetron (ZOFRAN) injection 4 mg (has no administration in time range)   vancomycin 1500 mg/500 mL 0.9% NS IVPB ([facility]) (has no administration in time range)   sodium chloride 0.9 % infusion (has no administration in time range)   sodium chloride 0.9 % bolus 1,000 mL (1,000 mL Intravenous New Bag 8/7/21 3120)   vancomycin 1500 mg/500 mL 0.9%  NS IVPB ([facility]) (1,500 mg Intravenous New Bag 21)   morphine injection 4 mg (4 mg Intravenous Given 21)       Imaging results:  No radiology results for the last day    Ambulatory status:   - ad vitaly    Social issues:   Social History     Socioeconomic History   • Marital status:      Spouse name: Not on file   • Number of children: Not on file   • Years of education: Not on file   • Highest education level: Not on file   Tobacco Use   • Smoking status: Former Smoker     Packs/day: 0.50     Years: 33.00     Pack years: 16.50     Types: Cigarettes     Start date:      Quit date: 10/18/2020     Years since quittin.8   • Smokeless tobacco: Never Used   Vaping Use   • Vaping Use: Never used   Substance and Sexual Activity   • Alcohol use: Not Currently     Comment: recovering alcoholic  and went back to drinking 2020 for a few months  now has stopped again   • Drug use: Yes     Frequency: 1.0 times per week     Types: Marijuana     Comment: edible     Nursing report ED to floor       Luz Maria Yan RN  21 0005

## 2021-08-08 NOTE — H&P
"    Patient Name:  Jina Huddleston  YOB: 1975  MRN:  [id number]  Admit Date:  8/7/2021  Patient Care Team:  Dalton Salinas APRN as PCP - General (Family Medicine)      Subjective   History Present Illness     Chief Complaint   Patient presents with   • Wound Check     History of Present Illness   Mrs. Huddleston is a 46-year-old female with history of Hodgkin lymphoma, left breast cancer, valvular heart disease, chronic diastolic heart failure, GERD, mastectomy bilaterally who presents to the emergency room with left breast drainage.  Patient has had a bilateral mastectomy and has had multiple complications with her left breast expander.  Patient had expanders for quite a while, June 2020 she had a infection of her expander with and they were removed, they were left out for about 6 months, in January 2021 patient had expander placed again, she has been trying to get on the surgery schedule for breast implants, but has been hospitalized for shortness of breath, diagnosed with pulmonary fibrosis, also diagnosed with congestive heart failure recently and started on metoprolol and Lasix.  About 2 weeks patient stated that her expander had \"flipped and was in the wrong place\", but then shortly after waking up and finding it flipped it flipped back and then she noted a bruise on the lower part of her breast, that developed into a dry scabbed feeling area, then she developed some redness at the incision site, she noted some swelling and tenderness in her breast, this morning she woke up with a large amount of yellow drainage on her shirt.  Patient does have history of MRSA in her foot.  -In the emergency room vital signs were normal, temp 96.8, heart rate 99, respiratory 18, blood pressure 143/89, 99% on room air.  Glucose 83, sodium 130, potassium 4.1, creatinine 1.10, BUN 15, , AST 94, alkaline phosphatase 136, CRP less than 0.30, lactate 1.4, pro calcitonin 0.04, white blood cell count 8.03, " hemoglobin 15.7, hematocrit 44.4, platelets 219.  Blood cultures and wound culture obtained in the emergency room, awaiting results.  Patient was started on vancomycin in the emergency room.    Review of Systems   Constitutional: Negative for appetite change and fever.   HENT: Negative for nosebleeds and trouble swallowing.    Eyes: Negative for photophobia, redness and visual disturbance.   Respiratory: Negative for cough, chest tightness, shortness of breath and wheezing.    Cardiovascular: Negative for chest pain, palpitations and leg swelling.   Gastrointestinal: Negative for abdominal distention, abdominal pain, nausea and vomiting.   Endocrine: Negative.    Genitourinary: Negative.    Musculoskeletal: Negative for gait problem and joint swelling.   Skin: Positive for wound (left breast wound, previous mastectomy).   Neurological: Negative for dizziness, seizures, speech difficulty, light-headedness and headaches.   Hematological: Negative.    Psychiatric/Behavioral: Negative for behavioral problems and confusion.        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, estrogen receptor positive (CMS/HCC) 3/31/2020   • Drug therapy     For Hodgkin's Lymphoma   • GERD (gastroesophageal reflux disease)    • Heart murmur    • History of chemotherapy 1992   • Hodgkin lymphoma (CMS/HCC) 01/1992   • Hx of radiation therapy 1992    Chest wall for Hodgkin's   • Low back pain    • Marijuana use    • MRSA infection 10/2020    right little toe after a surgery  [facility]   • Neuropathy     FINGERS   • Nonrheumatic aortic valve insufficiency 1/1/1992   • Nonrheumatic mitral valve stenosis 1/1/1992     Past Surgical History:   Procedure Laterality Date   • BREAST ABSCESS INCISION AND DRAINAGE Left 7/14/2020     Procedure: INCISION AND DRAINAGE OF LEFT BREAST SEROMA,;  Surgeon: Radha Guevara MD;  Location: [facility] OR;  Service: Plastics;  Laterality: Left;   • BREAST BIOPSY     • BREAST BIOPSY     • BREAST IMPLANT SURGERY Left 2020    Procedure: REMOVAL OF TISSUE EXPANDER;  Surgeon: Radha Guevara MD;  Location: [facility] OR;  Service: Plastics;  Laterality: Left;   • BREAST RECONSTRUCTION Bilateral 2020    Procedure: BILATERAL BREAST RECONSTRUCTION WITH TISSUE EXPANDERSAND ALLODERM;  Surgeon: Radha Guevara MD;  Location: [facility] OR;  Service: Plastics;  Laterality: Bilateral;   • BREAST TISSUE EXPANDER INSERTION Left 2021    Procedure: DELAYED LEFT BREAST INSERTION OF TISSUE EXPANDER;  Surgeon: Radha Guevara MD;  Location: [facility];  Service: Plastics;  Laterality: Left;   • COLONOSCOPY     • CYST REMOVAL      HAND   • DIAGNOSTIC LAPAROSCOPY     • ENDOSCOPY     • HYSTERECTOMY     • INSERTION OF BREAST TISSUE EXPANDER Bilateral    • KNEE SURGERY Left    • MASTECTOMY W/ SENTINEL NODE BIOPSY Bilateral 2020    Procedure: bilateral total mastectomy, bilateral sentinel node biopsy;  Surgeon: Shreya Tim MD;  Location: [facility];  Service: General;  Laterality: Bilateral;   • MULTIPLE TOOTH EXTRACTIONS     • TOE SURGERY Right    • TUBAL ABDOMINAL LIGATION     • VENOUS ACCESS DEVICE (PORT) INSERTION AND REMOVAL       Family History   Problem Relation Age of Onset   • Cancer Maternal Uncle         UNKNOWN ORGIN    • Prostate cancer Maternal Uncle         UNKNOWN AGE    • Malig Hyperthermia Neg Hx      Social History     Tobacco Use   • Smoking status: Former Smoker     Packs/day: 0.50     Years: 33.00     Pack years: 16.50     Types: Cigarettes     Start date:      Quit date: 10/18/2020     Years since quittin.8   • Smokeless tobacco: Never Used   Vaping Use   • Vaping Use: Never used   Substance Use Topics   • Alcohol use: Not Currently      Comment: recovering alcoholic 2004 and went back to drinking 2020 feb for a few months  now has stopped again   • Drug use: Yes     Frequency: 1.0 times per week     Types: Marijuana     Comment: edible      No current facility-administered medications on file prior to encounter.     Current Outpatient Medications on File Prior to Encounter   Medication Sig Dispense Refill   • albuterol (ACCUNEB) 1.25 MG/3ML nebulizer solution Take 1 ampule by nebulization Every 6 (Six) Hours As Needed for Wheezing.     • albuterol (PROAIR RESPICLICK) 108 (90 Base) MCG/ACT inhaler Inhale 2 puffs Every 4 (Four) Hours As Needed for Wheezing.     • anastrozole (ARIMIDEX) 1 MG tablet Take 1 mg by mouth Daily.     • Ascorbic Acid (Vitamin C) 100 MG chewable tablet Chew 500 mg Daily.     • Calcium Citrate-Vitamin D (CALCIUM + D PO) Take 1 tablet by mouth Every Morning.     • cetirizine (zyrTEC) 10 MG tablet Take 10 mg by mouth Daily. as directed     • desvenlafaxine (PRISTIQ) 50 MG 24 hr tablet Take 50 mg by mouth Every Night.     • diazePAM (VALIUM) 2 MG tablet Take 2 mg by mouth 2 (Two) Times a Day As Needed for Anxiety.     • docosanol (ABREVA) 10 % cream cream Apply 1 application topically to the appropriate area as directed As Needed.     • famotidine (PEPCID) 20 MG tablet Take 20 mg by mouth Daily.     • furosemide (LASIX) 20 MG tablet Take 1 tablet by mouth Daily. 90 tablet 2   • meloxicam (MOBIC) 15 MG tablet Take 15 mg by mouth Daily.     • metoprolol succinate XL (Toprol XL) 25 MG 24 hr tablet Take 1 tablet by mouth 2 (two) times a day. 180 tablet 2   • pantoprazole (PROTONIX) 20 MG EC tablet Take 20 mg by mouth 2 (two) times a day.     • Symbicort 160-4.5 MCG/ACT inhaler Inhale 2 puffs 2 (Two) Times a Day.       No Known Allergies    Objective    Objective     Vital Signs  Temp:  [96.8 °F (36 °C)] 96.8 °F (36 °C)  Heart Rate:  [] 105  Resp:  [16-18] 16  BP: (143)/(89) 143/89  SpO2:  [98 %-99 %] 98 %  on   ;   Device  (Oxygen Therapy): room air  There is no height or weight on file to calculate BMI.    Physical Exam  Vitals and nursing note reviewed.   Constitutional:       General: She is not in acute distress.     Appearance: She is well-developed.   HENT:      Head: Normocephalic.   Neck:      Vascular: No JVD.   Cardiovascular:      Rate and Rhythm: Normal rate and regular rhythm.      Comments: Normal sinus rhythm on the monitor, heart rate 78 during my exam.  No chest pain or shortness of breath.  No peripheral edema at this time  Pulmonary:      Effort: Pulmonary effort is normal.      Breath sounds: Normal breath sounds.      Comments: Lung sounds clear, sats 97% on room air  Chest:       Abdominal:      General: There is no distension.      Palpations: Abdomen is soft.      Tenderness: There is no abdominal tenderness.   Musculoskeletal:         General: Normal range of motion.      Cervical back: Normal range of motion.      Right lower leg: No edema.      Left lower leg: No edema.   Skin:     General: Skin is warm and dry.      Capillary Refill: Capillary refill takes less than 2 seconds.   Neurological:      Mental Status: She is alert and oriented to person, place, and time.   Psychiatric:         Behavior: Behavior normal.         Results Review:  I reviewed the patient's new clinical results.  I reviewed the patient's new imaging results and agree with the interpretation.  I reviewed the patient's other test results and agree with the interpretation  I personally viewed and interpreted the patient's EKG/Telemetry data  Discussed with ED provider.    Lab Results (last 24 hours)     Procedure Component Value Units Date/Time    CBC & Differential [[id number]]  (Abnormal) Collected: 08/07/21 2104    Specimen: Blood Updated: 08/07/21 2154    Narrative:      The following orders were created for panel order CBC & Differential.  Procedure                               Abnormality         Status                      ---------                               -----------         ------                     CBC Auto Differential[[id number]]        Abnormal            Final result               Scan Slide[[id number]]                                       Final result                 Please view results for these tests on the individual orders.    Comprehensive Metabolic Panel [[id number]]  (Abnormal) Collected: 08/07/21 2104    Specimen: Blood Updated: 08/07/21 2158     Glucose 83 mg/dL      BUN 15 mg/dL      Creatinine 1.10 mg/dL      Sodium 138 mmol/L      Potassium 4.1 mmol/L      Comment: Specimen hemolyzed.  Results may be affected.        Chloride 100 mmol/L      CO2 25.1 mmol/L      Calcium 9.9 mg/dL      Total Protein 7.7 g/dL      Albumin 4.60 g/dL      ALT (SGPT) 162 U/L      Comment: Specimen hemolyzed.  Results may be affected.        AST (SGOT) 94 U/L      Comment: Specimen hemolyzed.  Results may be affected.        Alkaline Phosphatase 136 U/L      Total Bilirubin 0.8 mg/dL      eGFR Non African Amer 53 mL/min/1.73      Globulin 3.1 gm/dL      A/G Ratio 1.5 g/dL      BUN/Creatinine Ratio 13.6     Anion Gap 12.9 mmol/L     Narrative:      GFR Normal >60  Chronic Kidney Disease <60  Kidney Failure <15      Blood Culture - Blood, Arm, Left [[id number]] Collected: 08/07/21 2104    Specimen: Blood from Arm, Left Updated: 08/07/21 2121    Blood Culture - Blood, Arm, Left [[id number]] Collected: 08/07/21 2104    Specimen: Blood from Arm, Left Updated: 08/07/21 2120    Lactic Acid, Plasma [[id number]]  (Normal) Collected: 08/07/21 2104    Specimen: Blood Updated: 08/07/21 2135     Lactate 1.4 mmol/L     Procalcitonin [[id number]]  (Normal) Collected: 08/07/21 2104    Specimen: Blood Updated: 08/07/21 2150     Procalcitonin 0.04 ng/mL     Narrative:      As a Marker for Sepsis (Non-Neonates):     1. <0.5 ng/mL represents a low risk of severe sepsis and/or septic shock.  2. >2 ng/mL represents a high risk of severe sepsis and/or  "septic shock.    As a Marker for Lower Respiratory Tract Infections that require antibiotic therapy:  PCT on Admission     Antibiotic Therapy             6-12 Hrs later  >0.5                          Strongly Recommended            >0.25 - <0.5             Recommended  0.1 - 0.25                  Discouraged                       Remeasure/reassess PCT  <0.1                         Strongly Discouraged         Remeasure/reassess PCT      As 28 day mortality risk marker: \"Change in Procalcitonin Result\" (>80% or <=80%) if Day 0 (or Day 1) and Day 4 values are available. Refer to http://[email or web address] [email or web address]pct[facility]cal[email or web address]/    Change in PCT <=80 %   A decrease of PCT levels below or equal to 80% defines a positive change in PCT test result representing a higher risk for 28-day all-cause mortality of patients diagnosed with severe sepsis or septic shock.    Change in PCT >80 %   A decrease of PCT levels of more than 80% defines a negative change in PCT result representing a lower risk for 28-day all-cause mortality of patients diagnosed with severe sepsis or septic shock.              Results may be falsely decreased if patient taking Biotin.     C-reactive Protein [[id number]]  (Normal) Collected: 08/07/21 2104    Specimen: Blood Updated: 08/07/21 2158     C-Reactive Protein <0.30 mg/dL     CBC Auto Differential [[id number]]  (Abnormal) Collected: 08/07/21 2104    Specimen: Blood Updated: 08/07/21 2154     WBC 8.03 10*3/mm3      RBC 4.13 10*6/mm3      Hemoglobin 15.7 g/dL      Hematocrit 44.4 %      .5 fL      MCH 38.0 pg      MCHC 35.4 g/dL      RDW 13.4 %      RDW-SD 53.4 fl      MPV 10.7 fL      Platelets 219 10*3/mm3      Neutrophil % 60.4 %      Lymphocyte % 28.5 %      Monocyte % 7.1 %      Eosinophil % 2.9 %      Basophil % 0.9 %      Immature Grans % 0.2 %      Neutrophils, Absolute 4.85 10*3/mm3      Lymphocytes, Absolute 2.29 10*3/mm3      Monocytes, Absolute 0.57 10*3/mm3      Eosinophils, Absolute 0.23 " 10*3/mm3      Basophils, Absolute 0.07 10*3/mm3      Immature Grans, Absolute 0.02 10*3/mm3      nRBC 0.0 /100 WBC     Scan Slide [[id number]] Collected: 08/07/21 2104    Specimen: Blood Updated: 08/07/21 2154     Anisocytosis Slight/1+     Macrocytes Slight/1+     WBC Morphology Normal     Platelet Morphology Normal    COVID PRE-OP / PRE-PROCEDURE SCREENING ORDER (NO ISOLATION) - Swab, Nasopharynx [[id number]] Collected: 08/07/21 2106    Specimen: Swab from Nasopharynx Updated: 08/07/21 2134    Narrative:      The following orders were created for panel order COVID PRE-OP / PRE-PROCEDURE SCREENING ORDER (NO ISOLATION) - Swab, Nasopharynx.  Procedure                               Abnormality         Status                     ---------                               -----------         ------                     COVID-19,APTIMA PANTHER,...[[id number]]                      In process                   Please view results for these tests on the individual orders.    COVID-19,APTIMA PANTHER,[facility] IN-HOUSE, NP/OP SWAB IN UTM/VTM/[facility] TRANSPORT MEDIA,24 HR TAT - Swab, Nasopharynx [[id number]] Collected: 08/07/21 2106    Specimen: Swab from Nasopharynx Updated: 08/07/21 2134    Wound Culture - Wound, Breast, Left [[id number]] Collected: 08/07/21 2207    Specimen: Wound from Breast, Left Updated: 08/07/21 2213          Imaging Results (Last 24 Hours)     ** No results found for the last 24 hours. **          Results for orders placed during the hospital encounter of 07/27/21    Adult Transthoracic Echo Complete W/ Cont if Necessary Per Protocol    Interpretation Summary  · Estimated left ventricular EF = 60% Left ventricular systolic function is normal.  · There is mild, bileaflet mitral valve thickening present.  · Moderate mitral valve regurgitation is present.  · Mild to moderate mitral valve stenosis is present.  · Mild to moderate aortic valve regurgitation is present.  · Abnormal global longitudinal LV strain (GLS) =  -10.8%.      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 Huddleston is a 46-year-old female with history of Hodgkin lymphoma, left breast cancer, valvular heart disease, chronic diastolic heart failure, GERD, mastectomy bilaterally who presents to the emergency room with left breast drainage.     Left breast abscess/history of MRSA/history of breast cancer/Hodgkin's lymphoma  -Patient started on vancomycin in the emergency room, will continue that pharmacy to dose  -Blood cultures and wound culture are pending  -Infectious disease consulted due to history of MRSA  -Plastic surgery consulted due to breast expander/infection  -Pain control overnight    Transaminitis  -ALT AST more elevated than prior study a year ago  -Acute hepatitis panel  -Patient denies any abdominal pain, nausea or vomiting  -CMP in a.m.    Valvular heart disease/heart failure/heart murmur  -Chronic conditions are stable at this time  -monitor for fluid volume overload  -Continue home medications      · I discussed the patient's findings and my recommendations with patient and ED provider.    VTE Prophylaxis - SCDs.  Code Status - Full code.       DUANE Wallace  [facility]  08/07/21  23:29 EDT

## 2021-08-08 NOTE — CONSULTS
Referring Provider: Dr Rojo    Subjective   History of present illness: 46-year-old we are asked evaluate for breast abscess.  Per review past medical records, she is known to our group from an admission in July 2020.  The patient has a history of bipolar disorder, Hodgkin's lymphoma and left breast cancer status post bilateral mastectomy on May 6, 2020 with implantation of bilateral tissue expanders.  She underwent I&D with spacer removal on 7/14/2020 and prior cultures are growing coag negative staph which was susceptible to linezolid.  She completed 14 days of this and patient told me she had reexplantation of her breast expanders in February 2021.  Patient reports that she had done well up until 5 days ago when she developed left breast swelling and bruising around the inferior portion of the expander which then turned into a wound.  She had some chills but no luda fevers.  Unfortunately her breast started draining and she was started on Bactrim.  She was told to come to the ER if symptoms worsen and presented to [facility] emergency department 8/7/2021.  There she had a white count of 88 with a creatinine of 1.1 and mildly elevated liver function tests with an ALT of 162 and AST of 94.  Procalcitonin was negative as was CRP.  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, estrogen receptor positive (CMS/HCC) 3/31/2020   • Drug therapy     For Hodgkin's Lymphoma   • GERD (gastroesophageal reflux disease)    • Heart murmur    • History of chemotherapy 1992   • Hodgkin lymphoma (CMS/HCC) 01/1992   • Hx of radiation therapy 1992    Chest wall for Hodgkin's   • Low back pain    • Marijuana use    • MRSA infection 10/2020    right little toe after a  surgery  [facility]   • Neuropathy     FINGERS   • Nonrheumatic aortic valve insufficiency 1/1/1992   • Nonrheumatic mitral valve stenosis 1/1/1992       Past Surgical History:   Procedure Laterality Date   • BREAST ABSCESS INCISION AND DRAINAGE Left 7/14/2020    Procedure: INCISION AND DRAINAGE OF LEFT BREAST SEROMA,;  Surgeon: Radha Guevara MD;  Location: [facility];  Service: Plastics;  Laterality: Left;   • BREAST BIOPSY     • BREAST BIOPSY     • BREAST IMPLANT SURGERY Left 7/14/2020    Procedure: REMOVAL OF TISSUE EXPANDER;  Surgeon: Radha Guevara MD;  Location: [facility];  Service: Plastics;  Laterality: Left;   • BREAST RECONSTRUCTION Bilateral 5/6/2020    Procedure: BILATERAL BREAST RECONSTRUCTION WITH TISSUE EXPANDERSAND ALLODERM;  Surgeon: aRdha Guevara MD;  Location: [facility];  Service: Plastics;  Laterality: Bilateral;   • BREAST TISSUE EXPANDER INSERTION Left 1/20/2021    Procedure: DELAYED LEFT BREAST INSERTION OF TISSUE EXPANDER;  Surgeon: Radha Guevara MD;  Location: [facility];  Service: Plastics;  Laterality: Left;   • COLONOSCOPY     • CYST REMOVAL      HAND   • DIAGNOSTIC LAPAROSCOPY     • ENDOSCOPY     • HYSTERECTOMY     • INSERTION OF BREAST TISSUE EXPANDER Bilateral    • KNEE SURGERY Left    • MASTECTOMY W/ SENTINEL NODE BIOPSY Bilateral 5/6/2020    Procedure: bilateral total mastectomy, bilateral sentinel node biopsy;  Surgeon: Shreya Tim MD;  Location: [facility];  Service: General;  Laterality: Bilateral;   • MULTIPLE TOOTH EXTRACTIONS     • TOE SURGERY Right    • TUBAL ABDOMINAL LIGATION     • VENOUS ACCESS DEVICE (PORT) INSERTION AND REMOVAL         Family history notable for her daughter who had a breast abscess  Social history: She is  and lives in [place], [place].    No Known Allergies    Medication:  Antibiotics:  Anti-Infectives (From admission, onward)    Ordered     Dose/Rate Route  Frequency Start Stop    08/07/21 2328  vancomycin 1500 mg/500 mL 0.9% NS IVPB ([facility])     Ordering Provider: Adri Carreno APRN    18.5 mg/kg × 74.7 kg  over 90 Minutes Intravenous Every 24 Hours 08/08/21 1000 08/13/21 0959    08/07/21 2324  Pharmacy to dose vancomycin     Ordering Provider: Adri Carreno APRN     Does not apply Continuous PRN 08/07/21 2322 08/12/21 2321 08/07/21 2056  vancomycin 1500 mg/500 mL 0.9% NS IVPB ([facility])     Ordering Provider: Jesús Manuel PA    20 mg/kg × 74.7 kg Intravenous Once 08/07/21 2058 08/08/21 0006          Review of Systems  Pertinent items are noted in HPI, all other systems reviewed and negative    Objective     Physical Exam:   Vital Signs   Temp:  [96.8 °F (36 °C)-97.6 °F (36.4 °C)] 97 °F (36.1 °C)  Heart Rate:  [] 109  Resp:  [16-18] 18  BP: (133-161)/(80-89) 161/80    GENERAL: Awake and alert, in no acute distress.   HEENT: Oropharynx is clear. Hearing is grossly normal.   EYES: PERRL. No conjunctival injection. No lid lag.   LYMPHATICS: No lymphadenopathy of the neck or inguinal regions.   HEART: Regular rate and regular rhythm. No peripheral edema.   LUNGS: dry rales with normal respiratory effort.   GI: Soft, nontender, nondistended. No appreciable organomegaly.   SKIN: Warm and dry with mild swelling of the left breast extending up into the axilla with purulent drainage from the lateral anterior portion  PSYCHIATRIC: Appropriate mood, affect, insight, and judgment.     Results Review:  Creatinine 1.1  Alk phos 136    AST 94  Hemoglobin 8  Platelets 219       Microbiology:  8/7 wound culture pending  8/7 blood cultures pending    Radiology:  PET CT scan from April 2020 showed no metastatic disease with interstitial changes in the medial upper lobes consistent with posttreatment changes    Assessment/Plan   1.  Left breast abscess  2.  Valvular heart disease, follows with Dr. Carroll and has normal ejection fraction and mild to moderate  mitral valve stenosis and moderate MR  3.  Pulmonary fibrosis, likely secondary to prior radiation  4.  Mild hepatitis    We will continue vancomycin for an AUC of 400-600 given prior isolated coag negative staph.  Surgical consultation is pending and we will decide based on their evaluation whether or not we need more imaging or I&D indicated.  We will plan to check the vancomycin trough to help guide antibiotic dosing on the a.m. of 8/10.      Thank you for this consult.  We will continue to follow along and tailor antibiotics as the patient's clinical course evolves.      Nik Monroy MD  08/08/21  09:43 EDT

## 2021-08-09 PROBLEM — D75.89 MACROCYTOSIS: Status: ACTIVE | Noted: 2021-08-09

## 2021-08-09 PROBLEM — R94.6 ABNORMAL THYROID FUNCTION TEST: Status: ACTIVE | Noted: 2021-08-09

## 2021-08-09 LAB
ALBUMIN SERPL-MCNC: 4.1 G/DL (ref 3.5–5.2)
ALBUMIN/GLOB SERPL: 1.5 G/DL
ALP SERPL-CCNC: 105 U/L (ref 39–117)
ALT SERPL W P-5'-P-CCNC: 140 U/L (ref 1–33)
ANION GAP SERPL CALCULATED.3IONS-SCNC: 8 MMOL/L (ref 5–15)
AST SERPL-CCNC: 75 U/L (ref 1–32)
BASOPHILS # BLD AUTO: 0.06 10*3/MM3 (ref 0–0.2)
BASOPHILS NFR BLD AUTO: 1.1 % (ref 0–1.5)
BILIRUB SERPL-MCNC: 0.8 MG/DL (ref 0–1.2)
BUN SERPL-MCNC: 12 MG/DL (ref 6–20)
BUN/CREAT SERPL: 15 (ref 7–25)
CALCIUM SPEC-SCNC: 9.2 MG/DL (ref 8.6–10.5)
CHLORIDE SERPL-SCNC: 102 MMOL/L (ref 98–107)
CO2 SERPL-SCNC: 29 MMOL/L (ref 22–29)
CORTIS SERPL-MCNC: 9.4 MCG/DL
CREAT SERPL-MCNC: 0.8 MG/DL (ref 0.57–1)
DEPRECATED RDW RBC AUTO: 50.7 FL (ref 37–54)
EOSINOPHIL # BLD AUTO: 0.25 10*3/MM3 (ref 0–0.4)
EOSINOPHIL NFR BLD AUTO: 4.5 % (ref 0.3–6.2)
ERYTHROCYTE [DISTWIDTH] IN BLOOD BY AUTOMATED COUNT: 13 % (ref 12.3–15.4)
FOLATE SERPL-MCNC: >20 NG/ML (ref 4.78–24.2)
GFR SERPL CREATININE-BSD FRML MDRD: 77 ML/MIN/1.73
GLOBULIN UR ELPH-MCNC: 2.7 GM/DL
GLUCOSE SERPL-MCNC: 100 MG/DL (ref 65–99)
HCT VFR BLD AUTO: 37.4 % (ref 34–46.6)
HGB BLD-MCNC: 13.1 G/DL (ref 12–15.9)
LYMPHOCYTES # BLD AUTO: 1.15 10*3/MM3 (ref 0.7–3.1)
LYMPHOCYTES NFR BLD AUTO: 20.8 % (ref 19.6–45.3)
MCH RBC QN AUTO: 37.2 PG (ref 26.6–33)
MCHC RBC AUTO-ENTMCNC: 35 G/DL (ref 31.5–35.7)
MCV RBC AUTO: 106.3 FL (ref 79–97)
MONOCYTES # BLD AUTO: 0.33 10*3/MM3 (ref 0.1–0.9)
MONOCYTES NFR BLD AUTO: 6 % (ref 5–12)
NEUTROPHILS NFR BLD AUTO: 3.72 10*3/MM3 (ref 1.7–7)
NEUTROPHILS NFR BLD AUTO: 67.2 % (ref 42.7–76)
PLATELET # BLD AUTO: 179 10*3/MM3 (ref 140–450)
PMV BLD AUTO: 10.2 FL (ref 6–12)
POTASSIUM SERPL-SCNC: 4.8 MMOL/L (ref 3.5–5.2)
PROT SERPL-MCNC: 6.8 G/DL (ref 6–8.5)
RBC # BLD AUTO: 3.52 10*6/MM3 (ref 3.77–5.28)
SODIUM SERPL-SCNC: 139 MMOL/L (ref 136–145)
T4 FREE SERPL-MCNC: 1.02 NG/DL (ref 0.93–1.7)
VIT B12 BLD-MCNC: 459 PG/ML (ref 211–946)
WBC # BLD AUTO: 5.53 10*3/MM3 (ref 3.4–10.8)

## 2021-08-09 PROCEDURE — 84439 ASSAY OF FREE THYROXINE: CPT | Performed by: INTERNAL MEDICINE

## 2021-08-09 PROCEDURE — 25010000002 VANCOMYCIN 10 G RECONSTITUTED SOLUTION: Performed by: NURSE PRACTITIONER

## 2021-08-09 PROCEDURE — 84480 ASSAY TRIIODOTHYRONINE (T3): CPT | Performed by: INTERNAL MEDICINE

## 2021-08-09 PROCEDURE — 80053 COMPREHEN METABOLIC PANEL: CPT | Performed by: INTERNAL MEDICINE

## 2021-08-09 PROCEDURE — 99232 SBSQ HOSP IP/OBS MODERATE 35: CPT | Performed by: INTERNAL MEDICINE

## 2021-08-09 PROCEDURE — 25010000002 HYDROMORPHONE PER 4 MG: Performed by: NURSE PRACTITIONER

## 2021-08-09 PROCEDURE — 94799 UNLISTED PULMONARY SVC/PX: CPT

## 2021-08-09 PROCEDURE — 25010000002 ONDANSETRON PER 1 MG: Performed by: NURSE PRACTITIONER

## 2021-08-09 PROCEDURE — 82533 TOTAL CORTISOL: CPT | Performed by: INTERNAL MEDICINE

## 2021-08-09 PROCEDURE — 84481 FREE ASSAY (FT-3): CPT | Performed by: INTERNAL MEDICINE

## 2021-08-09 PROCEDURE — 82746 ASSAY OF FOLIC ACID SERUM: CPT | Performed by: INTERNAL MEDICINE

## 2021-08-09 PROCEDURE — 85025 COMPLETE CBC W/AUTO DIFF WBC: CPT | Performed by: INTERNAL MEDICINE

## 2021-08-09 PROCEDURE — 82607 VITAMIN B-12: CPT | Performed by: INTERNAL MEDICINE

## 2021-08-09 RX ADMIN — CETIRIZINE HYDROCHLORIDE 10 MG: 10 TABLET ORAL at 09:25

## 2021-08-09 RX ADMIN — ACETAMINOPHEN 650 MG: 325 TABLET, FILM COATED ORAL at 07:51

## 2021-08-09 RX ADMIN — HYDROMORPHONE HYDROCHLORIDE 0.5 MG: 1 INJECTION, SOLUTION INTRAMUSCULAR; INTRAVENOUS; SUBCUTANEOUS at 13:40

## 2021-08-09 RX ADMIN — HYDROMORPHONE HYDROCHLORIDE 0.5 MG: 1 INJECTION, SOLUTION INTRAMUSCULAR; INTRAVENOUS; SUBCUTANEOUS at 05:20

## 2021-08-09 RX ADMIN — PANTOPRAZOLE SODIUM 40 MG: 40 TABLET, DELAYED RELEASE ORAL at 06:59

## 2021-08-09 RX ADMIN — ONDANSETRON 4 MG: 2 INJECTION INTRAMUSCULAR; INTRAVENOUS at 07:46

## 2021-08-09 RX ADMIN — FAMOTIDINE 20 MG: 20 TABLET, FILM COATED ORAL at 09:25

## 2021-08-09 RX ADMIN — SODIUM CHLORIDE, PRESERVATIVE FREE 10 ML: 5 INJECTION INTRAVENOUS at 23:22

## 2021-08-09 RX ADMIN — VANCOMYCIN HYDROCHLORIDE 1500 MG: 10 INJECTION, POWDER, LYOPHILIZED, FOR SOLUTION INTRAVENOUS at 11:36

## 2021-08-09 RX ADMIN — BUDESONIDE AND FORMOTEROL FUMARATE DIHYDRATE 2 PUFF: 160; 4.5 AEROSOL RESPIRATORY (INHALATION) at 21:37

## 2021-08-09 RX ADMIN — HYDROMORPHONE HYDROCHLORIDE 0.5 MG: 1 INJECTION, SOLUTION INTRAMUSCULAR; INTRAVENOUS; SUBCUTANEOUS at 18:36

## 2021-08-09 RX ADMIN — SODIUM CHLORIDE, PRESERVATIVE FREE 10 ML: 5 INJECTION INTRAVENOUS at 09:26

## 2021-08-09 RX ADMIN — ANASTROZOLE 1 MG: 1 TABLET ORAL at 09:25

## 2021-08-09 RX ADMIN — HYDROMORPHONE HYDROCHLORIDE 0.5 MG: 1 INJECTION, SOLUTION INTRAMUSCULAR; INTRAVENOUS; SUBCUTANEOUS at 21:11

## 2021-08-09 RX ADMIN — OXYCODONE HYDROCHLORIDE AND ACETAMINOPHEN 250 MG: 500 TABLET ORAL at 09:25

## 2021-08-09 RX ADMIN — DIAZEPAM 2 MG: 2 TABLET ORAL at 21:11

## 2021-08-09 RX ADMIN — HYDROMORPHONE HYDROCHLORIDE 0.5 MG: 1 INJECTION, SOLUTION INTRAMUSCULAR; INTRAVENOUS; SUBCUTANEOUS at 09:25

## 2021-08-09 RX ADMIN — Medication 500 MG: at 09:25

## 2021-08-09 RX ADMIN — METOPROLOL SUCCINATE 25 MG: 25 TABLET, EXTENDED RELEASE ORAL at 09:25

## 2021-08-09 RX ADMIN — PANTOPRAZOLE SODIUM 40 MG: 40 TABLET, DELAYED RELEASE ORAL at 16:38

## 2021-08-09 RX ADMIN — HYDROMORPHONE HYDROCHLORIDE 0.5 MG: 1 INJECTION, SOLUTION INTRAMUSCULAR; INTRAVENOUS; SUBCUTANEOUS at 16:38

## 2021-08-09 RX ADMIN — DESVENLAFAXINE SUCCINATE 50 MG: 50 TABLET, EXTENDED RELEASE ORAL at 21:12

## 2021-08-09 RX ADMIN — HYDROMORPHONE HYDROCHLORIDE 0.5 MG: 1 INJECTION, SOLUTION INTRAMUSCULAR; INTRAVENOUS; SUBCUTANEOUS at 23:23

## 2021-08-09 RX ADMIN — HYDROMORPHONE HYDROCHLORIDE 0.5 MG: 1 INJECTION, SOLUTION INTRAMUSCULAR; INTRAVENOUS; SUBCUTANEOUS at 11:42

## 2021-08-09 RX ADMIN — BUDESONIDE AND FORMOTEROL FUMARATE DIHYDRATE 2 PUFF: 160; 4.5 AEROSOL RESPIRATORY (INHALATION) at 09:58

## 2021-08-09 RX ADMIN — HYDROMORPHONE HYDROCHLORIDE 0.5 MG: 1 INJECTION, SOLUTION INTRAMUSCULAR; INTRAVENOUS; SUBCUTANEOUS at 00:25

## 2021-08-09 NOTE — PROGRESS NOTES
INFECTIOUS DISEASES PROGRESS NOTE    CC: f/u abscess    S:   Still with left breast leakage and tenderness but no f/c/ns    O:  Physical Exam:  Temp:  [96.5 °F (35.8 °C)-97.8 °F (36.6 °C)] 97.3 °F (36.3 °C)  Heart Rate:  [91-99] 91  Resp:  [16-18] 16  BP: (144-168)/(77-94) 168/94  Physical Exam  Pulmonary:      Effort: Pulmonary effort is normal.   Skin:     General: Skin is warm and dry.      Comments: Swelling over l breast with saturated dressing   Neurological:      Mental Status: She is alert.     Diagnostics:    WBC 5  Cr 0.8  Wound cx: NGTD  BCx NGTD    Assessment/Plan   1.  Left breast abscess  2.  Valvular heart disease, follows with Dr. Carroll and has normal ejection fraction and mild to moderate mitral valve stenosis and moderate MR  3.  Pulmonary fibrosis, likely secondary to prior radiation  4.  Mild hepatitis     Wound cx ngtd.  D/w Dr Guevara and planning I and D   Cont vanc and will tailor based on cx    Nik Monroy MD  10:26 EDT  08/09/21

## 2021-08-09 NOTE — PLAN OF CARE
Goal Outcome Evaluation:  Plan of Care Reviewed With: patient        Progress: improving    Dressing on left breast has remained dry and intact this shift. Dressing was changed by day shift nurse at last change of shift. Pt has requested PRN dilaudid 3x this shift. Awaiting surgery consult.

## 2021-08-09 NOTE — PROGRESS NOTES
Name: Jina Huddleston ADMIT: 2021   : 1975  PCP: Dalton Salinas APRN    MRN: [id number] LOS: 2 days   AGE/SEX: 46 y.o. female  ROOM: [facility]     Subjective   Subjective   Mild decrease in pain/swelling/discharge from the left breast.  No fever/chills/night sweats    Review of Systems    Cardiovascular/respiratory.  No chest pain.  Positive shortness of breath.  No cough.  No wheeze.  No hemoptysis.  GI.  Positive occasional nausea.  No vomiting.  No abdominal pain.  No bowel movement since admission.  .  No dysuria or hematuria.     Objective   Objective   Vital Signs  Temp:  [96.6 °F (35.9 °C)-98.2 °F (36.8 °C)] 97.5 °F (36.4 °C)  Heart Rate:  [87-96] 96  Resp:  [16-18] 16  BP: (144-168)/(78-94) 148/92  SpO2:  [95 %-100 %] 100 %  on  Flow (L/min):  [2] 2;   Device (Oxygen Therapy): nasal cannula    Intake/Output Summary (Last 24 hours) at 2021 1551  Last data filed at 2021 1324  Gross per 24 hour   Intake 500 ml   Output 1900 ml   Net -1400 ml     Body mass index is 30.54 kg/m².      21  0040   Weight: 80.7 kg (177 lb 14.6 oz)     Physical Exam    General.  Middle-aged female.  Alert and oriented x3.  In no pain or distress eyes.  Pupils equal round and reactive.  Intact extraocular musculature.  No pallor or jaundice.  Normal conjunctivae and lids.  Oral cavity.  Moist mucous membrane.  Neck.  Supple.  No JVD.  No lymphadenopathy or thyromegaly.  Cardiovascular.  Regular rate and rhythm.  Grade 2 systolic murmur.  Chest.  Clear bilateral air entry with no added sounds.  Breasts.  Scars both breasts.  Expanders in place.  Dressed left breast wound today.  No axillary lymphadenopathy.  Abdomen.  Soft lax.  No tenderness.  No organomegaly.  No guarding or rebound.  Extremities.  No clubbing cyanosis or edema.    Results Review:      Results from last 7 days   Lab Units 21  0723 21  0949 21  2104   SODIUM mmol/L 139 140 138   POTASSIUM mmol/L 4.8 3.9 4.1    CHLORIDE mmol/L 102 105 100   CO2 mmol/L 29.0 23.4 25.1   BUN mg/dL 12 15 15   CREATININE mg/dL 0.80 0.93 1.10*   GLUCOSE mg/dL 100* 91 83   CALCIUM mg/dL 9.2 8.9 9.9   AST (SGOT) U/L 75* 87* 94*   ALT (SGPT) U/L 140* 152* 162*     Estimated Creatinine Clearance: 88.4 mL/min (by C-G formula based on SCr of 0.8 mg/dL).                  Results from last 7 days   Lab Units 08/08/21  0949   TSH uIU/mL 7.240*           Invalid input(s):  PHOS        Invalid input(s): LDLCALC  Results from last 7 days   Lab Units 08/09/21  0723 08/08/21  0948 08/08/21  0948 08/07/21  2104 08/07/21  2104   WBC 10*3/mm3 5.53  --  5.64  --  8.03   HEMOGLOBIN g/dL 13.1  --  14.6  --  15.7   HEMATOCRIT % 37.4  --  43.0  --  44.4   PLATELETS 10*3/mm3 179  --  200  --  219   MCV fL 106.3*   < > 107.2*   < > 107.5*   MCH pg 37.2*   < > 36.4*   < > 38.0*   MCHC g/dL 35.0   < > 34.0   < > 35.4   RDW % 13.0   < > 13.0   < > 13.4   RDW-SD fl 50.7   < > 51.0   < > 53.4   MPV fL 10.2   < > 10.3   < > 10.7   NEUTROPHIL % % 67.2   < > 57.5   < > 60.4   LYMPHOCYTE % % 20.8   < > 30.1   < > 28.5   MONOCYTES % % 6.0   < > 6.7   < > 7.1   EOSINOPHIL % % 4.5   < > 4.1   < > 2.9   BASOPHIL % % 1.1   < > 1.2   < > 0.9   IMM GRAN % %  --   --  0.4   < > 0.2   NEUTROS ABS 10*3/mm3 3.72   < > 3.24   < > 4.85   LYMPHS ABS 10*3/mm3 1.15   < > 1.70   < > 2.29   MONOS ABS 10*3/mm3 0.33   < > 0.38   < > 0.57   EOS ABS 10*3/mm3 0.25   < > 0.23   < > 0.23   BASOS ABS 10*3/mm3 0.06   < > 0.07   < > 0.07   IMMATURE GRANS (ABS) 10*3/mm3  --   --  0.02   < > 0.02   NRBC /100 WBC  --   --  0.0   < > 0.0    < > = values in this interval not displayed.     Results from last 7 days   Lab Units 08/08/21  0949   INR  0.95         Results from last 7 days   Lab Units 08/08/21  0948 08/07/21  2104   PROCALCITONIN ng/mL  --  0.04   LACTATE mmol/L 1.5 1.4     Results from last 7 days   Lab Units 08/07/21  2104   CRP mg/dL <0.30         Results from last 7 days   Lab Units  08/07/21 2207 08/07/21  2104   BLOODCX   --  No growth at 24 hours  No growth at 24 hours   WOUNDCX  No growth at 2 days  --                    Imaging:  Imaging Results (Last 24 Hours)     Procedure Component Value Units Date/Time    US Abdomen Limited [[id number]] Collected: 08/08/21 1935     Updated: 08/09/21 1128    Narrative:      LIMITED ULTRASOUND EXAMINATION OF THE ABDOMEN     CLINICAL HISTORY: Elevated liver function tests.     FINDINGS:       Ultrasound examination of the right upper quadrant demonstrates a liver  parenchyma of homogeneous echotexture. There is a simple cyst within the  right lobe of the liver measuring up to 5.2 x 3.9 x 5.1 cm. There is no  evidence for intra or extrahepatic ductal dilatation. The common duct  measures 3 mm in diameter which is within normal limits. There is no  evidence for cholelithiasis or cholecystitis. The pancreas is not well  visualized but appears unremarkable within its visualized portions. The  right kidney measures up to 11.1 cm in long axis and has normal  corticomedullary differentiation. There is no evidence for  hydronephrosis.       Impression:         A 5.2 cm simple cyst within the right lobe of the liver.     No evidence for ductal dilatation, cholelithiasis, or cholecystitis.     This report was finalized on 8/9/2021 11:25 AM by Dr. Alon Barron M.D.                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           · Left breast abscess in a patient with a history of left breast cancer s/p bilateral mastectomy and left breast expander insertion.  Patient with a previous left breast expander infection with MRSA infection status post removal.  Lactic acid and pro calcitonin are normal.  No clinical evidence of sepsis with no fever and normal white count.  Wound cultures and blood cultures are negative till now..  ID following.  Currently on IV vancomycin.  Surgical consultation is pending and she might have to have a removal of the left breast expander.  Continue Arimidex.  · History of Hodgkin's lymphoma/macrocytosis..  Normal CBC except macrocytosis.  Normal  B12/folate/cortisol.  Elevated TSH (look below)   · Elevated TSH.  Will check total T3 and free T4.  · History of chronic diastolic congestive heart failure/MS/MR.  Does not appear to have angina or congestive heart failure at this time.  Positive dyspnea.  Appears euvolemic   · Transaminitis.  Hepatitis panel is negative.  Benign GI examination.  Right upper quadrant ultrasound was negative.  Mostly medication use.  Will monitor.  · Radiation pulmonary fibrosis.  Stable respiratory status.  · Acute kidney failure.  Mostly prerenal azotemia.  This has resolved with IV fluid.  Patient euvolemic.  IV fluid DC'd.  Lasix on hold at this time.  Stable renal function.    · VTE prophylaxis.  Sequential compression devices.      · Discussed discussed my findings and plan of treatment with the patient.      Tra Holcomb MD  [facility]  08/09/21  15:51 EDT

## 2021-08-09 NOTE — CASE MANAGEMENT/SOCIAL WORK
Discharge Planning Assessment  [facility]     Patient Name: Jina Huddleston  MRN: [id number]  Today's Date: 8/9/2021    Admit Date: 8/7/2021    Discharge Needs Assessment     Row Name 08/09/21 1536       Living Environment    Lives With  friend(s)    Name(s) of Who Lives With Patient  Mick Pete/friend [phone]    Current Living Arrangements  home/apartment/condo    Primary Care Provided by  self    Provides Primary Care For  friend(s)    Caregiving Concerns  States she and roommates care for each other.    Family Caregiver if Needed  friend(s);child(deya), adult    Family Caregiver Names  Aminata Mcneal/daughter [phone] or Mikc Pete/friend [phone]    Quality of Family Relationships  unable to assess    Able to Return to Prior Arrangements  yes    Living Arrangement Comments  Lives with roommates in a 1st floor apartment.       Resource/Environmental Concerns    Resource/Environmental Concerns  reliable transportation Difficulty scheduling appointment for 's license.    Transportation Concerns  rides, unreliable from others       Transition Planning    Patient/Family Anticipates Transition to  home    Patient/Family Anticipated Services at Transition  home health care    Transportation Anticipated  family or friend will provide       Discharge Needs Assessment    Readmission Within the Last 30 Days  no previous admission in last 30 days    Equipment Currently Used at Home  none    Concerns to be Addressed  discharge planning;basic needs;home safety    Anticipated Changes Related to Illness  none    Equipment Needed After Discharge  none    Discharge Facility/Level of Care Needs  home with home health    Provided Post Acute Provider List?  Yes    Post Acute Provider List  Home Health    Delivered To  Patient    Method of Delivery  In person    Patient's Choice of Community Agency(s)  Has used HH in the past but doesn't remember name of agency. Denies using SNF.    Discharge  Coordination/Progress  Spoke with patient at bedside regarding dc plans/needs. Confirmed information on facesheet.        Discharge Plan     Row Name 08/09/21 1548       Plan    Plan  Plans dc home assist of roommates    Patient/Family in Agreement with Plan  yes    Plan Comments  Spoke with patient at bedside regarding dc plans/needs. Plans dc home assist of roommates. No needs identified at this time. May need HH (dressing changes and/or IV abx)? Family/friend to transport at dc. Continue to follow....[facility]        Continued Care and Services - Admitted Since 8/7/2021    Coordination has not been started for this encounter.       Expected Discharge Date and Time     Expected Discharge Date Expected Discharge Time    Aug 12, 2021         Demographic Summary     Row Name 08/09/21 1536       General Information    Admission Type  inpatient    Referral Source  admission list    Reason for Consult  discharge planning        Functional Status     Row Name 08/09/21 1536       Functional Status    Usual Activity Tolerance  good    Current Activity Tolerance  good       Functional Status, IADL    Medications  independent    Meal Preparation  independent    Housekeeping  independent    Laundry  independent    Shopping  independent        Psychosocial    No documentation.       Abuse/Neglect    No documentation.       Legal    No documentation.       Substance Abuse    No documentation.       Patient Forms    No documentation.           Ami Singh, RN

## 2021-08-09 NOTE — PAYOR COMM NOTE
"INITIAL CLINICAL FOR REVIEW  ID #[id number]  F:  [phone]        Lew Shell (46 y.o. Female)     Date of Birth Social Security Number Address Home Phone MRN    1975  [address] [place] [id number] [phone] [id number]    Amish Marital Status          Jewish        Admission Date Admission Type Admitting Provider Attending Provider Department, Room/Bed    8/7/21 Emergency Winston Rojo MD Hussein, Samer H, MD [address], P388/1    Discharge Date Discharge Disposition Discharge Destination                       Attending Provider: Tra Holcomb MD    Allergies: No Known Allergies    Isolation: None   Infection: None   Code Status: CPR    Ht: 162.6 cm (64\")   Wt: 80.7 kg (177 lb 14.6 oz)    Admission Cmt: None   Principal Problem: Left breast abscess [N61.1]                 Active Insurance as of 8/7/2021     Primary Coverage     Payor Plan Insurance Group Employer/Plan Group    [facility] BY [facility] BY [id number]     Payor Plan Address Payor Plan Phone Number Payor Plan Fax Number Effective Dates    [address]   1/1/2021 - None Entered    [facility] [address]       Subscriber Name Subscriber Birth Date Member ID       LEW SHELL 1975 [id number]                 Emergency Contacts      (Rel.) Home Phone Work Phone Mobile Phone    ANNELISE HAMILTON (Daughter) [phone] -- --    MANUELA COVARRUBIAS (Significant Other) [phone] -- [phone]               History & Physical      Adri Carreno APRN at 08/07/21 2324     Attestation signed by Winston Rojo MD at 08/08/21 0038    Addendum: I have reviewed the history and plan as obtained by DUANE Harris. I have personally examined the patient. I have reviewed available clinical results, imaging results, EKG/Telemetry results, and prior records. My exam confirms her physical findings and I agree with the plan as listed above, with " the addition of the following:    Exam:  General AA NAD  HEENT NCAT EOMI  CV RRR, left breast swollen firm dressed with small amount of drainage present  Lungs CTA B  Abdomen ND NT  Extremity no cyanosis or edema  Neuro 2 through 12 grossly intact  Psych normal mood and affect    She has history of MRSA.  Has been started on vancomycin for empiric therapy and blood cultures and wound swab have been obtained by the ER.  Plan to consult her plastic surgeon and also infectious disease.  She states she does have spacer in place and had just received cardiac clearance for implantation however that is now on hold.    Patient seen prior to midnight on 8/7.  Note delayed by other patient care.    Winston Rojo MD  [facility]  08/08/21  00:35 EDT    Dictated portions using Dragon dictation software.  During the entire encounter, I was wearing recommended PPE including face mask and eye protection. Hand sanitization was performed prior to entering room and upon exit.                         Patient Name:  Jina Huddleston  YOB: 1975  MRN:  [id number]  Admit Date:  8/7/2021  Patient Care Team:  Dalton Salinas APRN as PCP - General (Family Medicine)      Subjective   History Present Illness     Chief Complaint   Patient presents with   • Wound Check     History of Present Illness   Mrs. Huddleston is a 46-year-old female with history of Hodgkin lymphoma, left breast cancer, valvular heart disease, chronic diastolic heart failure, GERD, mastectomy bilaterally who presents to the emergency room with left breast drainage.  Patient has had a bilateral mastectomy and has had multiple complications with her left breast expander.  Patient had expanders for quite a while, June 2020 she had a infection of her expander with and they were removed, they were left out for about 6 months, in January 2021 patient had expander placed again, she has been trying to get on the surgery schedule for  "breast implants, but has been hospitalized for shortness of breath, diagnosed with pulmonary fibrosis, also diagnosed with congestive heart failure recently and started on metoprolol and Lasix.  About 2 weeks patient stated that her expander had \"flipped and was in the wrong place\", but then shortly after waking up and finding it flipped it flipped back and then she noted a bruise on the lower part of her breast, that developed into a dry scabbed feeling area, then she developed some redness at the incision site, she noted some swelling and tenderness in her breast, this morning she woke up with a large amount of yellow drainage on her shirt.  Patient does have history of MRSA in her foot.  -In the emergency room vital signs were normal, temp 96.8, heart rate 99, respiratory 18, blood pressure 143/89, 99% on room air.  Glucose 83, sodium 130, potassium 4.1, creatinine 1.10, BUN 15, , AST 94, alkaline phosphatase 136, CRP less than 0.30, lactate 1.4, pro calcitonin 0.04, white blood cell count 8.03, hemoglobin 15.7, hematocrit 44.4, platelets 219.  Blood cultures and wound culture obtained in the emergency room, awaiting results.  Patient was started on vancomycin in the emergency room.    Review of Systems   Constitutional: Negative for appetite change and fever.   HENT: Negative for nosebleeds and trouble swallowing.    Eyes: Negative for photophobia, redness and visual disturbance.   Respiratory: Negative for cough, chest tightness, shortness of breath and wheezing.    Cardiovascular: Negative for chest pain, palpitations and leg swelling.   Gastrointestinal: Negative for abdominal distention, abdominal pain, nausea and vomiting.   Endocrine: Negative.    Genitourinary: Negative.    Musculoskeletal: Negative for gait problem and joint swelling.   Skin: Positive for wound (left breast wound, previous mastectomy).   Neurological: Negative for dizziness, seizures, speech difficulty, light-headedness and " headaches.   Hematological: Negative.    Psychiatric/Behavioral: Negative for behavioral problems and confusion.        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, estrogen receptor positive (CMS/HCC) 3/31/2020   • Drug therapy     For Hodgkin's Lymphoma   • GERD (gastroesophageal reflux disease)    • Heart murmur    • History of chemotherapy 1992   • Hodgkin lymphoma (CMS/HCC) 01/1992   • Hx of radiation therapy 1992    Chest wall for Hodgkin's   • Low back pain    • Marijuana use    • MRSA infection 10/2020    right little toe after a surgery  [facility]   • Neuropathy     FINGERS   • Nonrheumatic aortic valve insufficiency 1/1/1992   • Nonrheumatic mitral valve stenosis 1/1/1992     Past Surgical History:   Procedure Laterality Date   • BREAST ABSCESS INCISION AND DRAINAGE Left 7/14/2020    Procedure: INCISION AND DRAINAGE OF LEFT BREAST SEROMA,;  Surgeon: Radha Guevara MD;  Location: [facility];  Service: Plastics;  Laterality: Left;   • BREAST BIOPSY     • BREAST BIOPSY     • BREAST IMPLANT SURGERY Left 7/14/2020    Procedure: REMOVAL OF TISSUE EXPANDER;  Surgeon: Radha Guevara MD;  Location: [facility] OR;  Service: Plastics;  Laterality: Left;   • BREAST RECONSTRUCTION Bilateral 5/6/2020    Procedure: BILATERAL BREAST RECONSTRUCTION WITH TISSUE EXPANDERSAND ALLODERM;  Surgeon: Radha Guevara MD;  Location: [facility] OR;  Service: Plastics;  Laterality: Bilateral;   • BREAST TISSUE EXPANDER INSERTION Left 1/20/2021    Procedure: DELAYED LEFT BREAST INSERTION OF TISSUE EXPANDER;  Surgeon: Radha Guevara MD;  Location: [facility] OR;  Service: Plastics;  Laterality: Left;   • COLONOSCOPY     • CYST REMOVAL      HAND   • DIAGNOSTIC LAPAROSCOPY     • ENDOSCOPY     •  HYSTERECTOMY     • INSERTION OF BREAST TISSUE EXPANDER Bilateral    • KNEE SURGERY Left    • MASTECTOMY W/ SENTINEL NODE BIOPSY Bilateral 2020    Procedure: bilateral total mastectomy, bilateral sentinel node biopsy;  Surgeon: Shreya Tim MD;  Location: [facility] OR;  Service: General;  Laterality: Bilateral;   • MULTIPLE TOOTH EXTRACTIONS     • TOE SURGERY Right    • TUBAL ABDOMINAL LIGATION     • VENOUS ACCESS DEVICE (PORT) INSERTION AND REMOVAL       Family History   Problem Relation Age of Onset   • Cancer Maternal Uncle         UNKNOWN ORGIN    • Prostate cancer Maternal Uncle         UNKNOWN AGE    • Malig Hyperthermia Neg Hx      Social History     Tobacco Use   • Smoking status: Former Smoker     Packs/day: 0.50     Years: 33.00     Pack years: 16.50     Types: Cigarettes     Start date:      Quit date: 10/18/2020     Years since quittin.8   • Smokeless tobacco: Never Used   Vaping Use   • Vaping Use: Never used   Substance Use Topics   • Alcohol use: Not Currently     Comment: recovering alcoholic  and went back to drinking 2020 for a few months  now has stopped again   • Drug use: Yes     Frequency: 1.0 times per week     Types: Marijuana     Comment: edible      No current facility-administered medications on file prior to encounter.     Current Outpatient Medications on File Prior to Encounter   Medication Sig Dispense Refill   • albuterol (ACCUNEB) 1.25 MG/3ML nebulizer solution Take 1 ampule by nebulization Every 6 (Six) Hours As Needed for Wheezing.     • albuterol (PROAIR RESPICLICK) 108 (90 Base) MCG/ACT inhaler Inhale 2 puffs Every 4 (Four) Hours As Needed for Wheezing.     • anastrozole (ARIMIDEX) 1 MG tablet Take 1 mg by mouth Daily.     • Ascorbic Acid (Vitamin C) 100 MG chewable tablet Chew 500 mg Daily.     • Calcium Citrate-Vitamin D (CALCIUM + D PO) Take 1 tablet by mouth Every Morning.     • cetirizine (zyrTEC) 10 MG tablet Take 10 mg by mouth Daily. as  directed     • desvenlafaxine (PRISTIQ) 50 MG 24 hr tablet Take 50 mg by mouth Every Night.     • diazePAM (VALIUM) 2 MG tablet Take 2 mg by mouth 2 (Two) Times a Day As Needed for Anxiety.     • docosanol (ABREVA) 10 % cream cream Apply 1 application topically to the appropriate area as directed As Needed.     • famotidine (PEPCID) 20 MG tablet Take 20 mg by mouth Daily.     • furosemide (LASIX) 20 MG tablet Take 1 tablet by mouth Daily. 90 tablet 2   • meloxicam (MOBIC) 15 MG tablet Take 15 mg by mouth Daily.     • metoprolol succinate XL (Toprol XL) 25 MG 24 hr tablet Take 1 tablet by mouth 2 (two) times a day. 180 tablet 2   • pantoprazole (PROTONIX) 20 MG EC tablet Take 20 mg by mouth 2 (two) times a day.     • Symbicort 160-4.5 MCG/ACT inhaler Inhale 2 puffs 2 (Two) Times a Day.       No Known Allergies    Objective    Objective     Vital Signs  Temp:  [96.8 °F (36 °C)] 96.8 °F (36 °C)  Heart Rate:  [] 105  Resp:  [16-18] 16  BP: (143)/(89) 143/89  SpO2:  [98 %-99 %] 98 %  on   ;   Device (Oxygen Therapy): room air  There is no height or weight on file to calculate BMI.    Physical Exam  Vitals and nursing note reviewed.   Constitutional:       General: She is not in acute distress.     Appearance: She is well-developed.   HENT:      Head: Normocephalic.   Neck:      Vascular: No JVD.   Cardiovascular:      Rate and Rhythm: Normal rate and regular rhythm.      Comments: Normal sinus rhythm on the monitor, heart rate 78 during my exam.  No chest pain or shortness of breath.  No peripheral edema at this time  Pulmonary:      Effort: Pulmonary effort is normal.      Breath sounds: Normal breath sounds.      Comments: Lung sounds clear, sats 97% on room air  Chest:       Abdominal:      General: There is no distension.      Palpations: Abdomen is soft.      Tenderness: There is no abdominal tenderness.   Musculoskeletal:         General: Normal range of motion.      Cervical back: Normal range of motion.       Right lower leg: No edema.      Left lower leg: No edema.   Skin:     General: Skin is warm and dry.      Capillary Refill: Capillary refill takes less than 2 seconds.   Neurological:      Mental Status: She is alert and oriented to person, place, and time.   Psychiatric:         Behavior: Behavior normal.         Results Review:  I reviewed the patient's new clinical results.  I reviewed the patient's new imaging results and agree with the interpretation.  I reviewed the patient's other test results and agree with the interpretation  I personally viewed and interpreted the patient's EKG/Telemetry data  Discussed with ED provider.    Lab Results (last 24 hours)     Procedure Component Value Units Date/Time    CBC & Differential [[id number]]  (Abnormal) Collected: 08/07/21 2104    Specimen: Blood Updated: 08/07/21 2154    Narrative:      The following orders were created for panel order CBC & Differential.  Procedure                               Abnormality         Status                     ---------                               -----------         ------                     CBC Auto Differential[[id number]]        Abnormal            Final result               Scan Slide[[id number]]                                       Final result                 Please view results for these tests on the individual orders.    Comprehensive Metabolic Panel [[id number]]  (Abnormal) Collected: 08/07/21 2104    Specimen: Blood Updated: 08/07/21 2158     Glucose 83 mg/dL      BUN 15 mg/dL      Creatinine 1.10 mg/dL      Sodium 138 mmol/L      Potassium 4.1 mmol/L      Comment: Specimen hemolyzed.  Results may be affected.        Chloride 100 mmol/L      CO2 25.1 mmol/L      Calcium 9.9 mg/dL      Total Protein 7.7 g/dL      Albumin 4.60 g/dL      ALT (SGPT) 162 U/L      Comment: Specimen hemolyzed.  Results may be affected.        AST (SGOT) 94 U/L      Comment: Specimen hemolyzed.  Results may be affected.        Alkaline Phosphatase  "136 U/L      Total Bilirubin 0.8 mg/dL      eGFR Non African Amer 53 mL/min/1.73      Globulin 3.1 gm/dL      A/G Ratio 1.5 g/dL      BUN/Creatinine Ratio 13.6     Anion Gap 12.9 mmol/L     Narrative:      GFR Normal >60  Chronic Kidney Disease <60  Kidney Failure <15      Blood Culture - Blood, Arm, Left [[id number]] Collected: 08/07/21 2104    Specimen: Blood from Arm, Left Updated: 08/07/21 2121    Blood Culture - Blood, Arm, Left [[id number]] Collected: 08/07/21 2104    Specimen: Blood from Arm, Left Updated: 08/07/21 2120    Lactic Acid, Plasma [[id number]]  (Normal) Collected: 08/07/21 2104    Specimen: Blood Updated: 08/07/21 2135     Lactate 1.4 mmol/L     Procalcitonin [[id number]]  (Normal) Collected: 08/07/21 2104    Specimen: Blood Updated: 08/07/21 2150     Procalcitonin 0.04 ng/mL     Narrative:      As a Marker for Sepsis (Non-Neonates):     1. <0.5 ng/mL represents a low risk of severe sepsis and/or septic shock.  2. >2 ng/mL represents a high risk of severe sepsis and/or septic shock.    As a Marker for Lower Respiratory Tract Infections that require antibiotic therapy:  PCT on Admission     Antibiotic Therapy             6-12 Hrs later  >0.5                          Strongly Recommended            >0.25 - <0.5             Recommended  0.1 - 0.25                  Discouraged                       Remeasure/reassess PCT  <0.1                         Strongly Discouraged         Remeasure/reassess PCT      As 28 day mortality risk marker: \"Change in Procalcitonin Result\" (>80% or <=80%) if Day 0 (or Day 1) and Day 4 values are available. Refer to http://www.[employer]-pct-calculator.com/    Change in PCT <=80 %   A decrease of PCT levels below or equal to 80% defines a positive change in PCT test result representing a higher risk for 28-day all-cause mortality of patients diagnosed with severe sepsis or septic shock.    Change in PCT >80 %   A decrease of PCT levels of more than 80% defines a negative change " in PCT result representing a lower risk for 28-day all-cause mortality of patients diagnosed with severe sepsis or septic shock.              Results may be falsely decreased if patient taking Biotin.     C-reactive Protein [[id number]]  (Normal) Collected: 08/07/21 2104    Specimen: Blood Updated: 08/07/21 2158     C-Reactive Protein <0.30 mg/dL     CBC Auto Differential [[id number]]  (Abnormal) Collected: 08/07/21 2104    Specimen: Blood Updated: 08/07/21 2154     WBC 8.03 10*3/mm3      RBC 4.13 10*6/mm3      Hemoglobin 15.7 g/dL      Hematocrit 44.4 %      .5 fL      MCH 38.0 pg      MCHC 35.4 g/dL      RDW 13.4 %      RDW-SD 53.4 fl      MPV 10.7 fL      Platelets 219 10*3/mm3      Neutrophil % 60.4 %      Lymphocyte % 28.5 %      Monocyte % 7.1 %      Eosinophil % 2.9 %      Basophil % 0.9 %      Immature Grans % 0.2 %      Neutrophils, Absolute 4.85 10*3/mm3      Lymphocytes, Absolute 2.29 10*3/mm3      Monocytes, Absolute 0.57 10*3/mm3      Eosinophils, Absolute 0.23 10*3/mm3      Basophils, Absolute 0.07 10*3/mm3      Immature Grans, Absolute 0.02 10*3/mm3      nRBC 0.0 /100 WBC     Scan Slide [[id number]] Collected: 08/07/21 2104    Specimen: Blood Updated: 08/07/21 2154     Anisocytosis Slight/1+     Macrocytes Slight/1+     WBC Morphology Normal     Platelet Morphology Normal    COVID PRE-OP / PRE-PROCEDURE SCREENING ORDER (NO ISOLATION) - Swab, Nasopharynx [[id number]] Collected: 08/07/21 2106    Specimen: Swab from Nasopharynx Updated: 08/07/21 2134    Narrative:      The following orders were created for panel order COVID PRE-OP / PRE-PROCEDURE SCREENING ORDER (NO ISOLATION) - Swab, Nasopharynx.  Procedure                               Abnormality         Status                     ---------                               -----------         ------                     COVID-19,APTIMA PANTHER,...[[id number]]                      In process                   Please view results for these tests on the  individual orders.    COVID-19,APTIMA HERBERT LISAU IN-HOUSE, NP/OP SWAB IN UTM/VTM/SALINE TRANSPORT MEDIA,24 HR TAT - Swab, Nasopharynx [[id number]] Collected: 08/07/21 2106    Specimen: Swab from Nasopharynx Updated: 08/07/21 2134    Wound Culture - Wound, Breast, Left [[id number]] Collected: 08/07/21 2207    Specimen: Wound from Breast, Left Updated: 08/07/21 2213          Imaging Results (Last 24 Hours)     ** No results found for the last 24 hours. **          Results for orders placed during the hospital encounter of 07/27/21    Adult Transthoracic Echo Complete W/ Cont if Necessary Per Protocol    Interpretation Summary  · Estimated left ventricular EF = 60% Left ventricular systolic function is normal.  · There is mild, bileaflet mitral valve thickening present.  · Moderate mitral valve regurgitation is present.  · Mild to moderate mitral valve stenosis is present.  · Mild to moderate aortic valve regurgitation is present.  · Abnormal global longitudinal LV strain (GLS) = -10.8%.      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 Huddleston is a 46-year-old female with history of Hodgkin lymphoma, left breast cancer, valvular heart disease, chronic diastolic heart failure, GERD, mastectomy bilaterally who presents  to the emergency room with left breast drainage.     Left breast abscess/history of MRSA/history of breast cancer/Hodgkin's lymphoma  -Patient started on vancomycin in the emergency room, will continue that pharmacy to dose  -Blood cultures and wound culture are pending  -Infectious disease consulted due to history of MRSA  -Plastic surgery consulted due to breast expander/infection  -Pain control overnight    Transaminitis  -ALT AST more elevated than prior study a year ago  -Acute hepatitis panel  -Patient denies any abdominal pain, nausea or vomiting  -CMP in a.m.    Valvular heart disease/heart failure/heart murmur  -Chronic conditions are stable at this time  -monitor for fluid volume overload  -Continue home medications      · I discussed the patient's findings and my recommendations with patient and ED provider.    VTE Prophylaxis - SCDs.  Code Status - Full code.       DUANE Wallace  [facility]  08/07/21  23:29 EDT      Electronically signed by Winston Rojo MD at 08/08/21 0038          Emergency Department Notes      Modesto Flores RN at 08/07/21 1722        Pt has sore on left breast having yellow drainage coming from breast. Pt states had lumpectomy on left side states has breast expander had flipped upside down before this started and flipped back talked to MD told to come to ER.     Electronically signed by Modesto Flores RN at 08/07/21 1723     Jesús Manuel PA at 08/07/21 2050           EMERGENCY DEPARTMENT ENCOUNTER    Room Number:  02/02  Date of encounter:  8/7/2021  PCP: Dalton Salinas APRN  Historian: Patient      HPI:  Chief Complaint: Left breast infection      Context: Jina Huddleston is a 46 y.o. female with past medical history of breast cancer s/p double mastectomy, history of Hodgkin's lymphoma, CHF, pulmonary fibrosis who presents to the ED c/o infection of the left breast.  Patient states that about 5 days ago a wound opened up and has  had increased redness, pain, and swelling of the left breast with purulent drainage.  Denies any fever, chills, sweats, chest pain, or shortness of breath.  Patient states that she has had a double mastectomy as well as breast expanders and has had previous infection in the past with MRSA.      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, estrogen receptor positive (CMS/[facility]) 03/31/2020   • Breast pain, left 07/10/2020   • Tachycardia 07/11/2020   • Asthma 07/11/2020   • GERD without esophagitis 07/11/2020   • Dyspepsia 07/12/2020   • Diarrhea 07/12/2020   • Murmur, heart 07/12/2020   • Seroma of breast, left 07/12/2020   • Valvular heart disease 07/13/2020   • Acquired absence of left breast 01/20/2021   • Hx of radiation therapy 1992   • Hodgkin lymphoma (CMS/[facility]) 01/1992   • History of chemotherapy 1992   • Nonrheumatic mitral valve stenosis 1992   • Nonrheumatic mitral valve regurgitation 1992   • Nonrheumatic aortic valve insufficiency 1992     Resolved Ambulatory Problems     Diagnosis Date Noted   • No Resolved Ambulatory Problems     Past Medical History:   Diagnosis Date   • Asthma, exercise induced    • Back pain    • Bipolar I disorder (CMS/[facility])    • Drug therapy    • GERD (gastroesophageal reflux disease)    • Heart murmur    • Low back pain    • Marijuana use    • MRSA infection 10/2020   • Neuropathy          PAST SURGICAL HISTORY  Past Surgical History:   Procedure Laterality Date   • BREAST ABSCESS INCISION AND DRAINAGE Left 7/14/2020    Procedure: INCISION AND DRAINAGE OF LEFT BREAST SEROMA,;  Surgeon: Radha Guevara MD;  Location: [facility];  Service: Plastics;  Laterality: Left;   • BREAST BIOPSY     • BREAST BIOPSY     • BREAST IMPLANT SURGERY Left 7/14/2020    Procedure: REMOVAL OF TISSUE EXPANDER;  Surgeon: Radha Guevara MD;  Location: [facility] OR;  Service: Plastics;  Laterality:  Left;   • BREAST RECONSTRUCTION Bilateral 2020    Procedure: BILATERAL BREAST RECONSTRUCTION WITH TISSUE EXPANDERSAND ALLODERM;  Surgeon: Radha Guevara MD;  Location: [facility];  Service: Plastics;  Laterality: Bilateral;   • BREAST TISSUE EXPANDER INSERTION Left 2021    Procedure: DELAYED LEFT BREAST INSERTION OF TISSUE EXPANDER;  Surgeon: Radha Guevara MD;  Location: [facility] OR;  Service: Plastics;  Laterality: Left;   • COLONOSCOPY     • CYST REMOVAL      HAND   • DIAGNOSTIC LAPAROSCOPY     • ENDOSCOPY     • HYSTERECTOMY     • INSERTION OF BREAST TISSUE EXPANDER Bilateral    • KNEE SURGERY Left    • MASTECTOMY W/ SENTINEL NODE BIOPSY Bilateral 2020    Procedure: bilateral total mastectomy, bilateral sentinel node biopsy;  Surgeon: Shreya Tim MD;  Location: [facility];  Service: General;  Laterality: Bilateral;   • MULTIPLE TOOTH EXTRACTIONS     • TOE SURGERY Right    • TUBAL ABDOMINAL LIGATION     • VENOUS ACCESS DEVICE (PORT) INSERTION AND REMOVAL           FAMILY HISTORY  Family History   Problem Relation Age of Onset   • Cancer Maternal Uncle         UNKNOWN ORGIN    • Prostate cancer Maternal Uncle         UNKNOWN AGE    • Malig Hyperthermia Neg Hx          SOCIAL HISTORY  Social History     Socioeconomic History   • Marital status:      Spouse name: Not on file   • Number of children: Not on file   • Years of education: Not on file   • Highest education level: Not on file   Tobacco Use   • Smoking status: Former Smoker     Packs/day: 0.50     Years: 33.00     Pack years: 16.50     Types: Cigarettes     Start date:      Quit date: 10/18/2020     Years since quittin.8   • Smokeless tobacco: Never Used   Vaping Use   • Vaping Use: Never used   Substance and Sexual Activity   • Alcohol use: Not Currently     Comment: recovering alcoholic  and went back to drinking 2020 for a few months  now has stopped again   • Drug use: Yes      Frequency: 1.0 times per week     Types: Marijuana     Comment: edible          ALLERGIES  Patient has no known allergies.        REVIEW OF SYSTEMS  Review of Systems     All systems reviewed and negative except for those discussed in HPI.       PHYSICAL EXAM    I have reviewed the triage vital signs and nursing notes.    ED Triage Vitals [08/07/21 1723]   Temp Heart Rate Resp BP SpO2   96.8 °F (36 °C) 99 18 -- 99 %      Temp src Heart Rate Source Patient Position BP Location FiO2 (%)   Tympanic -- -- -- --       Physical Exam  GENERAL: Alert and oriented x3, not distressed  HENT: nares patent  EYES: no scleral icterus, PERRL, EOMI  CV: regular rhythm, regular rate, no murmurs, rubs, or gallops  RESPIRATORY: normal effort, clear to auscultate bilaterally  ABDOMEN: soft  MUSCULOSKELETAL: no deformity  NEURO: alert, moves all extremities, follows commands  SKIN: warm, dry        LAB RESULTS  Recent Results (from the past 24 hour(s))   Comprehensive Metabolic Panel    Collection Time: 08/07/21  9:04 PM    Specimen: Blood   Result Value Ref Range    Glucose 83 65 - 99 mg/dL    BUN 15 6 - 20 mg/dL    Creatinine 1.10 (H) 0.57 - 1.00 mg/dL    Sodium 138 136 - 145 mmol/L    Potassium 4.1 3.5 - 5.2 mmol/L    Chloride 100 98 - 107 mmol/L    CO2 25.1 22.0 - 29.0 mmol/L    Calcium 9.9 8.6 - 10.5 mg/dL    Total Protein 7.7 6.0 - 8.5 g/dL    Albumin 4.60 3.50 - 5.20 g/dL    ALT (SGPT) 162 (H) 1 - 33 U/L    AST (SGOT) 94 (H) 1 - 32 U/L    Alkaline Phosphatase 136 (H) 39 - 117 U/L    Total Bilirubin 0.8 0.0 - 1.2 mg/dL    eGFR Non African Amer 53 (L) >60 mL/min/1.73    Globulin 3.1 gm/dL    A/G Ratio 1.5 g/dL    BUN/Creatinine Ratio 13.6 7.0 - 25.0    Anion Gap 12.9 5.0 - 15.0 mmol/L   Lactic Acid, Plasma    Collection Time: 08/07/21  9:04 PM    Specimen: Blood   Result Value Ref Range    Lactate 1.4 0.5 - 2.0 mmol/L   Procalcitonin    Collection Time: 08/07/21  9:04 PM    Specimen: Blood   Result Value Ref Range    Procalcitonin  0.04 0.00 - 0.25 ng/mL   C-reactive Protein    Collection Time: 08/07/21  9:04 PM    Specimen: Blood   Result Value Ref Range    C-Reactive Protein <0.30 0.00 - 0.50 mg/dL   CBC Auto Differential    Collection Time: 08/07/21  9:04 PM    Specimen: Blood   Result Value Ref Range    WBC 8.03 3.40 - 10.80 10*3/mm3    RBC 4.13 3.77 - 5.28 10*6/mm3    Hemoglobin 15.7 12.0 - 15.9 g/dL    Hematocrit 44.4 34.0 - 46.6 %    .5 (H) 79.0 - 97.0 fL    MCH 38.0 (H) 26.6 - 33.0 pg    MCHC 35.4 31.5 - 35.7 g/dL    RDW 13.4 12.3 - 15.4 %    RDW-SD 53.4 37.0 - 54.0 fl    MPV 10.7 6.0 - 12.0 fL    Platelets 219 140 - 450 10*3/mm3    Neutrophil % 60.4 42.7 - 76.0 %    Lymphocyte % 28.5 19.6 - 45.3 %    Monocyte % 7.1 5.0 - 12.0 %    Eosinophil % 2.9 0.3 - 6.2 %    Basophil % 0.9 0.0 - 1.5 %    Immature Grans % 0.2 0.0 - 0.5 %    Neutrophils, Absolute 4.85 1.70 - 7.00 10*3/mm3    Lymphocytes, Absolute 2.29 0.70 - 3.10 10*3/mm3    Monocytes, Absolute 0.57 0.10 - 0.90 10*3/mm3    Eosinophils, Absolute 0.23 0.00 - 0.40 10*3/mm3    Basophils, Absolute 0.07 0.00 - 0.20 10*3/mm3    Immature Grans, Absolute 0.02 0.00 - 0.05 10*3/mm3    nRBC 0.0 0.0 - 0.2 /100 WBC   Scan Slide    Collection Time: 08/07/21  9:04 PM    Specimen: Blood   Result Value Ref Range    Anisocytosis Slight/1+ None Seen    Macrocytes Slight/1+ None Seen    WBC Morphology Normal Normal    Platelet Morphology Normal Normal       Ordered the above labs and independently reviewed the results.        RADIOLOGY  No Radiology Exams Resulted Within Past 24 Hours    I ordered the above noted radiological studies. Reviewed by me and discussed with radiologist.  See dictation for official radiology interpretation.      PROCEDURES    Procedures      MEDICATIONS GIVEN IN ER    Medications   sodium chloride 0.9 % flush 10 mL (has no administration in time range)   sodium chloride 0.9 % bolus 1,000 mL (1,000 mL Intravenous New Bag 8/7/21 7677)   vancomycin 1500 mg/500 mL 0.9% NS  IVPB ([facility]) (1,500 mg Intravenous New Bag 8/7/21 2218)   morphine injection 4 mg (4 mg Intravenous Given 8/7/21 2205)         PROGRESS, DATA ANALYSIS, CONSULTS, AND MEDICAL DECISION MAKING    All labs have been independently reviewed by me.  All radiology studies have been reviewed by me and discussed with radiologist dictating the report.   EKG's independently viewed and interpreted by me.  Discussion below represents my analysis of pertinent findings related to patient's condition, differential diagnosis, treatment plan and final disposition.    DDx: Includes but not limited to left breast cellulitis, left breast abscess, inflammatory breast cancer    ED Course as of Aug 07 2304   Sat Aug 07, 2021   2056 WBC: 8.03 [JESI]   2234 Hemoglobin: 15.7 [JESI]   2234 Platelets: 219 [JESI]   2234 Glucose: 83 [JESI]   2234 BUN: 15 [JESI]   2234 Creatinine(!): 1.10 [JESI]   2234 Sodium: 138 [JESI]   2234 Potassium: 4.1 [JESI]   2234 C-Reactive Protein: <0.30 [JESI]   2234 Procalcitonin: 0.04 [JESI]   2234 Lactate: 1.4 [JESI]   2244 Patient care discussed with Lisa ZEPEDA, will admit to Dr. Rojo.    [JESI]      ED Course User Index  [JESI] Jesús Manuel PA       MDM: Patient has infection of her left breast that has had previous MRSA infections and an infected tissue expander after a previous mastectomy. We have obtained blood cultures and start her on IV vancomycin and will admit for further evaluation and plastic surgery consultation.    PPE: Both the patient and I wore a surgical mask throughout the entire patient encounter. I wore protective goggles.     AS OF 23:04 EDT VITALS:    BP - 143/89  HR - 105  TEMP - 96.8 °F (36 °C) (Tympanic)  O2 SATS - 98%        DIAGNOSIS  Final diagnoses:   Left breast abscess   History of breast cancer   History of MRSA infection         DISPOSITION  ADMISSION    Discussed treatment plan and reason for admission with pt/family and admitting physician.  Pt/family voiced understanding of the plan for admission for  further testing/treatment as needed.              Jesús Manuel PA  08/07/21 2305      Electronically signed by Jesús Manuel PA at 08/07/21 2305     Shawn Mejias MD at 08/07/21 2058          MD ATTESTATION NOTE    The DIMITRI and I have discussed this patient's history, physical exam, and treatment plan.  I have reviewed the documentation and personally had a face to face interaction with the patient. I affirm the documentation and agree with the treatment and plan.  The attached note describes my personal findings.      Jina Huddleston is a 46 y.o. female who presents to the ED c/o drainage from her left breast.  She reports she has a history of a double mastectomy and has a wound expander in her left breast.  She states that her initial expander was placed in May 2020.  She reports that she developed a wound infection and had to have the expander removed.  She states that it was replaced.  5 days ago she developed pain and a wound to her left breast.  She reports since then it is gotten more painful and has been draining yellow fluid.  She called her plastic surgeon on Friday who put her on Bactrim and wanted to see her in the office on Monday.  She advised her to come to the emergency room if her swelling worsen.  She reports her swelling has worsened and she has developed a new open wound in the left breast as well.  She has a history of MRSA.      On exam:  GENERAL: Awake, alert, no acute distress  SKIN: Warm, dry  HENT: Normocephalic, atraumatic  EYES: no scleral icterus  CV: regular rhythm, regular rate  RESPIRATORY: normal effort, lungs clear, left breast with 2 open wounds with drainage of yellow purulence.  No significant erythema.  ABDOMEN: soft, non-tender, non-distended  MUSCULOSKELETAL: no deformity  NEURO: alert, moves all extremities, follows commands    Labs  No results found for this or any previous visit (from the past 24 hour(s)).    Radiology  No Radiology Exams Resulted Within Past 24  Hours    Medical Decision Making:       Plan IV antibiotics, laboratory values, admission.  Plan pain control.  Plan to cover MRSA.    PPE: Both the patient and I wore a surgical mask throughout the entire patient encounter. I wore protective goggles.     Diagnosis  Final diagnoses:   Left breast abscess        Shawn Mejias MD  08/07/21 2100      Electronically signed by Shawn Mejias MD at 08/07/21 2100     Luz Maria Yan, RN at 08/08/21 0005          .  Nursing report ED to floor  Jina Huddleston  46 y.o.  female    HPI (triage note):   Chief Complaint   Patient presents with   • Wound Check       Admitting doctor:   Winston Rojo MD    Admitting diagnosis:   The primary encounter diagnosis was Left breast abscess. Diagnoses of History of breast cancer and History of MRSA infection were also pertinent to this visit.    Code status:   Current Code Status     Date Active Code Status Order ID Comments User Context       8/7/2021 2329 CPR [id number]  Adri Carreno, APRN ED     Advance Care Planning Activity      Questions for Current Code Status     Question Answer Comment    Code Status CPR     Medical Interventions (Level of Support Prior to Arrest) Full           Allergies:   Patient has no known allergies.    Weight:   There were no vitals filed for this visit.    Most recent vitals:   Vitals:    08/07/21 1723 08/07/21 1949 08/07/21 2220   BP:   143/89   Pulse: 99 105    Resp: 18 16    Temp: 96.8 °F (36 °C)     TempSrc: Tympanic     SpO2: 99% 98%        Active LDAs/IV Access:   Lines, Drains & Airways    Active LDAs     Name:   Placement date:   Placement time:   Site:   Days:    Peripheral IV 08/07/21 2148 Right;Distal;Upper;Medial Arm   08/07/21 2148    Arm   less than 1    Closed/Suction Drain 1 Left Breast Bulb 15 Fr.   01/20/21    1616    Breast   199                Labs (abnormal labs have a star):   Labs Reviewed   COMPREHENSIVE METABOLIC PANEL - Abnormal; Notable for the following  "components:       Result Value    Creatinine 1.10 (*)     ALT (SGPT) 162 (*)     AST (SGOT) 94 (*)     Alkaline Phosphatase 136 (*)     eGFR Non  Amer 53 (*)     All other components within normal limits    Narrative:     GFR Normal >60  Chronic Kidney Disease <60  Kidney Failure <15     CBC WITH AUTO DIFFERENTIAL - Abnormal; Notable for the following components:    .5 (*)     MCH 38.0 (*)     All other components within normal limits   LACTIC ACID, PLASMA - Normal   PROCALCITONIN - Normal    Narrative:     As a Marker for Sepsis (Non-Neonates):     1. <0.5 ng/mL represents a low risk of severe sepsis and/or septic shock.  2. >2 ng/mL represents a high risk of severe sepsis and/or septic shock.    As a Marker for Lower Respiratory Tract Infections that require antibiotic therapy:  PCT on Admission     Antibiotic Therapy             6-12 Hrs later  >0.5                          Strongly Recommended            >0.25 - <0.5             Recommended  0.1 - 0.25                  Discouraged                       Remeasure/reassess PCT  <0.1                         Strongly Discouraged         Remeasure/reassess PCT      As 28 day mortality risk marker: \"Change in Procalcitonin Result\" (>80% or <=80%) if Day 0 (or Day 1) and Day 4 values are available. Refer to http://www.[employer]-pct-calculator.com/    Change in PCT <=80 %   A decrease of PCT levels below or equal to 80% defines a positive change in PCT test result representing a higher risk for 28-day all-cause mortality of patients diagnosed with severe sepsis or septic shock.    Change in PCT >80 %   A decrease of PCT levels of more than 80% defines a negative change in PCT result representing a lower risk for 28-day all-cause mortality of patients diagnosed with severe sepsis or septic shock.              Results may be falsely decreased if patient taking Biotin.    C-REACTIVE PROTEIN - Normal   COVID PRE-OP / PRE-PROCEDURE SCREENING ORDER (NO ISOLATION) "    Narrative:     The following orders were created for panel order COVID PRE-OP / PRE-PROCEDURE SCREENING ORDER (NO ISOLATION) - Swab, Nasopharynx.  Procedure                               Abnormality         Status                     ---------                               -----------         ------                     COVID-19,APTIMA PANTHER,...[[id number]]                      In process                   Please view results for these tests on the individual orders.   BLOOD CULTURE   BLOOD CULTURE   WOUND CULTURE   COVID-19,APTIMA PANTHER,[facility] IN-HOUSE,NP/OP SWAB IN UTM/VTM/SALINE TRANSPORT MEDIA,24 HR TAT   SCAN SLIDE   CBC WITH AUTO DIFFERENTIAL   PROTIME-INR   LACTIC ACID, PLASMA   HEPATITIS PANEL, ACUTE   COMPREHENSIVE METABOLIC PANEL   CBC AND DIFFERENTIAL    Narrative:     The following orders were created for panel order CBC & Differential.  Procedure                               Abnormality         Status                     ---------                               -----------         ------                     CBC Auto Differential[[id number]]        Abnormal            Final result               Scan Slide[[id number]]                                       Final result                 Please view results for these tests on the individual orders.       EKG:   No orders to display       Meds given in ED:   Medications   sodium chloride 0.9 % flush 10 mL (has no administration in time range)   sodium chloride 0.9 % flush 10 mL (has no administration in time range)   sodium chloride 0.9 % flush 10 mL (has no administration in time range)   Pharmacy to dose vancomycin (has no administration in time range)   acetaminophen (TYLENOL) tablet 650 mg (has no administration in time range)     Or   acetaminophen (TYLENOL) 160 MG/5ML solution 650 mg (has no administration in time range)     Or   acetaminophen (TYLENOL) suppository 650 mg (has no administration in time range)   HYDROmorphone (DILAUDID) injection 0.5 mg  (has no administration in time range)     And   naloxone (NARCAN) injection 0.4 mg (has no administration in time range)   ondansetron (ZOFRAN) injection 4 mg (has no administration in time range)   vancomycin 1500 mg/500 mL 0.9% NS IVPB ([facility]) (has no administration in time range)   sodium chloride 0.9 % infusion (has no administration in time range)   sodium chloride 0.9 % bolus 1,000 mL (1,000 mL Intravenous New Bag 21)   vancomycin 1500 mg/500 mL 0.9% NS IVPB ([facility]) (1,500 mg Intravenous New Bag 21)   morphine injection 4 mg (4 mg Intravenous Given 21)       Imaging results:  No radiology results for the last day    Ambulatory status:   - ad vitaly    Social issues:   Social History     Socioeconomic History   • Marital status:      Spouse name: Not on file   • Number of children: Not on file   • Years of education: Not on file   • Highest education level: Not on file   Tobacco Use   • Smoking status: Former Smoker     Packs/day: 0.50     Years: 33.00     Pack years: 16.50     Types: Cigarettes     Start date:      Quit date: 10/18/2020     Years since quittin.8   • Smokeless tobacco: Never Used   Vaping Use   • Vaping Use: Never used   Substance and Sexual Activity   • Alcohol use: Not Currently     Comment: recovering alcoholic  and went back to drinking 2020 for a few months  now has stopped again   • Drug use: Yes     Frequency: 1.0 times per week     Types: Marijuana     Comment: edible     Nursing report ED to floor       Luz Maria Yan RN  21 0005      Electronically signed by Luz Maria Yan RN at 21 0005       {Outbreak/Travel/Exposure Documentation......;  Question Available Choices Patient Response   COVID-19 Outbreak Screen:  Do you currently have a new onset of the following symptoms?        Fever/Chills, Cough, Shortness of air, Loss of taste or smell, No, Unknown  No (21 1724)   COVID-19 Outbreak Screen: In the last 14 days, have you had  contact with anyone who is ill, has show any of the symptoms listed above and/or has been diagnosis with the 2019 Novel Coronavirus? This includes any immediate household members but excludes any patients with whom you have been in contact within your normal work duties wearing proper PPE, if you are a healthcare worker.  Yes, No, Unknown              No (08/07/21 1724)   COVID-19 Outbreak Screen: Who was notified? Free text (not recorded)   Ebola Screening Outbreak Screen: Have you traveled to the [place] or [place] within the past 21 days?  Yes, No, Unknown (not recorded)   Ebola Screening Outbreak Screen: Do you have ANY of the following symptoms: Fever/Chills, Vomiting, Diarrhea, Fatigue, Headache, Muscle pain, Unexplained bleeding, Abdominal (stomach) pain, No, Unknown (not recorded)   Ebola Screening Outbreak Screen: Name of Person notified Free text (not recorded)   Travel Screen: Have you traveled in the last month? If so, to what country have you traveled? If [place] what state? Yes, No, Unknown  List of all countries  List of all States No (08/07/21 1724)  (not recorded)  (not recorded)   Infection Risk: Do you currently have the following symptoms?  (If cough is selected, the Tuberculosis Screen is performed.) Cough, Fever, Rash, No No (08/07/21 1724)   Tuberculosis Screen: Do you have any of the following Tuberculosis Risks?  · Have you lived or spent time with anyone who had or may have TB?  · Have you lived in or visited any of the following areas for more than one month: [place], [place], [place], [place] or [place], [place] or [place]?  · Do you have HIV/AIDS?  · Have you lived in or worked in a nursing home, homeless shelter, correctional facility, or substance abuse treatment facility?   · No    If Yes do you have any of the following symptoms? Yes responses display to the right    If Yes, symptoms listed are:  Cough greater than or equal to 3 weeks, Loss of  appetite, Unexplained weight loss, Night sweats, Bloody sputum or hemoptysis, Hoarseness, Fever, Fatigue, Chest pain, No (not recorded)  (not recorded)   Exposure Screen: Have you been exposed to any of these contagious diseases in the last month? Measles, Chickenpox, Meningitis, Pertussis, Whooping Cough, No No (08/07/21 1724)       Vital Signs (last day)     Date/Time   Temp   Temp src   Pulse   Resp   BP   Patient Position   SpO2    08/09/21 1000   --   --   91   16   --   --   --    08/09/21 0958   --   --   93   16   --   --   95    08/09/21 0727   97.3 (36.3)   Oral   96   --   168/94   Lying   97    08/09/21 0510   97.8 (36.6)   Oral   94   16   153/78   Lying   95    08/08/21 2149   --   --   94   16   --   --   99    08/08/21 1943   96.6 (35.9)   Oral   96   18   144/89   Lying   98    08/08/21 1512   97.2 (36.2)   Oral   99   18   153/77   Lying   92    08/08/21 1149   --   --   95   --   --   --   97    08/08/21 1117   96.5 (35.8)   Oral   97   18   150/93   Lying   100    08/08/21 0715   97 (36.1)   Oral   109   18   161/80   Lying   94    08/08/21 0441   97.5 (36.4)   Oral   97   18   133/84   Lying   96    08/08/21 0048   97.6 (36.4)   Oral   89   18   151/86   Sitting   98    08/08/21 0040   --   --   86   18   --   --   98    08/08/21 0011   --   --   --   --   140/80   --   97              Oxygen Therapy (last day)     Date/Time   SpO2   Device (Oxygen Therapy)   Flow (L/min)   Oxygen Concentration (%)   ETCO2 (mmHg)    08/09/21 0958   95   room air   --   --   --    08/09/21 0727   97   nasal cannula   2   --   --    08/09/21 0510   95   nasal cannula   2   --   --    08/08/21 2300   --   nasal cannula   2   --   --    08/08/21 2149   99   nasal cannula   2   --   --    08/08/21 1943   98   room air   --   --   --    08/08/21 1512   92   room air   --   --   --    08/08/21 1149   97   room air   --   --   --    08/08/21 1117   100   room air   --   --   --    08/08/21 0840   --   room air   --   --    --    08/08/21 0715   94   room air   --   --   --    08/08/21 0441   96   room air   --   --   --    08/08/21 0048   98   room air   --   --   --    08/08/21 0040   98   room air   --   --   --    08/08/21 0011   97   --   --   --   --              Lines, Drains & Airways    Active LDAs     Name:   Placement date:   Placement time:   Site:   Days:    Peripheral IV 08/07/21 2148 Right;Distal;Upper;Medial Arm   08/07/21 2148    Arm   1    Closed/Suction Drain 1 Left Breast Bulb 15 Fr.   01/20/21    1616    Breast   200                CIWA (since admission)     None        COWS (since admission)     None          Medication Administration Report for Jina Huddleston as of 08/09/21 1100    Legend:    Given Hold Not Given Due Canceled Entry Other Actions    Time Time (Time) Time  Time-Action       Discontinued     Completed     Future     MAR Hold     Linked           Medications 08/07/21 08/08/21 08/09/21    anastrozole (ARIMIDEX) tablet 1 mg  Dose: 1 mg  Freq: Daily Route: PO  Start: 08/08/21 0900    Admin Instructions:   Do Not Crush  HAZARDOUS - Handle with care      0832          0925             ascorbic acid (VITAMIN C) tablet 250 mg  Dose: 250 mg  Freq: Daily Route: PO  Start: 08/08/21 0900     0832          0925             budesonide-formoterol (SYMBICORT) 160-4.5 MCG/ACT inhaler 2 puff  Dose: 2 puff  Freq: 2 Times Daily Route: IN  Start: 08/08/21 0115    Admin Instructions:    Shake well.  Rinse mouth after use, do not swallow water.  Send aerosols to pharmacy in ziplock bag for proper disposal.      (4173) [C]     0099 [id number]            calcium carbonate (oyster shell) tablet 500 mg  Dose: 500 mg  Freq: Daily Route: PO  Start: 08/08/21 0900     0832          0925             cetirizine (zyrTEC) tablet 10 mg  Dose: 10 mg  Freq: Daily Route: PO  Start: 08/08/21 0900     0832          0925             desvenlafaxine (PRISTIQ) 24 hr tablet 50 mg  Dose: 50 mg  Freq: Nightly Route:  PO  Start: 08/08/21 0115    Admin Instructions:   Swallow whole. Do not crush, chew, or split.      0207 2012 2100             famotidine (PEPCID) tablet 20 mg  Dose: 20 mg  Freq: Daily Route: PO  Start: 08/08/21 0900     0833          0925             metoprolol succinate XL (TOPROL-XL) 24 hr tablet 25 mg  Dose: 25 mg  Freq: Every 24 Hours Scheduled Route: PO  Start: 08/08/21 0900    Admin Instructions:   Do not crush or chew.      0833          0925             pantoprazole (PROTONIX) EC tablet 40 mg  Dose: 40 mg  Freq: 2 Times Daily Before Meals Route: PO  Start: 08/08/21 0730    Admin Instructions:   Swallow whole; do not crush, split, or chew.      0443          1730        0659     1730            sodium chloride 0.9 % flush 10 mL  Dose: 10 mL  Freq: Every 12 Hours Scheduled Route: IV  Start: 08/07/21 2326     0039     0835     2032 0926 2100            vancomycin 1500 mg/500 mL 0.9% NS IVPB ([facility])  Dose: 18.5 mg/kg  Weight Dosing Info: 74.7 kg  Freq: Every 24 Hours Route: IV  Indications of Use: SKIN AND SOFT TISSUE INFECTION  Start: 08/08/21 1000   End: 08/13/21 0959     1109          1000            Completed Medications  Medications 08/07/21 08/08/21 08/09/21       morphine injection 4 mg  Dose: 4 mg  Freq: Once Route: IV  Start: 08/07/21 2102   End: 08/07/21 2205    Admin Instructions:   If given for pain, use the following pain scale:  Mild Pain = Pain Score of 1-3, CPOT 1-2  Moderate Pain = Pain Score of 4-6, CPOT 3-4  Severe Pain = Pain Score of 7-10, CPOT 5-8     2205               sodium chloride 0.9 % bolus 1,000 mL  Dose: 1,000 mL  Freq: Once Route: IV  Start: 08/07/21 2058   End: 08/08/21 0005 2209          0005              vancomycin 1500 mg/500 mL 0.9% NS IVPB ([facility])  Dose: 20 mg/kg  Weight Dosing Info: 74.7 kg  Freq: Once Route: IV  Indications of Use: SKIN AND SOFT TISSUE INFECTION  Start: 08/07/21 2058   End: 08/08/21 0006    2218          0006              Discontinued Medications  Medications 08/07/21 08/08/21 08/09/21       furosemide (LASIX) tablet 20 mg  Dose: 20 mg  Freq: Daily Route: PO  Start: 08/08/21 0900   End: 08/08/21 1213     0833              pantoprazole (PROTONIX) EC tablet 20 mg  Dose: 20 mg  Freq: 2 Times Daily Before Meals Route: PO  Start: 08/08/21 0730   End: 08/08/21 0056    Admin Instructions:   Swallow whole; do not crush, split, or chew.                ,   Medication Administration Report for Jina Huddleston L as of 08/09/21 1100    Legend:    Given Hold Not Given Due Canceled Entry Other Actions    Time Time (Time) Time  Time-Action       Discontinued     Completed     Future     MAR Hold     Linked           Medications 08/07/21 08/08/21 08/09/21    Pharmacy to dose vancomycin  Freq: Continuous PRN Route: XX  PRN Reason: Consult  Indications of Use: SKIN AND SOFT TISSUE INFECTION  Start: 08/07/21 2322   End: 08/12/21 2321         Discontinued Medications  Medications 08/07/21 08/08/21 08/09/21       sodium chloride 0.9 % infusion  Rate: 100 mL/hr Dose: 100 mL/hr  Freq: Continuous Route: IV  Start: 08/07/21 2336   End: 08/08/21 1038     0039              sodium chloride 0.9 % infusion  Rate: 100 mL/hr Dose: 100 mL/hr  Freq: Continuous Route: IV  Start: 08/07/21 2333   End: 08/07/21 2334                         and   Medication Administration Report for Jina Huddleston L as of 08/09/21 1100    Legend:    Given Hold Not Given Due Canceled Entry Other Actions    Time Time (Time) Time  Time-Action       Discontinued     Completed     Future     MAR Hold     Linked           Medications 08/07/21 08/08/21 08/09/21    acetaminophen (TYLENOL) tablet 650 mg  Dose: 650 mg  Freq: Every 4 Hours PRN Route: PO  PRN Reason: Mild Pain   Start: 08/07/21 2323    Admin Instructions:   Do not exceed 4 grams of acetaminophen in a 24 hr period. Max dose of 2gm for AST/ALT greater than 120 units/L      If given for pain, use the following pain scale:   Mild Pain  = Pain Score of 1-3, CPOT 1-2  Moderate Pain = Pain Score of 4-6, CPOT 3-4  Severe Pain = Pain Score of 7-10, CPOT 5-8       0751            Or  acetaminophen (TYLENOL) 160 MG/5ML solution 650 mg  Dose: 650 mg  Freq: Every 4 Hours PRN Route: PO  PRN Reason: Mild Pain   Start: 08/07/21 2323    Admin Instructions:   Do not exceed 4 grams of acetaminophen in a 24 hr period. Max dose of 2gm for AST/ALT greater than 120 units/L      If given for pain, use the following pain scale:   Mild Pain = Pain Score of 1-3, CPOT 1-2  Moderate Pain = Pain Score of 4-6, CPOT 3-4  Severe Pain = Pain Score of 7-10, CPOT 5-8                   Or  acetaminophen (TYLENOL) suppository 650 mg  Dose: 650 mg  Freq: Every 4 Hours PRN Route: RE  PRN Reason: Mild Pain   Start: 08/07/21 2323    Admin Instructions:   Do not exceed 4 grams of acetaminophen in a 24 hr period. Max dose of 2gm for AST/ALT greater than 120 units/L      If given for pain, use the following pain scale:   Mild Pain = Pain Score of 1-3, CPOT 1-2  Moderate Pain = Pain Score of 4-6, CPOT 3-4  Severe Pain = Pain Score of 7-10, CPOT 5-8                    albuterol (PROVENTIL) nebulizer solution 0.083% 2.5 mg/3mL  Dose: 2.5 mg  Freq: Every 4 Hours PRN Route: NEBULIZATION  PRN Reason: Wheezing  Start: 08/08/21 0029          diazePAM (VALIUM) tablet 2 mg  Dose: 2 mg  Freq: 2 Times Daily PRN Route: PO  PRN Reason: Anxiety  Start: 08/08/21 0029    Admin Instructions:    Caution: Look alike/sound alike drug alert. Avoid use with Nikita's Wort. Avoid grapefruit juice.      0054              HYDROmorphone (DILAUDID) injection 0.5 mg  Dose: 0.5 mg  Freq: Every 2 Hours PRN Route: IV  PRN Reason: Severe Pain   Start: 08/07/21 2323   End: 08/14/21 2322    Admin Instructions:   If given for pain, use the following pain scale:  Mild Pain = Pain Score of 1-3, CPOT 1-2  Moderate Pain = Pain Score of 4-6, CPOT 3-4  Severe Pain = Pain Score of 7-10, CPOT 5-8      0017     [id number]        0832     1113     1519       1935     2140         0025     0520     0925          And  naloxone (NARCAN) injection 0.4 mg  Dose: 0.4 mg  Freq: Every 5 Minutes PRN Route: IV  PRN Reason: Respiratory Depression  Start: 08/07/21 2323    Admin Instructions:   If Respiratory Rate Less Than 8 or Patient is Difficult to Arouse, Stop ALL Narcotics & Contact Provider.  Administer Slow IV Push.  Repeat As Ordered Until Respiratory Rate is Greater Than 12.           ondansetron (ZOFRAN) injection 4 mg  Dose: 4 mg  Freq: Every 6 Hours PRN Route: IV  PRN Reasons: Nausea,Vomiting  Start: 08/07/21 2323    Admin Instructions:   If BOTH ondansetron (ZOFRAN) and promethazine (PHENERGAN) are ordered use ondansetron first and THEN promethazine IF ondansetron is ineffective.      0210     0853     1935        0746             Pharmacy to dose vancomycin  Freq: Continuous PRN Route: XX  PRN Reason: Consult  Indications of Use: SKIN AND SOFT TISSUE INFECTION  Start: 08/07/21 2322   End: 08/12/21 2321          sodium chloride 0.9 % flush 10 mL  Dose: 10 mL  Freq: As Needed Route: IV  PRN Reason: Line Care  Start: 08/07/21 2322          sodium chloride 0.9 % flush 10 mL  Dose: 10 mL  Freq: As Needed Route: IV  PRN Reason: Line Care  Start: 08/07/21 2054                   Orders (active)      Start     Ordered    08/10/21 0930  Vancomycin, Trough Vancomycin dose due at 1000. Please make sure Vancomycin IV is not infusing before drawing the vancomycin level. Hold vancomycin dose if level is >21 mcg/mL.  Timed     Comments: Vancomycin dose due at 1000. Please make sure Vancomycin IV is not infusing before drawing the vancomycin level. Hold vancomycin dose if level is >21 mcg/mL.      08/07/21 2328 08/08/21 1000  vancomycin 1500 mg/500 mL 0.9% NS IVPB ([facility])  Every 24 Hours      08/07/21 2328 08/08/21 0900  anastrozole (ARIMIDEX) tablet 1 mg  Daily      08/08/21 0029 08/08/21 0900  ascorbic acid (VITAMIN C) tablet 250 mg  Daily       08/08/21 0029    08/08/21 0900  calcium carbonate (oyster shell) tablet 500 mg  Daily      08/08/21 0029    08/08/21 0900  cetirizine (zyrTEC) tablet 10 mg  Daily      08/08/21 0029    08/08/21 0900  famotidine (PEPCID) tablet 20 mg  Daily      08/08/21 0029    08/08/21 0900  metoprolol succinate XL (TOPROL-XL) 24 hr tablet 25 mg  Every 24 Hours Scheduled      08/08/21 0029    08/08/21 0730  pantoprazole (PROTONIX) EC tablet 40 mg  2 Times Daily Before Meals      08/08/21 0056    08/08/21 0115  desvenlafaxine (PRISTIQ) 24 hr tablet 50 mg  Nightly      08/08/21 0029    08/08/21 0115  budesonide-formoterol (SYMBICORT) 160-4.5 MCG/ACT inhaler 2 puff  2 Times Daily      08/08/21 0029    08/08/21 0029  albuterol (PROVENTIL) nebulizer solution 0.083% 2.5 mg/3mL  Every 4 Hours PRN      08/08/21 0029    08/08/21 0029  diazePAM (VALIUM) tablet 2 mg  2 Times Daily PRN      08/08/21 0029    08/08/21 0000  Vital Signs  Every 4 Hours      08/07/21 2324 08/07/21 2330  Code Status and Medical Interventions:  Continuous      08/07/21 2329 08/07/21 2326  sodium chloride 0.9 % flush 10 mL  Every 12 Hours Scheduled      08/07/21 2324 08/07/21 2324  Diet Regular; Cardiac  Diet Effective Now      08/07/21 2324 08/07/21 2323  ondansetron (ZOFRAN) injection 4 mg  Every 6 Hours PRN      08/07/21 2324 08/07/21 2323  HYDROmorphone (DILAUDID) injection 0.5 mg  Every 2 Hours PRN      08/07/21 2324 08/07/21 2323  naloxone (NARCAN) injection 0.4 mg  Every 5 Minutes PRN      08/07/21 2324 08/07/21 2323  acetaminophen (TYLENOL) tablet 650 mg  Every 4 Hours PRN      08/07/21 2324 08/07/21 2323  acetaminophen (TYLENOL) 160 MG/5ML solution 650 mg  Every 4 Hours PRN      08/07/21 2324 08/07/21 2323  acetaminophen (TYLENOL) suppository 650 mg  Every 4 Hours PRN      08/07/21 2324 08/07/21 2323  Intake & Output  Every Shift      08/07/21 2324 08/07/21 2323  Oxygen Therapy- Nasal Cannula; Titrate for SPO2: 90% - 95%   Continuous      08/07/21 2324 08/07/21 2323  Insert Peripheral IV  Once      08/07/21 2324 08/07/21 2323  Maintain Sequential Compression Device  Continuous      08/07/21 2324 08/07/21 2322  Pharmacy to dose vancomycin  Continuous PRN      08/07/21 2324 08/07/21 2322  sodium chloride 0.9 % flush 10 mL  As Needed      08/07/21 2324 08/07/21 2310  Inpatient Plastic Surgery Consult  Once     Specialty:  Plastic Surgery  Provider:  Radha Guevara MD    08/07/21 2311 08/07/21 2055  Insert peripheral IV  Once      08/07/21 2056 08/07/21 2054  sodium chloride 0.9 % flush 10 mL  As Needed      08/07/21 2056    Unscheduled  Up With Assistance  As Needed      08/07/21 2324                     Physician Progress Notes       Nik Monroy MD at 08/09/21 1022        INFECTIOUS DISEASES PROGRESS NOTE    CC: f/u abscess    S:   Still with left breast leakage and tenderness but no f/c/ns    O:  Physical Exam:  Temp:  [96.5 °F (35.8 °C)-97.8 °F (36.6 °C)] 97.3 °F (36.3 °C)  Heart Rate:  [91-99] 91  Resp:  [16-18] 16  BP: (144-168)/(77-94) 168/94  Physical Exam  Pulmonary:      Effort: Pulmonary effort is normal.   Skin:     General: Skin is warm and dry.      Comments: Swelling over l breast with saturated dressing   Neurological:      Mental Status: She is alert.     Diagnostics:    WBC 5  Cr 0.8  Wound cx: NGTD  BCx NGTD    Assessment/Plan   1.  Left breast abscess  2.  Valvular heart disease, follows with Dr. Carroll and has normal ejection fraction and mild to moderate mitral valve stenosis and moderate MR  3.  Pulmonary fibrosis, likely secondary to prior radiation  4.  Mild hepatitis     Wound cx ngtd.  D/w Dr Guevara and planning I and D   Cont vanc and will tailor based on cx    Nik Monroy MD  10:26 EDT  08/09/21           Electronically signed by Nik Monroy MD at 08/09/21 1029     Tra Holcomb MD at 08/08/21 1220              Name: Jina Huddleston  ADMIT: 2021   : 1975  PCP: Dalton Salinas APRN    MRN: [id number] LOS: 1 days   AGE/SEX: 46 y.o. female  ROOM: [facility]     Subjective   Subjective   Patient continues with pain/swelling/discharge from the left breast.  No fever.  Positive chills or night sweats.    Review of Systems  Cardiovascular/respiratory.  Positive atypical chest pain.  Positive shortness of breath.  No cough.  No wheeze.  No hemoptysis.  GI.  Positive occasional nausea.  No vomiting.  No abdominal pain.  No bowel movement since admission.  .  No dysuria or hematuria.    Objective   Objective   Vital Signs  Temp:  [96.5 °F (35.8 °C)-97.6 °F (36.4 °C)] 96.5 °F (35.8 °C)  Heart Rate:  [] 95  Resp:  [16-18] 18  BP: (133-161)/(80-93) 150/93  SpO2:  [94 %-100 %] 97 %  on   ;   Device (Oxygen Therapy): room air    Intake/Output Summary (Last 24 hours) at 2021 1220  Last data filed at 2021 1100  Gross per 24 hour   Intake 1620 ml   Output 600 ml   Net 1020 ml     Body mass index is 30.54 kg/m².      21  0040   Weight: 80.7 kg (177 lb 14.6 oz)     Physical Exam  General.  Middle-aged female.  Alert and oriented x3.  In mild distress because of the pain.  Eyes.  Pupils equal round and reactive.  Intact extraocular musculature.  No pallor or jaundice.  Normal conjunctivae and lids.  Oral cavity.  Moist mucous membrane.  Neck.  Supple.  No JVD.  No lymphadenopathy or thyromegaly.  Cardiovascular.  Regular rate and rhythm.  Grade 2 systolic murmur.  Chest.  Bilateral air entry with no added sounds.  Breasts.  Scars both breasts.  Expanders in place.  There is redness/hotness/induration over the left breast with a purulent discharge coming from 1/2 x 0.25 cm ulcer.  No axillary lymphadenopathy.  Abdomen.  Soft lax.  No tenderness.  No organomegaly.  No guarding or rebound.  Extremities.  No clubbing cyanosis or edema.    Results Review:      Results from last 7 days   Lab Units 21  0949 21  2104   SODIUM  mmol/L 140 138   POTASSIUM mmol/L 3.9 4.1   CHLORIDE mmol/L 105 100   CO2 mmol/L 23.4 25.1   BUN mg/dL 15 15   CREATININE mg/dL 0.93 1.10*   GLUCOSE mg/dL 91 83   CALCIUM mg/dL 8.9 9.9   AST (SGOT) U/L 87* 94*   ALT (SGPT) U/L 152* 162*     Estimated Creatinine Clearance: 76 mL/min (by C-G formula based on SCr of 0.93 mg/dL).                            Invalid input(s):  PHOS        Invalid input(s): LDLCALC  Results from last 7 days   Lab Units 08/08/21  0948 08/07/21 2104 08/07/21 2104   WBC 10*3/mm3 5.64  --  8.03   HEMOGLOBIN g/dL 14.6  --  15.7   HEMATOCRIT % 43.0  --  44.4   PLATELETS 10*3/mm3 200  --  219   MCV fL 107.2*   < > 107.5*   MCH pg 36.4*   < > 38.0*   MCHC g/dL 34.0   < > 35.4   RDW % 13.0   < > 13.4   RDW-SD fl 51.0   < > 53.4   MPV fL 10.3   < > 10.7   NEUTROPHIL % % 57.5   < > 60.4   LYMPHOCYTE % % 30.1   < > 28.5   MONOCYTES % % 6.7   < > 7.1   EOSINOPHIL % % 4.1   < > 2.9   BASOPHIL % % 1.2   < > 0.9   IMM GRAN % % 0.4   < > 0.2   NEUTROS ABS 10*3/mm3 3.24   < > 4.85   LYMPHS ABS 10*3/mm3 1.70   < > 2.29   MONOS ABS 10*3/mm3 0.38   < > 0.57   EOS ABS 10*3/mm3 0.23   < > 0.23   BASOS ABS 10*3/mm3 0.07   < > 0.07   IMMATURE GRANS (ABS) 10*3/mm3 0.02   < > 0.02   NRBC /100 WBC 0.0   < > 0.0    < > = values in this interval not displayed.     Results from last 7 days   Lab Units 08/08/21  0949   INR  0.95         Results from last 7 days   Lab Units 08/08/21  0948 08/07/21  2104   PROCALCITONIN ng/mL  --  0.04   LACTATE mmol/L 1.5 1.4     Results from last 7 days   Lab Units 08/07/21  2104   CRP mg/dL <0.30         Results from last 7 days   Lab Units 08/07/21  2207   WOUNDCX  No growth                   Imaging:  Imaging Results (Last 24 Hours)     ** No results found for the last 24 hours. 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           · Left breast abscess in a patient with a history of left breast cancer s/p bilateral mastectomy and left breast expander insertion.  Patient with a previous left breast expander infection with MRSA infection status post removal.  Lactic acid and pro calcitonin are normal.  No clinical evidence of sepsis with no fever and normal white count.  Wound cultures and blood cultures are pending.  ID following.  Currently on IV vancomycin.  Surgical consultation is pending and she might have to have a removal of the left breast expander.  Continue Arimidex.  · History of Hodgkin's lymphoma/macrocytosis..  Normal CBC except macrocytosis.  Will check B12/folate/TSH/cortisol.  Will monitor.  · History of chronic diastolic congestive heart failure/MS/MR.  Does not appear to have angina or congestive heart failure at this time.  Positive dyspnea.  We will stop IV fluid.  · Transaminitis.  Hepatitis panel is negative.  Benign GI examination.  In view of history of cancer will check right upper quadrant ultrasound.  · Radiation pulmonary fibrosis.  Stable respiratory status.  · Acute kidney failure.  Mostly prerenal azotemia.  This has resolved with IV fluid.  Patient euvolemic.  We will stop IV fluid and Lasix at this time.  Will monitor renal function.  · VTE prophylaxis.   Sequential compression devices.      · Discussed discussed my findings and plan of treatment with the patient.      Tra Holcomb MD  [facility]  08/08/21  12:20 EDT          Electronically signed by Tra Holcomb MD at 08/08/21 1228          Consult Notes       Nik Monroy MD at 08/08/21 0994      Consult Orders    1. Inpatient Infectious Diseases Consult [[id number]] ordered by Winston Rojo MD at 08/07/21 8680               Referring Provider: Dr Rojo    Subjective   History of present illness: 46-year-old we are asked evaluate for breast abscess.  Per review past medical records, she is known to our group from an admission in July 2020.  The patient has a history of bipolar disorder, Hodgkin's lymphoma and left breast cancer status post bilateral mastectomy on May 6, 2020 with implantation of bilateral tissue expanders.  She underwent I&D with spacer removal on 7/14/2020 and prior cultures are growing coag negative staph which was susceptible to linezolid.  She completed 14 days of this and patient told me she had reexplantation of her breast expanders in February 2021.  Patient reports that she had done well up until 5 days ago when she developed left breast swelling and bruising around the inferior portion of the expander which then turned into a wound.  She had some chills but no luda fevers.  Unfortunately her breast started draining and she was started on Bactrim.  She was told to come to the ER if symptoms worsen and presented to [facility] emergency department 8/7/2021.  There she had a white count of 88 with a creatinine of 1.1 and mildly elevated liver function tests with an ALT of 162 and AST of 94.  Procalcitonin was negative as was CRP.  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, estrogen receptor positive (CMS/HCC) 3/31/2020   • Drug therapy     For Hodgkin's Lymphoma   • GERD (gastroesophageal reflux disease)    • Heart murmur    • History of chemotherapy 1992   • Hodgkin lymphoma (CMS/HCC) 01/1992   • Hx of radiation therapy 1992    Chest wall for Hodgkin's   • Low back pain    • Marijuana use    • MRSA infection 10/2020    right little toe after a surgery  [facility]   • Neuropathy     FINGERS   • Nonrheumatic aortic valve insufficiency 1/1/1992   • Nonrheumatic mitral valve stenosis 1/1/1992       Past Surgical History:   Procedure Laterality Date   • BREAST ABSCESS INCISION AND DRAINAGE Left 7/14/2020    Procedure: INCISION AND DRAINAGE OF LEFT BREAST SEROMA,;  Surgeon: Radha Guevara MD;  Location: [facility];  Service: Plastics;  Laterality: Left;   • BREAST BIOPSY     • BREAST BIOPSY     • BREAST IMPLANT SURGERY Left 7/14/2020    Procedure: REMOVAL OF TISSUE EXPANDER;  Surgeon: Radha Guevara MD;  Location: [facility];  Service: Plastics;  Laterality: Left;   • BREAST RECONSTRUCTION Bilateral 5/6/2020    Procedure: BILATERAL BREAST RECONSTRUCTION WITH TISSUE EXPANDERSAND ALLODERM;  Surgeon: Radha Guevara MD;  Location: [facility];  Service: Plastics;  Laterality: Bilateral;   • BREAST TISSUE EXPANDER INSERTION Left 1/20/2021    Procedure: DELAYED LEFT BREAST INSERTION OF TISSUE EXPANDER;  Surgeon: Radha Guevara MD;  Location: [facility];  Service: Plastics;  Laterality: Left;   • COLONOSCOPY     • CYST REMOVAL      HAND   • DIAGNOSTIC LAPAROSCOPY     • ENDOSCOPY     • HYSTERECTOMY     • INSERTION OF BREAST TISSUE EXPANDER Bilateral    • KNEE SURGERY Left    • MASTECTOMY W/ SENTINEL NODE BIOPSY Bilateral 5/6/2020    Procedure: bilateral total mastectomy, bilateral sentinel node biopsy;  Surgeon: Shreya Tim  MD;  Location: [facility] MAIN OR;  Service: General;  Laterality: Bilateral;   • MULTIPLE TOOTH EXTRACTIONS     • TOE SURGERY Right    • TUBAL ABDOMINAL LIGATION     • VENOUS ACCESS DEVICE (PORT) INSERTION AND REMOVAL         Family history notable for her daughter who had a breast abscess  Social history: She is  and lives in [place], [place].    No Known Allergies    Medication:  Antibiotics:  Anti-Infectives (From admission, onward)    Ordered     Dose/Rate Route Frequency Start Stop    08/07/21 2328  vancomycin 1500 mg/500 mL 0.9% NS IVPB ([facility])     Ordering Provider: Adri Carreno APRN    18.5 mg/kg × 74.7 kg  over 90 Minutes Intravenous Every 24 Hours 08/08/21 1000 08/13/21 0959    08/07/21 2324  Pharmacy to dose vancomycin     Ordering Provider: Adri Carreno APRN     Does not apply Continuous PRN 08/07/21 2322 08/12/21 2321 08/07/21 2056  vancomycin 1500 mg/500 mL 0.9% NS IVPB ([facility])     Ordering Provider: Jesús Manuel PA    20 mg/kg × 74.7 kg Intravenous Once 08/07/21 2058 08/08/21 0006          Review of Systems  Pertinent items are noted in HPI, all other systems reviewed and negative    Objective     Physical Exam:   Vital Signs   Temp:  [96.8 °F (36 °C)-97.6 °F (36.4 °C)] 97 °F (36.1 °C)  Heart Rate:  [] 109  Resp:  [16-18] 18  BP: (133-161)/(80-89) 161/80    GENERAL: Awake and alert, in no acute distress.   HEENT: Oropharynx is clear. Hearing is grossly normal.   EYES: PERRL. No conjunctival injection. No lid lag.   LYMPHATICS: No lymphadenopathy of the neck or inguinal regions.   HEART: Regular rate and regular rhythm. No peripheral edema.   LUNGS: dry rales with normal respiratory effort.   GI: Soft, nontender, nondistended. No appreciable organomegaly.   SKIN: Warm and dry with mild swelling of the left breast extending up into the axilla with purulent drainage from the lateral anterior portion  PSYCHIATRIC: Appropriate mood, affect, insight, and judgment.      Results Review:  Creatinine 1.1  Alk phos 136    AST 94  Hemoglobin 8  Platelets 219       Microbiology:  8/7 wound culture pending  8/7 blood cultures pending    Radiology:  PET CT scan from April 2020 showed no metastatic disease with interstitial changes in the medial upper lobes consistent with posttreatment changes    Assessment/Plan   1.  Left breast abscess  2.  Valvular heart disease, follows with Dr. Carroll and has normal ejection fraction and mild to moderate mitral valve stenosis and moderate MR  3.  Pulmonary fibrosis, likely secondary to prior radiation  4.  Mild hepatitis    We will continue vancomycin for an AUC of 400-600 given prior isolated coag negative staph.  Surgical consultation is pending and we will decide based on their evaluation whether or not we need more imaging or I&D indicated.  We will plan to check the vancomycin trough to help guide antibiotic dosing on the a.m. of 8/10.      Thank you for this consult.  We will continue to follow along and tailor antibiotics as the patient's clinical course evolves.      Nik Monroy MD  08/08/21  09:43 EDT          Electronically signed by Nik Monroy MD at 08/08/21 1024           Veronica Haddad RN   Registered Nurse      Plan of Care   Signed   Date of Service:  08/09/21 0531   Creation Time:  08/09/21 0531            Signed             Goal Outcome Evaluation:  Plan of Care Reviewed With: patient  Progress: improving     Dressing on left breast has remained dry and intact this shift. Dressing was changed by day shift nurse at last change of shift. Pt has requested PRN dilaudid 3x this shift. Awaiting surgery consult.

## 2021-08-09 NOTE — PLAN OF CARE
Goal Outcome Evaluation:  Plan of Care Reviewed With: patient        Progress: no change  Outcome Summary: pt had ok day, down a bit now that she knows her expander has to come out. awaiting surgery plans from dr.pallazo. dressing changed x1, drainage has decreased since admission. dilaudid given as needed for pain. continue vancomycin, trough due tomorrow AM. VSS will continue to monitor

## 2021-08-10 ENCOUNTER — ANESTHESIA EVENT (OUTPATIENT)
Dept: PERIOP | Facility: HOSPITAL | Age: 46
End: 2021-08-10

## 2021-08-10 ENCOUNTER — ANESTHESIA (OUTPATIENT)
Dept: PERIOP | Facility: HOSPITAL | Age: 46
End: 2021-08-10

## 2021-08-10 LAB
ALBUMIN SERPL-MCNC: 3.9 G/DL (ref 3.5–5.2)
ALBUMIN/GLOB SERPL: 1.8 G/DL
ALP SERPL-CCNC: 110 U/L (ref 39–117)
ALT SERPL W P-5'-P-CCNC: 142 U/L (ref 1–33)
ANION GAP SERPL CALCULATED.3IONS-SCNC: 10.2 MMOL/L (ref 5–15)
AST SERPL-CCNC: 74 U/L (ref 1–32)
BACTERIA SPEC AEROBE CULT: NORMAL
BILIRUB SERPL-MCNC: 0.8 MG/DL (ref 0–1.2)
BUN SERPL-MCNC: 10 MG/DL (ref 6–20)
BUN/CREAT SERPL: 14.3 (ref 7–25)
CALCIUM SPEC-SCNC: 9.1 MG/DL (ref 8.6–10.5)
CHLORIDE SERPL-SCNC: 103 MMOL/L (ref 98–107)
CO2 SERPL-SCNC: 25.8 MMOL/L (ref 22–29)
CREAT SERPL-MCNC: 0.7 MG/DL (ref 0.57–1)
DEPRECATED RDW RBC AUTO: 53.2 FL (ref 37–54)
ERYTHROCYTE [DISTWIDTH] IN BLOOD BY AUTOMATED COUNT: 13 % (ref 12.3–15.4)
GFR SERPL CREATININE-BSD FRML MDRD: 89 ML/MIN/1.73
GLOBULIN UR ELPH-MCNC: 2.2 GM/DL
GLUCOSE SERPL-MCNC: 84 MG/DL (ref 65–99)
GRAM STN SPEC: NORMAL
GRAM STN SPEC: NORMAL
HCT VFR BLD AUTO: 39.7 % (ref 34–46.6)
HGB BLD-MCNC: 13.2 G/DL (ref 12–15.9)
MCH RBC QN AUTO: 36.5 PG (ref 26.6–33)
MCHC RBC AUTO-ENTMCNC: 33.2 G/DL (ref 31.5–35.7)
MCV RBC AUTO: 109.7 FL (ref 79–97)
PLATELET # BLD AUTO: 171 10*3/MM3 (ref 140–450)
PMV BLD AUTO: 10.2 FL (ref 6–12)
POTASSIUM SERPL-SCNC: 4 MMOL/L (ref 3.5–5.2)
PROT SERPL-MCNC: 6.1 G/DL (ref 6–8.5)
RBC # BLD AUTO: 3.62 10*6/MM3 (ref 3.77–5.28)
SARS-COV-2 RNA RESP QL NAA+PROBE: NOT DETECTED
SODIUM SERPL-SCNC: 139 MMOL/L (ref 136–145)
T3 SERPL-MCNC: 132 NG/DL (ref 71–180)
T3FREE SERPL-MCNC: 3.4 PG/ML (ref 2–4.4)
VANCOMYCIN TROUGH SERPL-MCNC: 8.5 MCG/ML (ref 5–20)
WBC # BLD AUTO: 4.85 10*3/MM3 (ref 3.4–10.8)

## 2021-08-10 PROCEDURE — U0003 INFECTIOUS AGENT DETECTION BY NUCLEIC ACID (DNA OR RNA); SEVERE ACUTE RESPIRATORY SYNDROME CORONAVIRUS 2 (SARS-COV-2) (CORONAVIRUS DISEASE [COVID-19]), AMPLIFIED PROBE TECHNIQUE, MAKING USE OF HIGH THROUGHPUT TECHNOLOGIES AS DESCRIBED BY CMS-2020-01-R: HCPCS | Performed by: INTERNAL MEDICINE

## 2021-08-10 PROCEDURE — 25010000002 ONDANSETRON PER 1 MG: Performed by: ANESTHESIOLOGY

## 2021-08-10 PROCEDURE — 25010000002 GENTAMICIN PER 80 MG: Performed by: PLASTIC SURGERY

## 2021-08-10 PROCEDURE — 25010000002 VANCOMYCIN 10 G RECONSTITUTED SOLUTION: Performed by: INTERNAL MEDICINE

## 2021-08-10 PROCEDURE — 0H9U00Z DRAINAGE OF LEFT BREAST WITH DRAINAGE DEVICE, OPEN APPROACH: ICD-10-PCS | Performed by: PLASTIC SURGERY

## 2021-08-10 PROCEDURE — 87015 SPECIMEN INFECT AGNT CONCNTJ: CPT | Performed by: PLASTIC SURGERY

## 2021-08-10 PROCEDURE — 80202 ASSAY OF VANCOMYCIN: CPT | Performed by: NURSE PRACTITIONER

## 2021-08-10 PROCEDURE — 87070 CULTURE OTHR SPECIMN AEROBIC: CPT | Performed by: PLASTIC SURGERY

## 2021-08-10 PROCEDURE — 80053 COMPREHEN METABOLIC PANEL: CPT | Performed by: INTERNAL MEDICINE

## 2021-08-10 PROCEDURE — 25010000002 NEOSTIGMINE 5 MG/10ML SOLUTION: Performed by: ANESTHESIOLOGY

## 2021-08-10 PROCEDURE — 25010000002 FENTANYL CITRATE (PF) 50 MCG/ML SOLUTION: Performed by: ANESTHESIOLOGY

## 2021-08-10 PROCEDURE — 25010000002 HYDROMORPHONE PER 4 MG: Performed by: NURSE PRACTITIONER

## 2021-08-10 PROCEDURE — 94799 UNLISTED PULMONARY SVC/PX: CPT

## 2021-08-10 PROCEDURE — 25010000002 HYDROMORPHONE PER 4 MG: Performed by: ANESTHESIOLOGY

## 2021-08-10 PROCEDURE — 25010000002 PROPOFOL 10 MG/ML EMULSION: Performed by: ANESTHESIOLOGY

## 2021-08-10 PROCEDURE — 25010000002 MIDAZOLAM PER 1 MG: Performed by: ANESTHESIOLOGY

## 2021-08-10 PROCEDURE — 36415 COLL VENOUS BLD VENIPUNCTURE: CPT | Performed by: NURSE PRACTITIONER

## 2021-08-10 PROCEDURE — 25010000002 VANCOMYCIN 10 G RECONSTITUTED SOLUTION: Performed by: NURSE PRACTITIONER

## 2021-08-10 PROCEDURE — 25010000002 DEXAMETHASONE PER 1 MG: Performed by: ANESTHESIOLOGY

## 2021-08-10 PROCEDURE — 0HPU0NZ REMOVAL OF TISSUE EXPANDER FROM LEFT BREAST, OPEN APPROACH: ICD-10-PCS | Performed by: PLASTIC SURGERY

## 2021-08-10 PROCEDURE — 85027 COMPLETE CBC AUTOMATED: CPT | Performed by: INTERNAL MEDICINE

## 2021-08-10 PROCEDURE — 87075 CULTR BACTERIA EXCEPT BLOOD: CPT | Performed by: PLASTIC SURGERY

## 2021-08-10 PROCEDURE — 99232 SBSQ HOSP IP/OBS MODERATE 35: CPT | Performed by: INTERNAL MEDICINE

## 2021-08-10 PROCEDURE — 87176 TISSUE HOMOGENIZATION CULTR: CPT | Performed by: PLASTIC SURGERY

## 2021-08-10 PROCEDURE — 25010000002 ONDANSETRON PER 1 MG: Performed by: NURSE PRACTITIONER

## 2021-08-10 PROCEDURE — 87205 SMEAR GRAM STAIN: CPT | Performed by: PLASTIC SURGERY

## 2021-08-10 PROCEDURE — 88304 TISSUE EXAM BY PATHOLOGIST: CPT | Performed by: PLASTIC SURGERY

## 2021-08-10 RX ORDER — SODIUM CHLORIDE, SODIUM LACTATE, POTASSIUM CHLORIDE, CALCIUM CHLORIDE 600; 310; 30; 20 MG/100ML; MG/100ML; MG/100ML; MG/100ML
INJECTION, SOLUTION INTRAVENOUS CONTINUOUS PRN
Status: DISCONTINUED | OUTPATIENT
Start: 2021-08-10 | End: 2021-08-10 | Stop reason: SURG

## 2021-08-10 RX ORDER — IBUPROFEN 600 MG/1
600 TABLET ORAL ONCE AS NEEDED
Status: DISCONTINUED | OUTPATIENT
Start: 2021-08-10 | End: 2021-08-10 | Stop reason: HOSPADM

## 2021-08-10 RX ORDER — HYDROCODONE BITARTRATE AND ACETAMINOPHEN 7.5; 325 MG/1; MG/1
1 TABLET ORAL ONCE AS NEEDED
Status: DISCONTINUED | OUTPATIENT
Start: 2021-08-10 | End: 2021-08-10 | Stop reason: HOSPADM

## 2021-08-10 RX ORDER — OXYCODONE AND ACETAMINOPHEN 10; 325 MG/1; MG/1
1 TABLET ORAL EVERY 4 HOURS PRN
Status: DISCONTINUED | OUTPATIENT
Start: 2021-08-10 | End: 2021-08-10 | Stop reason: HOSPADM

## 2021-08-10 RX ORDER — PROMETHAZINE HYDROCHLORIDE 25 MG/1
25 TABLET ORAL ONCE AS NEEDED
Status: DISCONTINUED | OUTPATIENT
Start: 2021-08-10 | End: 2021-08-10 | Stop reason: HOSPADM

## 2021-08-10 RX ORDER — HYDRALAZINE HYDROCHLORIDE 20 MG/ML
5 INJECTION INTRAMUSCULAR; INTRAVENOUS
Status: DISCONTINUED | OUTPATIENT
Start: 2021-08-10 | End: 2021-08-10 | Stop reason: HOSPADM

## 2021-08-10 RX ORDER — LIDOCAINE HYDROCHLORIDE 20 MG/ML
INJECTION, SOLUTION INFILTRATION; PERINEURAL AS NEEDED
Status: DISCONTINUED | OUTPATIENT
Start: 2021-08-10 | End: 2021-08-10 | Stop reason: SURG

## 2021-08-10 RX ORDER — DEXAMETHASONE SODIUM PHOSPHATE 10 MG/ML
INJECTION INTRAMUSCULAR; INTRAVENOUS AS NEEDED
Status: DISCONTINUED | OUTPATIENT
Start: 2021-08-10 | End: 2021-08-10 | Stop reason: SURG

## 2021-08-10 RX ORDER — FENTANYL CITRATE 50 UG/ML
50 INJECTION, SOLUTION INTRAMUSCULAR; INTRAVENOUS
Status: DISCONTINUED | OUTPATIENT
Start: 2021-08-10 | End: 2021-08-10 | Stop reason: HOSPADM

## 2021-08-10 RX ORDER — PROPOFOL 10 MG/ML
VIAL (ML) INTRAVENOUS AS NEEDED
Status: DISCONTINUED | OUTPATIENT
Start: 2021-08-10 | End: 2021-08-10 | Stop reason: SURG

## 2021-08-10 RX ORDER — FLUMAZENIL 0.1 MG/ML
0.2 INJECTION INTRAVENOUS AS NEEDED
Status: DISCONTINUED | OUTPATIENT
Start: 2021-08-10 | End: 2021-08-10 | Stop reason: HOSPADM

## 2021-08-10 RX ORDER — ONDANSETRON 2 MG/ML
4 INJECTION INTRAMUSCULAR; INTRAVENOUS ONCE AS NEEDED
Status: DISCONTINUED | OUTPATIENT
Start: 2021-08-10 | End: 2021-08-10 | Stop reason: HOSPADM

## 2021-08-10 RX ORDER — DIPHENHYDRAMINE HCL 25 MG
25 CAPSULE ORAL
Status: DISCONTINUED | OUTPATIENT
Start: 2021-08-10 | End: 2021-08-10 | Stop reason: HOSPADM

## 2021-08-10 RX ORDER — SODIUM CHLORIDE 0.9 % (FLUSH) 0.9 %
10 SYRINGE (ML) INJECTION AS NEEDED
Status: DISCONTINUED | OUTPATIENT
Start: 2021-08-10 | End: 2021-08-10 | Stop reason: HOSPADM

## 2021-08-10 RX ORDER — HYDROMORPHONE HYDROCHLORIDE 1 MG/ML
0.5 INJECTION, SOLUTION INTRAMUSCULAR; INTRAVENOUS; SUBCUTANEOUS
Status: DISCONTINUED | OUTPATIENT
Start: 2021-08-10 | End: 2021-08-10 | Stop reason: HOSPADM

## 2021-08-10 RX ORDER — ROCURONIUM BROMIDE 10 MG/ML
INJECTION, SOLUTION INTRAVENOUS AS NEEDED
Status: DISCONTINUED | OUTPATIENT
Start: 2021-08-10 | End: 2021-08-10 | Stop reason: SURG

## 2021-08-10 RX ORDER — SODIUM CHLORIDE, SODIUM LACTATE, POTASSIUM CHLORIDE, CALCIUM CHLORIDE 600; 310; 30; 20 MG/100ML; MG/100ML; MG/100ML; MG/100ML
9 INJECTION, SOLUTION INTRAVENOUS CONTINUOUS PRN
Status: DISCONTINUED | OUTPATIENT
Start: 2021-08-10 | End: 2021-08-10 | Stop reason: HOSPADM

## 2021-08-10 RX ORDER — NALOXONE HCL 0.4 MG/ML
0.2 VIAL (ML) INJECTION AS NEEDED
Status: DISCONTINUED | OUTPATIENT
Start: 2021-08-10 | End: 2021-08-10 | Stop reason: HOSPADM

## 2021-08-10 RX ORDER — LABETALOL HYDROCHLORIDE 5 MG/ML
5 INJECTION, SOLUTION INTRAVENOUS
Status: DISCONTINUED | OUTPATIENT
Start: 2021-08-10 | End: 2021-08-10 | Stop reason: HOSPADM

## 2021-08-10 RX ORDER — MIDAZOLAM HYDROCHLORIDE 1 MG/ML
1 INJECTION INTRAMUSCULAR; INTRAVENOUS
Status: DISCONTINUED | OUTPATIENT
Start: 2021-08-10 | End: 2021-08-10 | Stop reason: HOSPADM

## 2021-08-10 RX ORDER — ONDANSETRON 2 MG/ML
INJECTION INTRAMUSCULAR; INTRAVENOUS AS NEEDED
Status: DISCONTINUED | OUTPATIENT
Start: 2021-08-10 | End: 2021-08-10 | Stop reason: SURG

## 2021-08-10 RX ORDER — EPHEDRINE SULFATE 50 MG/ML
INJECTION, SOLUTION INTRAVENOUS AS NEEDED
Status: DISCONTINUED | OUTPATIENT
Start: 2021-08-10 | End: 2021-08-10

## 2021-08-10 RX ORDER — DIPHENHYDRAMINE HYDROCHLORIDE 50 MG/ML
12.5 INJECTION INTRAMUSCULAR; INTRAVENOUS
Status: DISCONTINUED | OUTPATIENT
Start: 2021-08-10 | End: 2021-08-10 | Stop reason: HOSPADM

## 2021-08-10 RX ORDER — SODIUM CHLORIDE 0.9 % (FLUSH) 0.9 %
10 SYRINGE (ML) INJECTION EVERY 12 HOURS SCHEDULED
Status: DISCONTINUED | OUTPATIENT
Start: 2021-08-10 | End: 2021-08-10 | Stop reason: HOSPADM

## 2021-08-10 RX ORDER — PROMETHAZINE HYDROCHLORIDE 25 MG/1
25 SUPPOSITORY RECTAL ONCE AS NEEDED
Status: DISCONTINUED | OUTPATIENT
Start: 2021-08-10 | End: 2021-08-10 | Stop reason: HOSPADM

## 2021-08-10 RX ORDER — FENTANYL CITRATE 50 UG/ML
INJECTION, SOLUTION INTRAMUSCULAR; INTRAVENOUS AS NEEDED
Status: DISCONTINUED | OUTPATIENT
Start: 2021-08-10 | End: 2021-08-10 | Stop reason: SURG

## 2021-08-10 RX ORDER — EPHEDRINE SULFATE 50 MG/ML
5 INJECTION, SOLUTION INTRAVENOUS ONCE AS NEEDED
Status: DISCONTINUED | OUTPATIENT
Start: 2021-08-10 | End: 2021-08-10 | Stop reason: HOSPADM

## 2021-08-10 RX ORDER — NEOSTIGMINE METHYLSULFATE 0.5 MG/ML
INJECTION, SOLUTION INTRAVENOUS AS NEEDED
Status: DISCONTINUED | OUTPATIENT
Start: 2021-08-10 | End: 2021-08-10 | Stop reason: SURG

## 2021-08-10 RX ORDER — MIDAZOLAM HYDROCHLORIDE 1 MG/ML
2 INJECTION INTRAMUSCULAR; INTRAVENOUS ONCE
Status: COMPLETED | OUTPATIENT
Start: 2021-08-10 | End: 2021-08-10

## 2021-08-10 RX ORDER — GLYCOPYRROLATE 0.2 MG/ML
INJECTION INTRAMUSCULAR; INTRAVENOUS AS NEEDED
Status: DISCONTINUED | OUTPATIENT
Start: 2021-08-10 | End: 2021-08-10 | Stop reason: SURG

## 2021-08-10 RX ORDER — HYDROCODONE BITARTRATE AND ACETAMINOPHEN 5; 325 MG/1; MG/1
1 TABLET ORAL EVERY 6 HOURS PRN
Status: DISCONTINUED | OUTPATIENT
Start: 2021-08-10 | End: 2021-08-11

## 2021-08-10 RX ORDER — BUPIVACAINE HYDROCHLORIDE AND EPINEPHRINE 2.5; 5 MG/ML; UG/ML
INJECTION, SOLUTION EPIDURAL; INFILTRATION; INTRACAUDAL; PERINEURAL AS NEEDED
Status: DISCONTINUED | OUTPATIENT
Start: 2021-08-10 | End: 2021-08-10 | Stop reason: HOSPADM

## 2021-08-10 RX ADMIN — ONDANSETRON 4 MG: 2 INJECTION INTRAMUSCULAR; INTRAVENOUS at 09:08

## 2021-08-10 RX ADMIN — HYDROMORPHONE HYDROCHLORIDE 0.5 MG: 1 INJECTION, SOLUTION INTRAMUSCULAR; INTRAVENOUS; SUBCUTANEOUS at 15:47

## 2021-08-10 RX ADMIN — NEOSTIGMINE METHYLSULFATE 2.5 MG: 0.5 INJECTION INTRAVENOUS at 19:17

## 2021-08-10 RX ADMIN — ONDANSETRON 4 MG: 2 INJECTION INTRAMUSCULAR; INTRAVENOUS at 19:18

## 2021-08-10 RX ADMIN — VANCOMYCIN HYDROCHLORIDE 1250 MG: 10 INJECTION, POWDER, LYOPHILIZED, FOR SOLUTION INTRAVENOUS at 21:57

## 2021-08-10 RX ADMIN — MIDAZOLAM 1 MG: 1 INJECTION INTRAMUSCULAR; INTRAVENOUS at 18:06

## 2021-08-10 RX ADMIN — LIDOCAINE HYDROCHLORIDE 80 MG: 20 INJECTION, SOLUTION INFILTRATION; PERINEURAL at 18:32

## 2021-08-10 RX ADMIN — HYDROMORPHONE HYDROCHLORIDE 0.5 MG: 1 INJECTION, SOLUTION INTRAMUSCULAR; INTRAVENOUS; SUBCUTANEOUS at 20:22

## 2021-08-10 RX ADMIN — DIAZEPAM 2 MG: 2 TABLET ORAL at 22:49

## 2021-08-10 RX ADMIN — MIDAZOLAM 2 MG: 1 INJECTION INTRAMUSCULAR; INTRAVENOUS at 20:24

## 2021-08-10 RX ADMIN — PANTOPRAZOLE SODIUM 40 MG: 40 TABLET, DELAYED RELEASE ORAL at 06:53

## 2021-08-10 RX ADMIN — VANCOMYCIN HYDROCHLORIDE 1500 MG: 10 INJECTION, POWDER, LYOPHILIZED, FOR SOLUTION INTRAVENOUS at 10:42

## 2021-08-10 RX ADMIN — PROPOFOL 100 MG: 10 INJECTION, EMULSION INTRAVENOUS at 18:32

## 2021-08-10 RX ADMIN — FENTANYL CITRATE 50 MCG: 50 INJECTION, SOLUTION INTRAMUSCULAR; INTRAVENOUS at 17:31

## 2021-08-10 RX ADMIN — HYDROCODONE BITARTRATE AND ACETAMINOPHEN 1 TABLET: 5; 325 TABLET ORAL at 12:09

## 2021-08-10 RX ADMIN — SODIUM CHLORIDE, POTASSIUM CHLORIDE, SODIUM LACTATE AND CALCIUM CHLORIDE 9 ML/HR: 600; 310; 30; 20 INJECTION, SOLUTION INTRAVENOUS at 17:31

## 2021-08-10 RX ADMIN — HYDROMORPHONE HYDROCHLORIDE 0.5 MG: 1 INJECTION, SOLUTION INTRAMUSCULAR; INTRAVENOUS; SUBCUTANEOUS at 22:13

## 2021-08-10 RX ADMIN — BUDESONIDE AND FORMOTEROL FUMARATE DIHYDRATE 2 PUFF: 160; 4.5 AEROSOL RESPIRATORY (INHALATION) at 10:58

## 2021-08-10 RX ADMIN — ANASTROZOLE 1 MG: 1 TABLET ORAL at 09:07

## 2021-08-10 RX ADMIN — SODIUM CHLORIDE, PRESERVATIVE FREE 10 ML: 5 INJECTION INTRAVENOUS at 09:13

## 2021-08-10 RX ADMIN — HYDROMORPHONE HYDROCHLORIDE 0.5 MG: 1 INJECTION, SOLUTION INTRAMUSCULAR; INTRAVENOUS; SUBCUTANEOUS at 20:16

## 2021-08-10 RX ADMIN — FENTANYL CITRATE 50 MCG: 50 INJECTION INTRAMUSCULAR; INTRAVENOUS at 18:32

## 2021-08-10 RX ADMIN — HYDROMORPHONE HYDROCHLORIDE 0.5 MG: 1 INJECTION, SOLUTION INTRAMUSCULAR; INTRAVENOUS; SUBCUTANEOUS at 04:28

## 2021-08-10 RX ADMIN — FENTANYL CITRATE 50 MCG: 50 INJECTION, SOLUTION INTRAMUSCULAR; INTRAVENOUS at 18:06

## 2021-08-10 RX ADMIN — SODIUM CHLORIDE, POTASSIUM CHLORIDE, SODIUM LACTATE AND CALCIUM CHLORIDE: 600; 310; 30; 20 INJECTION, SOLUTION INTRAVENOUS at 18:23

## 2021-08-10 RX ADMIN — HYDROMORPHONE HYDROCHLORIDE 0.5 MG: 1 INJECTION, SOLUTION INTRAMUSCULAR; INTRAVENOUS; SUBCUTANEOUS at 11:29

## 2021-08-10 RX ADMIN — HYDROMORPHONE HYDROCHLORIDE 0.5 MG: 1 INJECTION, SOLUTION INTRAMUSCULAR; INTRAVENOUS; SUBCUTANEOUS at 01:33

## 2021-08-10 RX ADMIN — GLYCOPYRROLATE 0.4 MG: 0.2 INJECTION INTRAMUSCULAR; INTRAVENOUS at 19:17

## 2021-08-10 RX ADMIN — FENTANYL CITRATE 50 MCG: 50 INJECTION INTRAMUSCULAR; INTRAVENOUS at 19:32

## 2021-08-10 RX ADMIN — HYDROMORPHONE HYDROCHLORIDE 0.5 MG: 1 INJECTION, SOLUTION INTRAMUSCULAR; INTRAVENOUS; SUBCUTANEOUS at 09:08

## 2021-08-10 RX ADMIN — HYDROCODONE BITARTRATE AND ACETAMINOPHEN 1 TABLET: 5; 325 TABLET ORAL at 22:49

## 2021-08-10 RX ADMIN — HYDROMORPHONE HYDROCHLORIDE 0.5 MG: 1 INJECTION, SOLUTION INTRAMUSCULAR; INTRAVENOUS; SUBCUTANEOUS at 13:49

## 2021-08-10 RX ADMIN — DEXAMETHASONE SODIUM PHOSPHATE 4 MG: 10 INJECTION INTRAMUSCULAR; INTRAVENOUS at 18:36

## 2021-08-10 RX ADMIN — ROCURONIUM BROMIDE 40 MG: 50 INJECTION INTRAVENOUS at 18:32

## 2021-08-10 RX ADMIN — METOPROLOL SUCCINATE 25 MG: 25 TABLET, EXTENDED RELEASE ORAL at 09:07

## 2021-08-10 RX ADMIN — FENTANYL CITRATE 50 MCG: 50 INJECTION, SOLUTION INTRAMUSCULAR; INTRAVENOUS at 20:15

## 2021-08-10 RX ADMIN — DIAZEPAM 2 MG: 2 TABLET ORAL at 09:07

## 2021-08-10 RX ADMIN — HYDROMORPHONE HYDROCHLORIDE 0.5 MG: 1 INJECTION, SOLUTION INTRAMUSCULAR; INTRAVENOUS; SUBCUTANEOUS at 06:53

## 2021-08-10 RX ADMIN — FENTANYL CITRATE 50 MCG: 50 INJECTION, SOLUTION INTRAMUSCULAR; INTRAVENOUS at 20:20

## 2021-08-10 NOTE — BRIEF OP NOTE
BREAST ABSCESS INCISION AND DRAINAGE  Progress Note    Jina FERNANDO Huddleston  8/10/2021    Pre-op Diagnosis:   L breast abscess       Post-Op Diagnosis Codes:   L breast abscess     Procedure/CPT® Codes:        Procedure(s):  LEFT BREAST ABSCESS INCISION AND DRAINAGE, REMOVAL OF EXPANDER    Surgeon(s):  Radha Guevara MD    Anesthesia: General    Staff:   Circulator: Leslie Das RN  Scrub Person: Asad Khan; Francesca Alvarez         Estimated Blood Loss:     Urine Voided: * No values recorded between 8/10/2021  6:22 PM and 8/10/2021  7:52 PM *    Specimens:                Specimens     ID Source Type Tests Collected By Collected At Frozen?    1 Breast, Left Tissue · ANAEROBIC CULTURE  · TISSUE / BONE CULTURE   Radha Guevara MD 8/10/21 1905     Description: Left mastectomy scar    2 Breast, Left Wound · ANAEROBIC CULTURE  · WOUND CULTURE   Radha Guevara MD 8/10/21 1907     Description: Left Breast wound    3 Breast, Left Wound · ANAEROBIC CULTURE  · WOUND CULTURE   Radha Guevara MD 8/10/21 1909     Description: Left Breast wound    A Breast, Left Tissue · TISSUE PATHOLOGY EXAM   Radha Guevara MD 8/10/21 1907     Description: Left Breast Scrappings                Drains:   Closed/Suction Drain 1 Left Breast Bulb 15 Fr. (Active)       [REMOVED] Closed/Suction Drain 1 Left Breast Bulb 15 Fr. (Removed)       Findings: L breast thinning of inferior flap with hard granulation lining     Complications: none          Radha Guevara MD     Date: 8/10/2021  Time: 19:55 EDT

## 2021-08-10 NOTE — PROGRESS NOTES
INFECTIOUS DISEASES PROGRESS NOTE    CC: f/u abscess    S:   Plan for I&D today.  She is little bit anxious about this.  Swelling and pain are stable.  No fevers or chills but does have hot flashes    O:  Physical Exam:  Temp:  [97.1 °F (36.2 °C)-98.2 °F (36.8 °C)] 97.1 °F (36.2 °C)  Heart Rate:  [87-96] 94  Resp:  [16-18] 16  BP: (146-160)/(82-96) 149/83  Physical Exam  Pulmonary:      Effort: Pulmonary effort is normal.   Skin:     General: Skin is warm and dry.      Comments: Swelling over l breast with saturated dressing   Neurological:      Mental Status: She is alert.     Diagnostics:    WBC 4.85  hgb 13  Cr 0.7  Alt 142  ast 74  Wound cx: NGTD  BCx NGTD    Assessment/Plan   1.  Left breast abscess  2.  Valvular heart disease, follows with Dr. Carroll and has normal ejection fraction and mild to moderate mitral valve stenosis and moderate MR  3.  Pulmonary fibrosis, likely secondary to prior radiation  4.  Mild hepatitis     Wound cx neg, probably sterilized from prior bactrim use  LFTs stable  Cont vanc and will tailor based on cx and operative findings

## 2021-08-10 NOTE — ANESTHESIA PREPROCEDURE EVALUATION
Anesthesia Evaluation     Patient summary reviewed                Airway   Mallampati: II  Neck ROM: full  No difficulty expected  Dental    (+) upper dentures    Pulmonary    (+) asthma,  Cardiovascular     ECG reviewed  Rhythm: regular    (+) valvular problems/murmurs AI and MR,       Neuro/Psych  (+) psychiatric history Bipolar,     GI/Hepatic/Renal/Endo      Musculoskeletal     Abdominal    Substance History      OB/GYN          Other      history of cancer remission                    Anesthesia Plan    ASA 3     general       Anesthetic plan, all risks, benefits, and alternatives have been provided, discussed and informed consent has been obtained with: patient.  Use of blood products discussed with patient .

## 2021-08-10 NOTE — PROGRESS NOTES
"Pharmacokinetic Evaluation - Vancomycin    Jina Huddleston is a 46 y.o. female on vancomycin pharmacy to dose.  MRN: [id number]  : 1975    Day of vancomycin therapy: 4  Indication: Skin and soft tissue infection  Consulted by: Dr. Holcomb/DUANE Jones - Dr. Monroy seeing for ID    Goal AUC: 400-600 mg/L*hr  *AUC will be targeted in this patient since it is a better measure of antibiotic exposure and allows for more appropriate balance of safety and efficacy. Since AUC is a total measure of drug exposure over the dosing interval, a therapeutic AUC may be reached even in patients with a trough lower than the traditional goals of 10-20 mg/L.*    Current dose: 1500 mg IV Q24  Other antimicrobials: none      Blood pressure 137/78, pulse 88, temperature 97.1 °F (36.2 °C), temperature source Oral, resp. rate 16, height 162.6 cm (64\"), weight 80.7 kg (177 lb 14.6 oz), SpO2 99 %, not currently breastfeeding.  Results from last 7 days   Lab Units 08/10/21  0709 21  0723 21  0949   CREATININE mg/dL 0.70 0.80 0.93     Estimated Creatinine Clearance: 101 mL/min (by C-G formula based on SCr of 0.7 mg/dL).  Results from last 7 days   Lab Units 08/10/21  0709 21  0723 21  0948   WBC 10*3/mm3 4.85 5.53 5.64   HEMOGLOBIN g/dL 13.2 13.1 14.6   HEMATOCRIT % 39.7 37.4 43.0   PLATELETS 10*3/mm3 171 179 200       Other relevant labs/chart info: 45 YO F with hx of Hodgkin's lymphoma now with breast cancer s/p bilateral mastectomy May 2020; seen by plastics with expanders placed after. Pt with initial swelling of surgical site and cx + Staph epi s/p txn with linezolid in 2020. Expander replaced in 2021; admitted with 2 wk hx of redness at the incision site, swelling, tenderness and yellow drainage.     Cultures:    Blood cx: NGTD   Wound cx: NGTD    Dosing hx (include troughs if drawn):      PK Parameters (Patient specific):  Cl = 4.96 L/hr  Vc = 63.4 L  T 1/2 = 10.9 " hr    Assessment:  Using the AVIA Bayesian software, the current regimen of 1500 q24 predicts an AUC of 308 mg/L*hr and trough = 6.6 mg/L. Measured trough today was 8.5. drawn at 21.75 hr. Increasing the dose to 1250 mg IV q12 would provide an AUC of 504 and estimated trough of 14.7.    Plan:  1) Increase to  vancomycin 1250 mg every 12 hours.  2) Next trough on Friday 8/13 at 0930 after 5 total doses.  3) Monitor for s/sxns of vancomycin toxicity including changes in UOP/SCr; encourage hydration as tolerated to decrease risk of toxic accumulation.    Thank you for this opportunity to review this patient,    Ev Mast, Pharm.D., [facility]  Oncology Pharmacy Specialist  [phone]

## 2021-08-10 NOTE — PLAN OF CARE
Goal Outcome Evaluation:  Plan of Care Reviewed With: patient        Progress: improving  Outcome Summary: AOx4, up ad vitaly. L breast abscess produced yellow milky drainage throughout day. Vancomycin x1. PRN Zofran, Dilaudid, Norco, Valium. Currently in surgery. Will continue to monitor.

## 2021-08-10 NOTE — PLAN OF CARE
"Goal Outcome Evaluation:  Plan of Care Reviewed With: patient        Progress: no change    Pt continues to express grief related to need to remove breast expander. Pt wishes to speak with surgeon further about weighing pros and cons of removing both expanders or just the left. Dilaudid administered multiple times over the shift. Pt completes self dressing changes to left breast. Pt reported late in the night that it \"leaked everywhere\" but this was not visualized by staff and the patient had applied a new Mepalex dressing.   "

## 2021-08-10 NOTE — ANESTHESIA PROCEDURE NOTES
Airway  Urgency: elective    Date/Time: 8/10/2021 6:33 PM  Airway not difficult    General Information and Staff    Patient location during procedure: OR  Anesthesiologist: Sia Torre MD    Indications and Patient Condition  Indications for airway management: airway protection    Preoxygenated: yes  MILS maintained throughout  Mask difficulty assessment: 1 - vent by mask    Final Airway Details  Final airway type: endotracheal airway      Successful airway: ETT  Cuffed: yes   Successful intubation technique: direct laryngoscopy  Blade: Bebeto  Blade size: 3  ETT size (mm): 7.0  Cormack-Lehane Classification: grade I - full view of glottis  Placement verified by: chest auscultation and capnometry   Measured from: gums  ETT/EBT to gums (cm): 21  Number of attempts at approach: 1  Assessment: lips, teeth, and gum same as pre-op and atraumatic intubation

## 2021-08-10 NOTE — CONSULTS
CC: L infected breast tissue expander after mastectomy/reconstruction    47 yo F with ho breat CA and Bilateral mastectomies with expander based reconstruction and history of lymphoma s/p radiation to chest years ago. She had a failed reconstruction with tissue expander and had a second placed that was doing well until last week. She was in fact scheduled for a second stage expander to implant placement in June, but this was deferred while she had a workup and clearance for CHF. She had an echo the week prior and was cleared for surgery. Thursday she felt like the expander had flipped and caused discomfort and a bruise like appearance of the lower left breast. She was placed on bactrim later that day and developed increased swelling and discomfort of the left breast over the weekend and came to [facility] Sunday and was admitted. Cultures were taken and she was seen by ID and placed on VAnc. She had a history of MRSA in the past. She has improved some in the hospital, but still has open draining wound and is presenting for surgical washout and possible expander removal today in the OR.     97.1  86  16  153/89  98%    A O x PERRL breathing nonlabored, patient is in no distress.   L breast red, tender and draining in 2 locations     A/P - L breast abscess around L tissue expander in previously radiated area. She has been on antibiotics since Thursday and IV since Sunday. Although she has clinically improved, the wound is open and draining still and I recommend going to the operating room to irrigate culture and washout the L breast pocket and possibly remove the expander. The patient has consented to this in the preop holding area witnessed by the preop nursing.

## 2021-08-10 NOTE — PROGRESS NOTES
Name: Jina Huddleston ADMIT: 2021   : 1975  PCP: Dalton Salinas APRN    MRN: [id number] LOS: 3 days   AGE/SEX: 46 y.o. female  ROOM: [facility]     Subjective   Subjective   Patient reports worsening of the pain of the left breast but reports that there is no change in the redness hotness swelling and discharge from the left breast.  No fever or chills.  No night sweats    Review of Systems    Cardiovascular/respiratory.  No chest pain.  Positive shortness of breath.  No cough.  No wheeze.  No hemoptysis.  GI.  Positive occasional nausea.  No vomiting.  No abdominal pain.  No bowel movement since admission.  .  No dysuria or hematuria.     Objective   Objective   Vital Signs  Temp:  [97.1 °F (36.2 °C)-97.8 °F (36.6 °C)] 97.1 °F (36.2 °C)  Heart Rate:  [82-95] 88  Resp:  [16-18] 16  BP: (137-157)/(78-96) 137/78  SpO2:  [97 %-100 %] 99 %  on  Flow (L/min):  [2] 2;   Device (Oxygen Therapy): nasal cannula    Intake/Output Summary (Last 24 hours) at 8/10/2021 1640  Last data filed at 2021 2200  Gross per 24 hour   Intake --   Output 800 ml   Net -800 ml     Body mass index is 30.54 kg/m².      21  0040   Weight: 80.7 kg (177 lb 14.6 oz)     Physical Exam    General.  Middle-aged female.  Alert and oriented x3.  In no pain or distress eyes.  Pupils equal round and reactive.  Intact extraocular musculature.  No pallor or jaundice.  Normal conjunctivae and lids.  Oral cavity.  Moist mucous membrane.  Neck.  Supple.  No JVD.  No lymphadenopathy or thyromegaly.  Cardiovascular.  Regular rate and rhythm.  Grade 2 systolic murmur.  Chest.  Clear bilateral air entry with no added sounds.  Breasts.  Scars both breasts.  Expanders in place.  Dressed left breast wound today.  No axillary lymphadenopathy.  There appears to be decreased redness hotness and swelling of the left breast.  Abdomen.  Soft lax.  No tenderness.  No organomegaly.  No guarding or rebound.  Extremities.  No clubbing cyanosis or  edema.    Results Review:      Results from last 7 days   Lab Units 08/10/21  0709 08/09/21  0723 08/08/21  0949 08/07/21  2104   SODIUM mmol/L 139 139 140 138   POTASSIUM mmol/L 4.0 4.8 3.9 4.1   CHLORIDE mmol/L 103 102 105 100   CO2 mmol/L 25.8 29.0 23.4 25.1   BUN mg/dL 10 12 15 15   CREATININE mg/dL 0.70 0.80 0.93 1.10*   GLUCOSE mg/dL 84 100* 91 83   CALCIUM mg/dL 9.1 9.2 8.9 9.9   AST (SGOT) U/L 74* 75* 87* 94*   ALT (SGPT) U/L 142* 140* 152* 162*     Estimated Creatinine Clearance: 101 mL/min (by C-G formula based on SCr of 0.7 mg/dL).                  Results from last 7 days   Lab Units 08/08/21  0949   TSH uIU/mL 7.240*           Invalid input(s):  PHOS        Invalid input(s): LDLCALC  Results from last 7 days   Lab Units 08/10/21  0709 08/09/21  0723 08/09/21  0723 08/08/21  0948 08/08/21  0948 08/07/21  2104 08/07/21  2104   WBC 10*3/mm3 4.85  --  5.53  --  5.64  --  8.03   HEMOGLOBIN g/dL 13.2  --  13.1  --  14.6  --  15.7   HEMATOCRIT % 39.7  --  37.4  --  43.0  --  44.4   PLATELETS 10*3/mm3 171  --  179  --  200  --  219   MCV fL 109.7*   < > 106.3*   < > 107.2*   < > 107.5*   MCH pg 36.5*   < > 37.2*   < > 36.4*   < > 38.0*   MCHC g/dL 33.2   < > 35.0   < > 34.0   < > 35.4   RDW % 13.0   < > 13.0   < > 13.0   < > 13.4   RDW-SD fl 53.2   < > 50.7   < > 51.0   < > 53.4   MPV fL 10.2   < > 10.2   < > 10.3   < > 10.7   NEUTROPHIL % %  --   --  67.2   < > 57.5   < > 60.4   LYMPHOCYTE % %  --   --  20.8   < > 30.1   < > 28.5   MONOCYTES % %  --   --  6.0   < > 6.7   < > 7.1   EOSINOPHIL % %  --   --  4.5   < > 4.1   < > 2.9   BASOPHIL % %  --   --  1.1   < > 1.2   < > 0.9   IMM GRAN % %  --   --   --   --  0.4   < > 0.2   NEUTROS ABS 10*3/mm3  --   --  3.72   < > 3.24   < > 4.85   LYMPHS ABS 10*3/mm3  --   --  1.15   < > 1.70   < > 2.29   MONOS ABS 10*3/mm3  --   --  0.33   < > 0.38   < > 0.57   EOS ABS 10*3/mm3  --   --  0.25   < > 0.23   < > 0.23   BASOS ABS 10*3/mm3  --   --  0.06   < > 0.07   < > 0.07    IMMATURE GRANS (ABS) 10*3/mm3  --   --   --   --  0.02   < > 0.02   NRBC /100 WBC  --   --   --   --  0.0   < > 0.0    < > = values in this interval not displayed.     Results from last 7 days   Lab Units 08/08/21  0949   INR  0.95         Results from last 7 days   Lab Units 08/08/21  0948 08/07/21  2104   PROCALCITONIN ng/mL  --  0.04   LACTATE mmol/L 1.5 1.4     Results from last 7 days   Lab Units 08/07/21  2104   CRP mg/dL <0.30         Results from last 7 days   Lab Units 08/07/21  2207 08/07/21  2104   BLOODCX   --  No growth at 2 days  No growth at 2 days   WOUNDCX  No growth at 3 days  --                    Imaging:  Imaging Results (Last 24 Hours)     ** No results found for the last 24 hours. 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           · Left breast abscess in a patient with a history of left breast cancer s/p bilateral mastectomy and left breast expander insertion.  Patient with a previous left breast expander infection with MRSA infection status post removal.  Lactic acid and pro calcitonin are normal.  No clinical evidence of  sepsis with no fever and normal white count.  Wound cultures and blood cultures are negative till now..  ID following.  Currently on IV vancomycin.  Surgery planning removal of the left breast expander today.  Continue Arimidex.  · History of Hodgkin's lymphoma/macrocytosis..  Normal CBC except macrocytosis.  Normal  B12/folate/cortisol.  Elevated TSH (look below)   · Elevated TSH.  Normal free T4 and total and free T4.  This is indicative of sick thyroid syndrome.  Needs follow-up as an outpatient.  · History of chronic diastolic congestive heart failure/MS/MR.  Does not appear to have angina or congestive heart failure at this time.  Resolved dyspnea   Appears euvolemic.  Good blood pressure control.  Continue Toprol.  · Transaminitis.  Hepatitis panel is negative.  Benign GI examination.  Right upper quadrant ultrasound was negative.  Mostly medication use.  Stable liver function test.  Will monitor.  · Radiation pulmonary fibrosis.  Stable respiratory status.  · Acute kidney failure.  Mostly prerenal azotemia.  This has resolved with IV fluid.  Patient euvolemic.  IV fluid DC'd.  Lasix on hold at this time.  Stable renal function.    · VTE prophylaxis.  Sequential compression devices.      Discussed discussed my findings and plan of treatment with the patient.  Disposition.  To be determined based on clinical course.    Tra Holcomb MD  [facility]  08/10/21  16:40 EDT

## 2021-08-11 LAB
ALBUMIN SERPL-MCNC: 4 G/DL (ref 3.5–5.2)
ALBUMIN/GLOB SERPL: 1.6 G/DL
ALP SERPL-CCNC: 113 U/L (ref 39–117)
ALT SERPL W P-5'-P-CCNC: 156 U/L (ref 1–33)
ANION GAP SERPL CALCULATED.3IONS-SCNC: 11.7 MMOL/L (ref 5–15)
AST SERPL-CCNC: 81 U/L (ref 1–32)
BILIRUB SERPL-MCNC: 0.8 MG/DL (ref 0–1.2)
BUN SERPL-MCNC: 9 MG/DL (ref 6–20)
BUN/CREAT SERPL: 11.3 (ref 7–25)
CALCIUM SPEC-SCNC: 9.3 MG/DL (ref 8.6–10.5)
CHLORIDE SERPL-SCNC: 101 MMOL/L (ref 98–107)
CO2 SERPL-SCNC: 25.3 MMOL/L (ref 22–29)
CREAT SERPL-MCNC: 0.8 MG/DL (ref 0.57–1)
DEPRECATED RDW RBC AUTO: 51.8 FL (ref 37–54)
ERYTHROCYTE [DISTWIDTH] IN BLOOD BY AUTOMATED COUNT: 13.1 % (ref 12.3–15.4)
GFR SERPL CREATININE-BSD FRML MDRD: 77 ML/MIN/1.73
GLOBULIN UR ELPH-MCNC: 2.5 GM/DL
GLUCOSE SERPL-MCNC: 112 MG/DL (ref 65–99)
HCT VFR BLD AUTO: 37 % (ref 34–46.6)
HGB BLD-MCNC: 12.8 G/DL (ref 12–15.9)
MCH RBC QN AUTO: 37.2 PG (ref 26.6–33)
MCHC RBC AUTO-ENTMCNC: 34.6 G/DL (ref 31.5–35.7)
MCV RBC AUTO: 107.6 FL (ref 79–97)
PLATELET # BLD AUTO: 209 10*3/MM3 (ref 140–450)
PMV BLD AUTO: 10.2 FL (ref 6–12)
POTASSIUM SERPL-SCNC: 4.3 MMOL/L (ref 3.5–5.2)
PROT SERPL-MCNC: 6.5 G/DL (ref 6–8.5)
RBC # BLD AUTO: 3.44 10*6/MM3 (ref 3.77–5.28)
SODIUM SERPL-SCNC: 138 MMOL/L (ref 136–145)
WBC # BLD AUTO: 7.24 10*3/MM3 (ref 3.4–10.8)

## 2021-08-11 PROCEDURE — 25010000002 VANCOMYCIN 10 G RECONSTITUTED SOLUTION: Performed by: NURSE PRACTITIONER

## 2021-08-11 PROCEDURE — 85027 COMPLETE CBC AUTOMATED: CPT | Performed by: NURSE PRACTITIONER

## 2021-08-11 PROCEDURE — 80053 COMPREHEN METABOLIC PANEL: CPT | Performed by: NURSE PRACTITIONER

## 2021-08-11 PROCEDURE — 99232 SBSQ HOSP IP/OBS MODERATE 35: CPT | Performed by: INTERNAL MEDICINE

## 2021-08-11 PROCEDURE — 94799 UNLISTED PULMONARY SVC/PX: CPT

## 2021-08-11 PROCEDURE — 25010000002 HYDROMORPHONE 1 MG/ML SOLUTION: Performed by: INTERNAL MEDICINE

## 2021-08-11 PROCEDURE — 25010000002 HYDROMORPHONE PER 4 MG: Performed by: NURSE PRACTITIONER

## 2021-08-11 PROCEDURE — 25010000002 ONDANSETRON PER 1 MG: Performed by: NURSE PRACTITIONER

## 2021-08-11 RX ORDER — HYDROCODONE BITARTRATE AND ACETAMINOPHEN 7.5; 325 MG/1; MG/1
1 TABLET ORAL EVERY 4 HOURS PRN
Status: DISCONTINUED | OUTPATIENT
Start: 2021-08-11 | End: 2021-08-12 | Stop reason: HOSPADM

## 2021-08-11 RX ORDER — NALOXONE HCL 0.4 MG/ML
0.4 VIAL (ML) INJECTION
Status: DISCONTINUED | OUTPATIENT
Start: 2021-08-11 | End: 2021-08-12 | Stop reason: HOSPADM

## 2021-08-11 RX ORDER — HYDROCODONE BITARTRATE AND ACETAMINOPHEN 7.5; 325 MG/1; MG/1
2 TABLET ORAL EVERY 4 HOURS PRN
Status: DISCONTINUED | OUTPATIENT
Start: 2021-08-11 | End: 2021-08-12 | Stop reason: HOSPADM

## 2021-08-11 RX ORDER — HYDROCODONE BITARTRATE AND ACETAMINOPHEN 5; 325 MG/1; MG/1
1 TABLET ORAL EVERY 4 HOURS PRN
Status: DISCONTINUED | OUTPATIENT
Start: 2021-08-11 | End: 2021-08-11

## 2021-08-11 RX ADMIN — Medication 500 MG: at 08:44

## 2021-08-11 RX ADMIN — HYDROMORPHONE HYDROCHLORIDE 0.5 MG: 1 INJECTION, SOLUTION INTRAMUSCULAR; INTRAVENOUS; SUBCUTANEOUS at 04:42

## 2021-08-11 RX ADMIN — PANTOPRAZOLE SODIUM 40 MG: 40 TABLET, DELAYED RELEASE ORAL at 17:31

## 2021-08-11 RX ADMIN — HYDROMORPHONE HYDROCHLORIDE 0.5 MG: 1 INJECTION, SOLUTION INTRAMUSCULAR; INTRAVENOUS; SUBCUTANEOUS at 02:26

## 2021-08-11 RX ADMIN — OXYCODONE HYDROCHLORIDE AND ACETAMINOPHEN 250 MG: 500 TABLET ORAL at 08:43

## 2021-08-11 RX ADMIN — HYDROCODONE BITARTRATE AND ACETAMINOPHEN 1 TABLET: 5; 325 TABLET ORAL at 05:07

## 2021-08-11 RX ADMIN — HYDROMORPHONE HYDROCHLORIDE 0.5 MG: 1 INJECTION, SOLUTION INTRAMUSCULAR; INTRAVENOUS; SUBCUTANEOUS at 06:47

## 2021-08-11 RX ADMIN — DIAZEPAM 2 MG: 2 TABLET ORAL at 12:08

## 2021-08-11 RX ADMIN — FAMOTIDINE 20 MG: 20 TABLET, FILM COATED ORAL at 08:44

## 2021-08-11 RX ADMIN — HYDROMORPHONE HYDROCHLORIDE 0.5 MG: 1 INJECTION, SOLUTION INTRAMUSCULAR; INTRAVENOUS; SUBCUTANEOUS at 17:31

## 2021-08-11 RX ADMIN — ONDANSETRON 4 MG: 2 INJECTION INTRAMUSCULAR; INTRAVENOUS at 21:39

## 2021-08-11 RX ADMIN — HYDROCODONE BITARTRATE AND ACETAMINOPHEN 1 TABLET: 5; 325 TABLET ORAL at 17:31

## 2021-08-11 RX ADMIN — VANCOMYCIN HYDROCHLORIDE 1250 MG: 10 INJECTION, POWDER, LYOPHILIZED, FOR SOLUTION INTRAVENOUS at 21:44

## 2021-08-11 RX ADMIN — VANCOMYCIN HYDROCHLORIDE 1250 MG: 10 INJECTION, POWDER, LYOPHILIZED, FOR SOLUTION INTRAVENOUS at 11:21

## 2021-08-11 RX ADMIN — HYDROCODONE BITARTRATE AND ACETAMINOPHEN 2 TABLET: 7.5; 325 TABLET ORAL at 21:38

## 2021-08-11 RX ADMIN — CETIRIZINE HYDROCHLORIDE 10 MG: 10 TABLET ORAL at 08:43

## 2021-08-11 RX ADMIN — HYDROMORPHONE HYDROCHLORIDE 0.5 MG: 1 INJECTION, SOLUTION INTRAMUSCULAR; INTRAVENOUS; SUBCUTANEOUS at 11:20

## 2021-08-11 RX ADMIN — ANASTROZOLE 1 MG: 1 TABLET ORAL at 08:43

## 2021-08-11 RX ADMIN — HYDROMORPHONE HYDROCHLORIDE 0.5 MG: 1 INJECTION, SOLUTION INTRAMUSCULAR; INTRAVENOUS; SUBCUTANEOUS at 00:15

## 2021-08-11 RX ADMIN — SODIUM CHLORIDE, PRESERVATIVE FREE 10 ML: 5 INJECTION INTRAVENOUS at 20:29

## 2021-08-11 RX ADMIN — METOPROLOL SUCCINATE 25 MG: 25 TABLET, EXTENDED RELEASE ORAL at 08:52

## 2021-08-11 RX ADMIN — BUDESONIDE AND FORMOTEROL FUMARATE DIHYDRATE 2 PUFF: 160; 4.5 AEROSOL RESPIRATORY (INHALATION) at 09:51

## 2021-08-11 RX ADMIN — HYDROMORPHONE HYDROCHLORIDE 0.5 MG: 1 INJECTION, SOLUTION INTRAMUSCULAR; INTRAVENOUS; SUBCUTANEOUS at 08:44

## 2021-08-11 RX ADMIN — DESVENLAFAXINE SUCCINATE 50 MG: 50 TABLET, EXTENDED RELEASE ORAL at 20:29

## 2021-08-11 RX ADMIN — HYDROMORPHONE HYDROCHLORIDE 1 MG: 1 INJECTION, SOLUTION INTRAMUSCULAR; INTRAVENOUS; SUBCUTANEOUS at 23:46

## 2021-08-11 RX ADMIN — HYDROCODONE BITARTRATE AND ACETAMINOPHEN 1 TABLET: 5; 325 TABLET ORAL at 11:30

## 2021-08-11 RX ADMIN — PANTOPRAZOLE SODIUM 40 MG: 40 TABLET, DELAYED RELEASE ORAL at 06:47

## 2021-08-11 RX ADMIN — HYDROMORPHONE HYDROCHLORIDE 0.5 MG: 1 INJECTION, SOLUTION INTRAMUSCULAR; INTRAVENOUS; SUBCUTANEOUS at 14:41

## 2021-08-11 RX ADMIN — SODIUM CHLORIDE, PRESERVATIVE FREE 10 ML: 5 INJECTION INTRAVENOUS at 08:49

## 2021-08-11 RX ADMIN — HYDROMORPHONE HYDROCHLORIDE 1 MG: 1 INJECTION, SOLUTION INTRAMUSCULAR; INTRAVENOUS; SUBCUTANEOUS at 20:29

## 2021-08-11 NOTE — PLAN OF CARE
"Goal Outcome Evaluation:  Plan of Care Reviewed With: patient        Progress: improving  Outcome Summary: AOx4, up ad vitaly. Pain TX w/ PRN Norco and Dilaudid. Anxiety TX w/ Valium. Pt requested to not see Dr. Guevara again, explained that she wanted Dr. Guevara to visit her before her surgery so that she could ask questions and see if the R expander or \"flap surgery\" could be done all at once, pt stated she felt rushed through questions and rushed into surgery. Dr. Wells has been consulted and plans to follow up with the patient tomorrow, pt has requested to no longer see Ismael and would like to see Priscilla from now on. Vancomycin x1. Currently resting in bed. Will continue to monitor.  "

## 2021-08-11 NOTE — ANESTHESIA POSTPROCEDURE EVALUATION
"Patient: Jina FERNANDO Huddleston    Procedure Summary     Date: 08/10/21 Room / Location: [facility] OR  / [facility] MAIN OR    Anesthesia Start: 1823 Anesthesia Stop: 1956    Procedure: LEFT BREAST ABSCESS INCISION AND DRAINAGE, REMOVAL OF EXPANDER (Left Breast) Diagnosis:     Surgeons: Radha Guevara MD Provider: Sia Torre MD    Anesthesia Type: general ASA Status: 3          Anesthesia Type: general    Vitals  Vitals Value Taken Time   /93 08/10/21 2051   Temp 36.4 °C (97.6 °F) 08/10/21 2050   Pulse 94 08/10/21 2102   Resp 14 08/10/21 2050   SpO2 98 % 08/10/21 2102   Vitals shown include unvalidated device data.        Post Anesthesia Care and Evaluation    Patient location during evaluation: bedside  Patient participation: complete - patient participated  Level of consciousness: awake  Pain management: adequate  Airway patency: patent  Anesthetic complications: No anesthetic complications    Cardiovascular status: acceptable  Respiratory status: acceptable  Hydration status: acceptable    Comments: /98 (BP Location: Right leg, Patient Position: Lying)   Pulse 102   Temp 36.1 °C (96.9 °F) (Oral)   Resp 16   Ht 162.6 cm (64\")   Wt 80.7 kg (177 lb 14.6 oz)   LMP  (LMP Unknown)   SpO2 96%   Breastfeeding No   BMI 30.54 kg/m²       "

## 2021-08-11 NOTE — PROGRESS NOTES
Name: Jina Huddleston ADMIT: 2021   : 1975  PCP: Dalton Salinas APRN    MRN: [id number] LOS: 4 days   AGE/SEX: 46 y.o. female  ROOM: [facility]     Subjective   Subjective   patient reports post operative pain, uncontrolled. tearful on my exam, unhappy with some of her care.     Review of Systems  denies chest pain, palpitations, SOA, edema, fever, chills, nausea, vomiting and diarrhea.      Objective   Objective   Vital Signs  Temp:  [96.8 °F (36 °C)-97.9 °F (36.6 °C)] 97.5 °F (36.4 °C)  Heart Rate:  [] 88  Resp:  [14-16] 16  BP: (123-178)/() 123/78  SpO2:  [90 %-100 %] 98 %  on  Flow (L/min):  [2-4] 2;   Device (Oxygen Therapy): room air    Intake/Output Summary (Last 24 hours) at 2021 1454  Last data filed at 2021 0900  Gross per 24 hour   Intake 1010 ml   Output 1920 ml   Net -910 ml     Body mass index is 30.54 kg/m².      21  0040   Weight: 80.7 kg (177 lb 14.6 oz)     Physical Exam  General.  Middle-aged female.  Alert and oriented x3.  In no pain or distress   Eyes.  Pupils equal round and reactive.  Intact extraocular musculature.  No pallor or jaundice.  Normal conjunctivae and lids.  Oral cavity.  Moist mucous membrane.  Neck.  Supple.  No JVD.  No lymphadenopathy or thyromegaly.  Cardiovascular.  Regular rate and rhythm.  cardiac murmur.  Chest.  Clear bilateral air entry with no added sounds.  Breasts.  Scars both breasts.  Expander in right. s/p left breast I&D with expander removal. TIFFANY drain in place with minimal sanguineous output.   Abdomen.  Soft lax.  No tenderness.  No organomegaly.  No guarding or rebound.  Extremities.  No clubbing cyanosis or edema.    Results Review:      Results from last 7 days   Lab Units 21  0722 08/10/21  0709 21  0723 21  0949 21  2882   SODIUM mmol/L 138 139 139 140 138   POTASSIUM mmol/L 4.3 4.0 4.8 3.9 4.1   CHLORIDE mmol/L 101 103 102 105 100   CO2 mmol/L 25.3 25.8 29.0 23.4 25.1   BUN mg/dL 9 10  12 15 15   CREATININE mg/dL 0.80 0.70 0.80 0.93 1.10*   GLUCOSE mg/dL 112* 84 100* 91 83   CALCIUM mg/dL 9.3 9.1 9.2 8.9 9.9   AST (SGOT) U/L 81* 74* 75* 87* 94*   ALT (SGPT) U/L 156* 142* 140* 152* 162*     Estimated Creatinine Clearance: 88.4 mL/min (by C-G formula based on SCr of 0.8 mg/dL).                  Results from last 7 days   Lab Units 08/08/21  0949   TSH uIU/mL 7.240*           Invalid input(s):  PHOS        Invalid input(s): LDLCALC  Results from last 7 days   Lab Units 08/11/21  0722 08/10/21  0709 08/10/21  0709 08/09/21  0723 08/09/21  0723 08/08/21  0948 08/08/21  0948 08/07/21  2104 08/07/21  2104   WBC 10*3/mm3 7.24  --  4.85  --  5.53  --  5.64  --  8.03   HEMOGLOBIN g/dL 12.8  --  13.2  --  13.1  --  14.6  --  15.7   HEMATOCRIT % 37.0  --  39.7  --  37.4  --  43.0  --  44.4   PLATELETS 10*3/mm3 209  --  171  --  179  --  200  --  219   MCV fL 107.6*   < > 109.7*   < > 106.3*   < > 107.2*   < > 107.5*   MCH pg 37.2*   < > 36.5*   < > 37.2*   < > 36.4*   < > 38.0*   MCHC g/dL 34.6   < > 33.2   < > 35.0   < > 34.0   < > 35.4   RDW % 13.1   < > 13.0   < > 13.0   < > 13.0   < > 13.4   RDW-SD fl 51.8   < > 53.2   < > 50.7   < > 51.0   < > 53.4   MPV fL 10.2   < > 10.2   < > 10.2   < > 10.3   < > 10.7   NEUTROPHIL % %  --   --   --   --  67.2   < > 57.5   < > 60.4   LYMPHOCYTE % %  --   --   --   --  20.8   < > 30.1   < > 28.5   MONOCYTES % %  --   --   --   --  6.0   < > 6.7   < > 7.1   EOSINOPHIL % %  --   --   --   --  4.5   < > 4.1   < > 2.9   BASOPHIL % %  --   --   --   --  1.1   < > 1.2   < > 0.9   IMM GRAN % %  --   --   --   --   --   --  0.4   < > 0.2   NEUTROS ABS 10*3/mm3  --   --   --   --  3.72   < > 3.24   < > 4.85   LYMPHS ABS 10*3/mm3  --   --   --   --  1.15   < > 1.70   < > 2.29   MONOS ABS 10*3/mm3  --   --   --   --  0.33   < > 0.38   < > 0.57   EOS ABS 10*3/mm3  --   --   --   --  0.25   < > 0.23   < > 0.23   BASOS ABS 10*3/mm3  --   --   --   --  0.06   < > 0.07   < > 0.07    IMMATURE GRANS (ABS) 10*3/mm3  --   --   --   --   --   --  0.02   < > 0.02   NRBC /100 WBC  --   --   --   --   --   --  0.0   < > 0.0    < > = values in this interval not displayed.     Results from last 7 days   Lab Units 08/08/21  0949   INR  0.95         Results from last 7 days   Lab Units 08/08/21  0948 08/07/21  2104   PROCALCITONIN ng/mL  --  0.04   LACTATE mmol/L 1.5 1.4     Results from last 7 days   Lab Units 08/07/21  2104   CRP mg/dL <0.30         Results from last 7 days   Lab Units 08/10/21  1909 08/10/21  1907 08/07/21  2207 08/07/21  2104   BLOODCX   --   --   --  No growth at 3 days  No growth at 3 days   WOUNDCX  No growth No growth No growth at 3 days  --                    Imaging:  Imaging Results (Last 24 Hours)     ** No results found for the last 24 hours. 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           · Left breast abscess in a patient with a history of left breast cancer s/p bilateral mastectomy and left breast expander insertion.  Patient with a previous left breast expander infection with MRSA infection status  post removal.  Lactic acid and pro calcitonin are normal.  No clinical evidence of sepsis with no fever and normal white count.  Wound cultures and blood cultures are negative thus far.  ID following and tailoring abx.  Currently on IV vancomycin.  s/p I&D of left breast with expander removal with TIFFANY drain in place 8/10/21. with uncontrolled post operative pain, increase frequency of norco to every 4 hours.   · History of Hodgkin's lymphoma/macrocytosis.  Normal CBC except macrocytosis.  Normal  B12/folate/cortisol.  Elevated TSH (look below)   · Elevated TSH.  Normal free T4 and total and free T4.  This is indicative of sick thyroid syndrome.  Needs follow-up as an outpatient.  · History of chronic diastolic congestive heart failure/MS/MR. Does not appear to have angina or congestive heart failure at this time.  Resolved dyspnea   Appears euvolemic.  Good blood pressure control.  Continue Toprol.  · Acute kidney failure.  Mostly prerenal azotemia.  This has resolved with IV fluid.  Patient euvolemic.  IV fluid DC'd.  Lasix on hold at this time.  Stable renal function.    · VTE prophylaxis.  Sequential compression devices.      Discussed discussed my findings and plan of treatment with the patient.  Disposition.  To be determined based on clinical course.    DUANE Lennon  [facility]  08/11/21  14:54 EDT

## 2021-08-11 NOTE — PAYOR COMM NOTE
"CONTINUED STAY REVIEW  REF #[id number]  F:  [phone]        Lew Shell (46 y.o. Female)     Date of Birth Social Security Number Address Home Phone MRN    1975  [address] [place] [id number] [phone] [id number]    [employer] Marital Status          Anabaptism        Admission Date Admission Type Admitting Provider Attending Provider Department, Room/Bed    8/7/21 Emergency Winston Rojo MD Baumann, Patrick D, MD [place] [id number]    Discharge Date Discharge Disposition Discharge Destination                       Attending Provider: Winston Rojo MD    Allergies: No Known Allergies    Isolation: None   Infection: None   Code Status: CPR    Ht: 162.6 cm (64\")   Wt: 80.7 kg (177 lb 14.6 oz)    Admission Cmt: None   Principal Problem: Left breast abscess [N61.1]                 Active Insurance as of 8/7/2021     Primary Coverage     Payor Plan Insurance Group Employer/Plan Group    [facility] BY AKIRA [facility] BY AKIRA [id number]     Payor Plan Address Payor Plan Phone Number Payor Plan Fax Number Effective Dates    [address]   1/1/2021 - None Entered    [facility] [id number]       Subscriber Name Subscriber Birth Date Member ID       LEW SHELL 1975 [id number]                 Emergency Contacts      (Rel.) Home Phone Work Phone Mobile Phone    ANNELISE HAMILTON (Daughter) [phone] -- --    Diane, Will (Significant Other) -- -- [phone]            Vital Signs (last day)     Date/Time   Temp   Temp src   Pulse   Resp   BP   Patient Position   SpO2    08/11/21 1115   97.5 (36.4)   Oral   88   16   123/78   Lying   98    08/11/21 0700   96.8 (36)   Oral   86   16   133/87   Lying   100    08/11/21 0438   96.9 (36.1)   Oral   102   16   170/98   Lying   96    08/10/21 2352   97.9 (36.6)   Oral   94   16   150/85   Lying   97    08/10/21 2115   97.3 (36.3)   Axillary   106   16   153/88   Lying   97    " 08/10/21 2050   97.6 (36.4)   Oral   80   14   126/93   --   94    08/10/21 2035   --   --   86   14   152/95   --   92    08/10/21 2020   --   --   108   14   166/100   --   93    08/10/21 2015   --   --   (!) 124   14   178/97   --   97    08/10/21 2010   --   --   112   14   155/100   --   94    08/10/21 2005   --   --   93   14   (!) 164/104   --   97    08/10/21 2000   --   --   99   14   154/94   --   90    08/10/21 1954   97.6 (36.4)   Oral   96   14   154/94   Lying   95    08/10/21 1710   --   Oral   86   16   153/89   Lying   98    08/10/21 1127   97.1 (36.2)   Oral   88   16   137/78   Lying   99    08/10/21 1100   --   --   82   16   --   --   --    08/10/21 1058   --   --   86   16   --   --   100    08/10/21 0920   --   --   --   --   --   --   100    08/10/21 0756   97.1 (36.2)   Oral   94   16   149/83   Lying   100    08/10/21 0436   97.8 (36.6)   Oral   90   18   146/82   Sitting   100              Oxygen Therapy (last day)     Date/Time   SpO2   Device (Oxygen Therapy)   Flow (L/min)   Oxygen Concentration (%)   ETCO2 (mmHg)    08/11/21 1115   98   room air   --   --   --    08/11/21 0844   --   room air   --   --   --    08/11/21 0700   100   room air   --   --   --    08/11/21 0438   96   room air   --   --   --    08/11/21 0106   --   nasal cannula   --   --   --    08/10/21 2352   97   nasal cannula   2   --   --    08/10/21 2115   97   nasal cannula   2   --   --    08/10/21 2050   94   nasal cannula   3   --   --    08/10/21 2035   92   nasal cannula   4   --   --    08/10/21 2020   93   nasal cannula   4   --   --    08/10/21 2015   97   nasal cannula   4   --   --    08/10/21 2010   94   nasal cannula   4   --   --    08/10/21 2005   97   nasal cannula   4   --   --    08/10/21 2000   90   nasal cannula   4   --   --    08/10/21 1954   95   nasal cannula   4   --   --    08/10/21 1710   98   room air   --   --   --    08/10/21 1127   99   nasal cannula   2   --   --    08/10/21 1058   100    nasal cannula   2   --   --    08/10/21 0920   100   nasal cannula   2   --   --    08/10/21 0756   100   nasal cannula   2   --   --    08/10/21 0436   100   room air   --   --   --              Lines, Drains & Airways    Active LDAs     Name:   Placement date:   Placement time:   Site:   Days:    Peripheral IV 08/07/21 2148 Right;Distal;Upper;Medial Arm   08/07/21 2148    Arm   3    Closed/Suction Drain 1 Left Breast Bulb 15 Fr.   08/10/21    1928    Breast   less than 1                Medication Administration Report for Jina Huddleston as of 08/11/21 1403    Legend:    Given Hold Not Given Due Canceled Entry Other Actions    Time Time (Time) Time  Time-Action       Discontinued     Completed     Future     MAR Hold     Linked           Medications 08/11/21    anastrozole (ARIMIDEX) tablet 1 mg  Dose: 1 mg  Freq: Daily Route: PO  Start: 08/08/21 0900    Admin Instructions:   Do Not Crush  HAZARDOUS - Handle with care     0843                    ascorbic acid (VITAMIN C) tablet 250 mg  Dose: 250 mg  Freq: Daily Route: PO  Start: 08/08/21 0900    0843 [C]                    budesonide-formoterol (SYMBICORT) 160-4.5 MCG/ACT inhaler 2 puff  Dose: 2 puff  Freq: 2 Times Daily Route: IN  Start: 08/08/21 0115    Admin Instructions:    Shake well.  Rinse mouth after use, do not swallow water.  Send aerosols to pharmacy in ziplock bag for proper disposal.     0951 2100                   calcium carbonate (oyster shell) tablet 500 mg  Dose: 500 mg  Freq: Daily Route: PO  Start: 08/08/21 0900    0844                    cetirizine (zyrTEC) tablet 10 mg  Dose: 10 mg  Freq: Daily Route: PO  Start: 08/08/21 0900    0843                    desvenlafaxine (PRISTIQ) 24 hr tablet 50 mg  Dose: 50 mg  Freq: Nightly Route: PO  Start: 08/08/21 0115    Admin Instructions:   Swallow whole. Do not crush, chew, or split.     2100                    famotidine (PEPCID) tablet 20 mg  Dose: 20 mg  Freq: Daily Route: PO  Start:  08/08/21 0900    0844                    metoprolol succinate XL (TOPROL-XL) 24 hr tablet 25 mg  Dose: 25 mg  Freq: Every 24 Hours Scheduled Route: PO  Start: 08/08/21 0900    Admin Instructions:   Do not crush or chew.     0852                    pantoprazole (PROTONIX) EC tablet 40 mg  Dose: 40 mg  Freq: 2 Times Daily Before Meals Route: PO  Start: 08/08/21 0730    Admin Instructions:   Swallow whole; do not crush, split, or chew.     0647     1730                   sodium chloride 0.9 % flush 10 mL  Dose: 10 mL  Freq: Every 12 Hours Scheduled Route: IV  Start: 08/07/21 2326    0849     2100                   vancomycin 1250 mg/250 mL 0.9% NS IVPB ([facility])  Dose: 1,250 mg  Freq: Every 12 Hours Route: IV  Indications of Use: SKIN AND SOFT TISSUE INFECTION  Start: 08/10/21 2200   End: 08/15/21 2159    1121     2200                  Completed Medications  Medications 08/11/21       midazolam (VERSED) injection 2 mg  Dose: 2 mg  Freq: Once Route: IV  Start: 08/10/21 2022   End: 08/10/21 2024    Admin Instructions:            morphine injection 4 mg  Dose: 4 mg  Freq: Once Route: IV  Start: 08/07/21 2102   End: 08/07/21 2205    Admin Instructions:   If given for pain, use the following pain scale:  Mild Pain = Pain Score of 1-3, CPOT 1-2  Moderate Pain = Pain Score of 4-6, CPOT 3-4  Severe Pain = Pain Score of 7-10, CPOT 5-8         sodium chloride 0.9 % bolus 1,000 mL  Dose: 1,000 mL  Freq: Once Route: IV  Start: 08/07/21 2058   End: 08/08/21 0005        vancomycin 1500 mg/500 mL 0.9% NS IVPB ([facility])  Dose: 20 mg/kg  Weight Dosing Info: 74.7 kg  Freq: Once Route: IV  Indications of Use: SKIN AND SOFT TISSUE INFECTION  Start: 08/07/21 2058   End: 08/08/21 0006       Discontinued Medications  Medications 08/11/21       furosemide (LASIX) tablet 20 mg  Dose: 20 mg  Freq: Daily Route: PO  Start: 08/08/21 0900   End: 08/08/21 1213        pantoprazole (PROTONIX) EC tablet 20 mg  Dose: 20 mg  Freq: 2 Times Daily Before Meals  Route: PO  Start: 08/08/21 0730   End: 08/08/21 0056    Admin Instructions:   Swallow whole; do not crush, split, or chew.         sodium chloride 0.9 % flush 10 mL  Dose: 10 mL  Freq: Every 12 Hours Scheduled Route: IV  Start: 08/10/21 2100   End: 08/10/21 2002        vancomycin 1500 mg/500 mL 0.9% NS IVPB ([facility])  Dose: 18.5 mg/kg  Weight Dosing Info: 74.7 kg  Freq: Every 24 Hours Route: IV  Indications of Use: SKIN AND SOFT TISSUE INFECTION  Start: 08/08/21 1000   End: 08/10/21 1118             ,   Medication Administration Report for Jina Huddleston as of 08/11/21 1403    Legend:    Given Hold Not Given Due Canceled Entry Other Actions    Time Time (Time) Time  Time-Action       Discontinued     Completed     Future     MAR Hold     Linked           Medications 08/11/21    Pharmacy to dose vancomycin  Freq: Continuous PRN Route: XX  PRN Reason: Consult  Indications of Use: SKIN AND SOFT TISSUE INFECTION  Start: 08/07/21 2322   End: 08/12/21 2321       Discontinued Medications  Medications 08/11/21       lactated ringers infusion  Rate: 9 mL/hr Dose: 9 mL/hr  Freq: Continuous PRN Route: IV  PRN Comment: Start prior to surgery  Start: 08/10/21 1718   End: 08/10/21 2113        sodium chloride 0.9 % infusion  Rate: 100 mL/hr Dose: 100 mL/hr  Freq: Continuous Route: IV  Start: 08/07/21 2336   End: 08/08/21 1038        sodium chloride 0.9 % infusion  Rate: 100 mL/hr Dose: 100 mL/hr  Freq: Continuous Route: IV  Start: 08/07/21 2333   End: 08/07/21 2334              and   Medication Administration Report for Jina Huddleston as of 08/11/21 1403    Legend:    Given Hold Not Given Due Canceled Entry Other Actions    Time Time (Time) Time  Time-Action       Discontinued     Completed     Future     MAR Hold     Linked           Medications 08/11/21    acetaminophen (TYLENOL) tablet 650 mg  Dose: 650 mg  Freq: Every 4 Hours PRN Route: PO  PRN Reason: Mild Pain   Start: 08/07/21 2323    Admin Instructions:   Do not exceed 4  grams of acetaminophen in a 24 hr period. Max dose of 2gm for AST/ALT greater than 120 units/L      If given for pain, use the following pain scale:   Mild Pain = Pain Score of 1-3, CPOT 1-2  Moderate Pain = Pain Score of 4-6, CPOT 3-4  Severe Pain = Pain Score of 7-10, CPOT 5-8        Or  acetaminophen (TYLENOL) 160 MG/5ML solution 650 mg  Dose: 650 mg  Freq: Every 4 Hours PRN Route: PO  PRN Reason: Mild Pain   Start: 08/07/21 2323    Admin Instructions:   Do not exceed 4 grams of acetaminophen in a 24 hr period. Max dose of 2gm for AST/ALT greater than 120 units/L      If given for pain, use the following pain scale:   Mild Pain = Pain Score of 1-3, CPOT 1-2  Moderate Pain = Pain Score of 4-6, CPOT 3-4  Severe Pain = Pain Score of 7-10, CPOT 5-8        Or  acetaminophen (TYLENOL) suppository 650 mg  Dose: 650 mg  Freq: Every 4 Hours PRN Route: RE  PRN Reason: Mild Pain   Start: 08/07/21 2323    Admin Instructions:   Do not exceed 4 grams of acetaminophen in a 24 hr period. Max dose of 2gm for AST/ALT greater than 120 units/L      If given for pain, use the following pain scale:   Mild Pain = Pain Score of 1-3, CPOT 1-2  Moderate Pain = Pain Score of 4-6, CPOT 3-4  Severe Pain = Pain Score of 7-10, CPOT 5-8         albuterol (PROVENTIL) nebulizer solution 0.083% 2.5 mg/3mL  Dose: 2.5 mg  Freq: Every 4 Hours PRN Route: NEBULIZATION  PRN Reason: Wheezing  Start: 08/08/21 0029        diazePAM (VALIUM) tablet 2 mg  Dose: 2 mg  Freq: 2 Times Daily PRN Route: PO  PRN Reason: Anxiety  Start: 08/08/21 0029    Admin Instructions:    Caution: Look alike/sound alike drug alert. Avoid use with Wye's Wort. Avoid grapefruit juice.     (9698) [C]     1207                   HYDROcodone-acetaminophen (NORCO) 5-325 MG per tablet 1 tablet  Dose: 1 tablet  Freq: Every 6 Hours PRN Route: PO  PRN Reason: Moderate Pain   Start: 08/10/21 1140   End: 08/17/21 1139    Admin Instructions:   [ROBINSON]    Do not exceed 4 grams of  acetaminophen in a 24 hr period. Max dose of 2gm for AST/ALT greater than 120 units/L        If given for pain, use the following pain scale:   Mild Pain = Pain Score of 1-3, CPOT 1-2  Moderate Pain = Pain Score of 4-6, CPOT 3-4  Severe Pain = Pain Score of 7-10, CPOT 5-8     0507     1130                   HYDROmorphone (DILAUDID) injection 0.5 mg  Dose: 0.5 mg  Freq: Every 2 Hours PRN Route: IV  PRN Reason: Severe Pain   Start: 08/07/21 2323   End: 08/14/21 2322    Admin Instructions:   If given for pain, use the following pain scale:  Mild Pain = Pain Score of 1-3, CPOT 1-2  Moderate Pain = Pain Score of 4-6, CPOT 3-4  Severe Pain = Pain Score of 7-10, CPOT 5-8     0015     0226     0442     0647     0844     1120              And  naloxone (NARCAN) injection 0.4 mg  Dose: 0.4 mg  Freq: Every 5 Minutes PRN Route: IV  PRN Reason: Respiratory Depression  Start: 08/07/21 2323    Admin Instructions:   If Respiratory Rate Less Than 8 or Patient is Difficult to Arouse, Stop ALL Narcotics & Contact Provider.  Administer Slow IV Push.  Repeat As Ordered Until Respiratory Rate is Greater Than 12.         ondansetron (ZOFRAN) injection 4 mg  Dose: 4 mg  Freq: Every 6 Hours PRN Route: IV  PRN Reasons: Nausea,Vomiting  Start: 08/07/21 2323    Admin Instructions:   If BOTH ondansetron (ZOFRAN) and promethazine (PHENERGAN) are ordered use ondansetron first and THEN promethazine IF ondansetron is ineffective.         Pharmacy to dose vancomycin  Freq: Continuous PRN Route: XX  PRN Reason: Consult  Indications of Use: SKIN AND SOFT TISSUE INFECTION  Start: 08/07/21 2322   End: 08/12/21 2321        sodium chloride 0.9 % flush 10 mL  Dose: 10 mL  Freq: As Needed Route: IV  PRN Reason: Line Care  Start: 08/07/21 2322        sodium chloride 0.9 % flush 10 mL  Dose: 10 mL  Freq: As Needed Route: IV  PRN Reason: Line Care  Start: 08/07/21 2054       Discontinued Medications  Medications 08/11/21       bupivacaine-EPINEPHrine PF  (MARCAINE w/EPI) 0.25% -[id number] injection  Freq: As Needed  Start: 08/10/21 1853   End: 08/10/21 1952        diphenhydrAMINE (BENADRYL) capsule 25 mg  Dose: 25 mg  Freq: Every 30 Minutes PRN Route: PO  PRN Reason: Itching  PRN Comment: May repeat x 1  Indications of Use: EXTRAPYRAMIDAL REACTION,PRURITUS  Start: 08/10/21 1950   End: 08/10/21 2113    Admin Instructions:   Caution: Look alike/sound alike drug alert. This med may be ordered in other forms and routes. Before giving verify the last time the drug was given by any route/form.           diphenhydrAMINE (BENADRYL) injection 12.5 mg  Dose: 12.5 mg  Freq: Every 15 Minutes PRN Route: IV  PRN Reason: Itching  PRN Comment: May repeat x 1  Start: 08/10/21 1950   End: 08/10/21 2113    Admin Instructions:   Caution: Look alike/sound alike drug alert. This med may be ordered in other forms and routes. Before giving verify the last time the drug was given by any route/form.           ePHEDrine injection 5 mg  Dose: 5 mg  Freq: Once As Needed Route: IV  PRN Comment: symptomatic hypotension - Notify attending anesthesiologist if this needs to be given  Start: 08/10/21 1950   End: 08/10/21 2113    Admin Instructions:   Caution: Look alike/sound alike drug alert   Dilute with NS to 5-10 mg/mL.  Central line preferred, if unavailable use large bore IV access with frequent nurse monitoring of IV site.         fentaNYL citrate (PF) (SUBLIMAZE) injection 50 mcg  Dose: 50 mcg  Freq: Every 5 Minutes PRN Route: IV  PRN Reasons: Moderate Pain ,Severe Pain   Start: 08/10/21 1950   End: 08/10/21 2113    Admin Instructions:   May alternate fentanyl with hydromorphone using fentanyl first.    Maximum total dose of fentanyl is 200 mcg.  If given for pain, use the following pain scale:  Mild Pain = Pain Score of 1-3, CPOT 1-2  Moderate Pain = Pain Score of 4-6, CPOT 3-4  Severe Pain = Pain Score of 7-10, CPOT 5-8         fentaNYL citrate (PF) (SUBLIMAZE) injection 50 mcg  Dose: 50  mcg  Freq: Every 10 Minutes PRN Route: IV  PRN Reason: Severe Pain   Start: 08/10/21 1718   End: 08/10/21 2002    Admin Instructions:   Maximum total dose of fentaNYL is 100 mcg.  If given for pain, use the following pain scale:  Mild Pain = Pain Score of 1-3, CPOT 1-2  Moderate Pain = Pain Score of 4-6, CPOT 3-4  Severe Pain = Pain Score of 7-10, CPOT 5-8         flumazenil (ROMAZICON) injection 0.2 mg  Dose: 0.2 mg  Freq: As Needed Route: IV  PRN Comment: for benzodiazepine induced unresponsiveness or sedation  Indications of Use: BENZODIAZEPINE-INDUCED SEDATION  Start: 08/10/21 1950   End: 08/10/21 2113    Admin Instructions:   Notify Anesthesia if given  ** give IV over 15-30 seconds **         hydrALAZINE (APRESOLINE) injection 5 mg  Dose: 5 mg  Freq: Every 10 Minutes PRN Route: IV  PRN Reason: High Blood Pressure  PRN Comment: for systolic blood pressure greater than 180 mmHg or diastolic blood pressure greater than 105 mmHg  Start: 08/10/21 1950   End: 08/10/21 2113    Admin Instructions:   Up to 20 mg.  Caution: Look alike/sound alike drug alert         HYDROcodone-acetaminophen (NORCO) 7.5-325 MG per tablet 1 tablet  Dose: 1 tablet  Freq: Once As Needed Route: PO  PRN Reason: Moderate Pain   Start: 08/10/21 1950   End: 08/10/21 2113    Admin Instructions:   [ROBINSON]    Do not exceed 4 grams of acetaminophen in a 24 hr period. Max dose of 2gm for AST/ALT greater than 120 units/L        If given for pain, use the following pain scale:   Mild Pain = Pain Score of 1-3, CPOT 1-2  Moderate Pain = Pain Score of 4-6, CPOT 3-4  Severe Pain = Pain Score of 7-10, CPOT 5-8         HYDROmorphone (DILAUDID) injection 0.5 mg  Dose: 0.5 mg  Freq: Every 5 Minutes PRN Route: IV  PRN Reasons: Moderate Pain ,Severe Pain   Start: 08/10/21 1950   End: 08/10/21 2113    Admin Instructions:   May alternate fentanyl with hydromorphone using fentanyl first.    Maximum total dose of hydromorphone is 2 mg.  If given for pain, use the  following pain scale:  Mild Pain = Pain Score of 1-3, CPOT 1-2  Moderate Pain = Pain Score of 4-6, CPOT 3-4  Severe Pain = Pain Score of 7-10, CPOT 5-8         ibuprofen (ADVIL,MOTRIN) tablet 600 mg  Dose: 600 mg  Freq: Once As Needed Route: PO  PRN Reason: Mild Pain   Start: 08/10/21 1950   End: 08/10/21 2113    Admin Instructions:   If given for pain, use the following pain scale:  Mild Pain = Pain Score of 1-3, CPOT 1-2  Moderate Pain = Pain Score of 4-6, CPOT 3-4  Severe Pain = Pain Score of 7-10, CPOT 5-8         labetalol (NORMODYNE,TRANDATE) injection 5 mg  Dose: 5 mg  Freq: Every 5 Minutes PRN Route: IV  PRN Reason: High Blood Pressure  PRN Comment: for systolic blood pressure greater than 180 mmHg or diastolic blood pressure greater than 105 mmHg  Start: 08/10/21 1950   End: 08/10/21 2113    Admin Instructions:   Hold for heart rate less than 60.  Give by slow IV Push each 20mg (or less) over 2 minutes         lactated ringers infusion  Rate: 9 mL/hr Dose: 9 mL/hr  Freq: Continuous PRN Route: IV  PRN Comment: Start prior to surgery  Start: 08/10/21 1718   End: 08/10/21 2113        midazolam (VERSED) injection 1 mg  Dose: 1 mg  Freq: Every 10 Minutes PRN Route: IV  PRN Comment: Anxiety prophylaxis, Pre-op comfort  Start: 08/10/21 1718   End: 08/10/21 2002    Admin Instructions:   May repeat dose in 10 minutes one time then contact provider for additional orders.           naloxone (NARCAN) injection 0.2 mg  Dose: 0.2 mg  Freq: As Needed Route: IV  PRN Reasons: Opioid Reversal,Respiratory Depression  PRN Comment: unresponsiveness, decrease oxygen saturation  Indications of Use: ACUTE RESPIRATORY FAILURE,OPIOID-INDUCED RESPIRATORY DEPRESSION  Start: 08/10/21 1950   End: 08/10/21 2113    Admin Instructions:   Notify Anesthesia if given         ondansetron (ZOFRAN) injection 4 mg  Dose: 4 mg  Freq: Once As Needed Route: IV  PRN Reasons: Nausea,Vomiting  Indications of Use: POSTOPERATIVE NAUSEA AND  VOMITING  Start: 08/10/21 1950   End: 08/10/21 2113    Admin Instructions:   If BOTH ondansetron (ZOFRAN) and promethazine (PHENERGAN) are ordered use ondansetron first and THEN promethazine IF ondansetron is ineffective.         oxyCODONE-acetaminophen (PERCOCET)  MG per tablet 1 tablet  Dose: 1 tablet  Freq: Every 4 Hours PRN Route: PO  PRN Reason: Severe Pain   Start: 08/10/21 1950   End: 08/10/21 2113    Admin Instructions:   [ROBINSON]    Do not exceed 4 grams of acetaminophen in a 24 hr period. Max dose of 2gm for AST/ALT greater than 120 units/L        If given for pain, use the following pain scale:   Mild Pain = Pain Score of 1-3, CPOT 1-2  Moderate Pain = Pain Score of 4-6, CPOT 3-4  Severe Pain = Pain Score of 7-10, CPOT 5-8         promethazine (PHENERGAN) suppository 25 mg  Dose: 25 mg  Freq: Once As Needed Route: RE  PRN Reasons: Nausea,Vomiting  Start: 08/10/21 1950   End: 08/10/21 2113    Admin Instructions:   If BOTH ondansetron (ZOFRAN) and promethazine (PHENERGAN) are ordered use ondansetron first and THEN promethazine IF ondansetron is ineffective.        Or  promethazine (PHENERGAN) tablet 25 mg  Dose: 25 mg  Freq: Once As Needed Route: PO  PRN Reasons: Nausea,Vomiting  Start: 08/10/21 1950   End: 08/10/21 2113    Admin Instructions:   If BOTH ondansetron (ZOFRAN) and promethazine (PHENERGAN) are ordered use ondansetron first and THEN promethazine IF ondansetron is ineffective.           sodium chloride 0.9 % flush 10 mL  Dose: 10 mL  Freq: As Needed Route: IV  PRN Reason: Line Care  Start: 08/10/21 1718   End: 08/10/21 2002        sodium chloride 1,000 mL with gentamicin 80 mg irrigation  Freq: As Needed  Start: 08/10/21 1853   End: 08/10/21 1952                 Orders (active)      Start     Ordered    08/13/21 0930  Vancomycin, Trough  Timed      08/10/21 1636    08/10/21 2209  Diet Regular; Cardiac  Diet Effective Now      08/10/21 2208    08/10/21 2200  vancomycin 1250 mg/250 mL 0.9% NS  IVPB ([facility])  Every 12 Hours      08/10/21 1118    08/10/21 1950  Pulse Oximetry, Continuous  Continuous      08/10/21 1949    08/10/21 1911  Tissue Pathology Exam  RELEASE UPON ORDERING      08/10/21 1911    08/10/21 1637  Basic Metabolic Panel  Daily      08/10/21 1636    08/10/21 1140  HYDROcodone-acetaminophen (NORCO) 5-325 MG per tablet 1 tablet  Every 6 Hours PRN      08/10/21 1140    08/09/21 1424  Opioid Administration - Document SpO2 Value With Each Set of Vitals & Any Change in Patient Status  Per Order Details      08/09/21 1424    08/09/21 1424  Opioid Administration - Notify Provider Pulse Oximetry (SpO2)  Until Discontinued      08/09/21 1424 08/08/21 0900  anastrozole (ARIMIDEX) tablet 1 mg  Daily      08/08/21 0029    08/08/21 0900  ascorbic acid (VITAMIN C) tablet 250 mg  Daily      08/08/21 0029    08/08/21 0900  calcium carbonate (oyster shell) tablet 500 mg  Daily      08/08/21 0029    08/08/21 0900  cetirizine (zyrTEC) tablet 10 mg  Daily      08/08/21 0029    08/08/21 0900  famotidine (PEPCID) tablet 20 mg  Daily      08/08/21 0029    08/08/21 0900  metoprolol succinate XL (TOPROL-XL) 24 hr tablet 25 mg  Every 24 Hours Scheduled      08/08/21 0029    08/08/21 0730  pantoprazole (PROTONIX) EC tablet 40 mg  2 Times Daily Before Meals      08/08/21 0056    08/08/21 0115  desvenlafaxine (PRISTIQ) 24 hr tablet 50 mg  Nightly      08/08/21 0029    08/08/21 0115  budesonide-formoterol (SYMBICORT) 160-4.5 MCG/ACT inhaler 2 puff  2 Times Daily      08/08/21 0029    08/08/21 0029  albuterol (PROVENTIL) nebulizer solution 0.083% 2.5 mg/3mL  Every 4 Hours PRN      08/08/21 0029    08/08/21 0029  diazePAM (VALIUM) tablet 2 mg  2 Times Daily PRN      08/08/21 0029    08/08/21 0000  Vital Signs  Every 4 Hours      08/07/21 2324    08/07/21 2330  Code Status and Medical Interventions:  Continuous      08/07/21 2329 08/07/21 2326  sodium chloride 0.9 % flush 10 mL  Every 12 Hours Scheduled      08/07/21  2324 08/07/21 2323  ondansetron (ZOFRAN) injection 4 mg  Every 6 Hours PRN      08/07/21 2324 08/07/21 2323  HYDROmorphone (DILAUDID) injection 0.5 mg  Every 2 Hours PRN      08/07/21 2324 08/07/21 2323  naloxone (NARCAN) injection 0.4 mg  Every 5 Minutes PRN      08/07/21 2324 08/07/21 2323  acetaminophen (TYLENOL) tablet 650 mg  Every 4 Hours PRN      08/07/21 2324 08/07/21 2323  acetaminophen (TYLENOL) 160 MG/5ML solution 650 mg  Every 4 Hours PRN      08/07/21 2324 08/07/21 2323  acetaminophen (TYLENOL) suppository 650 mg  Every 4 Hours PRN      08/07/21 2324 08/07/21 2323  Intake & Output  Every Shift      08/07/21 2324 08/07/21 2323  Insert Peripheral IV  Once      08/07/21 2324 08/07/21 2323  Maintain Sequential Compression Device  Continuous      08/07/21 2324 08/07/21 2322  Pharmacy to dose vancomycin  Continuous PRN      08/07/21 2324 08/07/21 2322  sodium chloride 0.9 % flush 10 mL  As Needed      08/07/21 2324 08/07/21 2055  Insert peripheral IV  Once      08/07/21 2056 08/07/21 2054  sodium chloride 0.9 % flush 10 mL  As Needed      08/07/21 2056    Unscheduled  Up With Assistance  As Needed      08/07/21 2324    Unscheduled  Oxygen Therapy- Nasal Cannula; Titrate for SPO2: per policy  Continuous PRN      08/10/21 1949                   Operative/Procedure Notes       Radha Guevara MD at 08/10/21 1851  Version 1 of 1         BREAST ABSCESS INCISION AND DRAINAGE  Progress Note    Jina Huddleston  8/10/2021    Pre-op Diagnosis:   L breast abscess       Post-Op Diagnosis Codes:   L breast abscess     Procedure/CPT® Codes:        Procedure(s):  LEFT BREAST ABSCESS INCISION AND DRAINAGE, REMOVAL OF EXPANDER    Surgeon(s):  Radha Guevara MD    Anesthesia: General    Staff:   Circulator: Leslie Das RN  Scrub Person: Asad Khan; Francesca Alvarez         Estimated Blood Loss:     Urine Voided: * No values recorded between 8/10/2021  6:22 PM and  8/10/2021  7:52 PM *    Specimens:                Specimens     ID Source Type Tests Collected By Collected At Frozen?    1 Breast, Left Tissue · ANAEROBIC CULTURE  · TISSUE / BONE CULTURE   Radha Guevara MD 8/10/21 1905     Description: Left mastectomy scar    2 Breast, Left Wound · ANAEROBIC CULTURE  · WOUND CULTURE   Radha Guevara MD 8/10/21 1907     Description: Left Breast wound    3 Breast, Left Wound · ANAEROBIC CULTURE  · WOUND CULTURE   Radha Guevara MD 8/10/21 1909     Description: Left Breast wound    A Breast, Left Tissue · TISSUE PATHOLOGY EXAM   Radha Guevara MD 8/10/21 1907     Description: Left Breast Scrappings                Drains:   Closed/Suction Drain 1 Left Breast Bulb 15 Fr. (Active)       [REMOVED] Closed/Suction Drain 1 Left Breast Bulb 15 Fr. (Removed)       Findings: L breast thinning of inferior flap with hard granulation lining     Complications: none          Radha Guevara MD     Date: 8/10/2021  Time: 19:55 EDT        Electronically signed by Radha Guevara MD at 08/10/21 2005          Physician Progress Notes      Nik Monroy MD at 08/11/21 0953        INFECTIOUS DISEASES PROGRESS NOTE    CC: f/u abscess    S:   Complain of postop pain after I and D yesterday.  No fevers or chills      O:  Physical Exam:  Temp:  [96.8 °F (36 °C)-97.9 °F (36.6 °C)] 96.8 °F (36 °C)  Heart Rate:  [] 86  Resp:  [14-16] 16  BP: (126-178)/() 133/87  Physical Exam  Pulmonary:      Effort: Pulmonary effort is normal.   Skin:     General: Skin is warm and dry.      Comments: Drain in place, L torso incision intact   Neurological:      Mental Status: She is alert.     Diagnostics:    Creatinine 0.8    AST 81  White count 7.24  Hemoglobin 12.8  Platelets 209  Wound cx: Negative  BCx NGTD  Operative cultures no growth to date  Assessment/Plan   1.  Left breast abscess  2.  Valvular heart disease, follows with Dr. Carroll and has normal  ejection fraction and mild to moderate mitral valve stenosis and moderate MR  3.  Pulmonary fibrosis, likely secondary to prior radiation  4.  Mild hepatitis     Status post I&D with removal yesterday.  Operative details pending.  Seems to be doing reasonably well.  Wound culture was negative and blood cultures no growth to date.  LFTs are stable.  Follow-up operative cultures but suspect those will be negative anticipate discharge on oral antibiotics soon              Electronically signed by Nik Monroy MD at 21 0955     Tra Holcomb MD at 08/10/21 1640              Name: Jina Huddleston ADMIT: 2021   : 1975  PCP: Dalton Salinas APRN    MRN: [id number] LOS: 3 days   AGE/SEX: 46 y.o. female  ROOM: [facility]     Subjective   Subjective   Patient reports worsening of the pain of the left breast but reports that there is no change in the redness hotness swelling and discharge from the left breast.  No fever or chills.  No night sweats    Review of Systems    Cardiovascular/respiratory.  No chest pain.  Positive shortness of breath.  No cough.  No wheeze.  No hemoptysis.  GI.  Positive occasional nausea.  No vomiting.  No abdominal pain.  No bowel movement since admission.  .  No dysuria or hematuria.    Objective   Objective   Vital Signs  Temp:  [97.1 °F (36.2 °C)-97.8 °F (36.6 °C)] 97.1 °F (36.2 °C)  Heart Rate:  [82-95] 88  Resp:  [16-18] 16  BP: (137-157)/(78-96) 137/78  SpO2:  [97 %-100 %] 99 %  on  Flow (L/min):  [2] 2;   Device (Oxygen Therapy): nasal cannula    Intake/Output Summary (Last 24 hours) at 8/10/2021 1640  Last data filed at 2021 2200  Gross per 24 hour   Intake --   Output 800 ml   Net -800 ml     Body mass index is 30.54 kg/m².      21  0040   Weight: 80.7 kg (177 lb 14.6 oz)     Physical Exam    General.  Middle-aged female.  Alert and oriented x3.  In no pain or distress eyes.  Pupils equal round and reactive.  Intact extraocular musculature.   No pallor or jaundice.  Normal conjunctivae and lids.  Oral cavity.  Moist mucous membrane.  Neck.  Supple.  No JVD.  No lymphadenopathy or thyromegaly.  Cardiovascular.  Regular rate and rhythm.  Grade 2 systolic murmur.  Chest.  Clear bilateral air entry with no added sounds.  Breasts.  Scars both breasts.  Expanders in place.  Dressed left breast wound today.  No axillary lymphadenopathy.  There appears to be decreased redness hotness and swelling of the left breast.  Abdomen.  Soft lax.  No tenderness.  No organomegaly.  No guarding or rebound.  Extremities.  No clubbing cyanosis or edema.    Results Review:      Results from last 7 days   Lab Units 08/10/21  0709 08/09/21  0723 08/08/21  0949 08/07/21  2104   SODIUM mmol/L 139 139 140 138   POTASSIUM mmol/L 4.0 4.8 3.9 4.1   CHLORIDE mmol/L 103 102 105 100   CO2 mmol/L 25.8 29.0 23.4 25.1   BUN mg/dL 10 12 15 15   CREATININE mg/dL 0.70 0.80 0.93 1.10*   GLUCOSE mg/dL 84 100* 91 83   CALCIUM mg/dL 9.1 9.2 8.9 9.9   AST (SGOT) U/L 74* 75* 87* 94*   ALT (SGPT) U/L 142* 140* 152* 162*     Estimated Creatinine Clearance: 101 mL/min (by C-G formula based on SCr of 0.7 mg/dL).                  Results from last 7 days   Lab Units 08/08/21  0949   TSH uIU/mL 7.240*           Invalid input(s):  PHOS        Invalid input(s): LDLCALC  Results from last 7 days   Lab Units 08/10/21  0709 08/09/21  0723 08/09/21  0723 08/08/21  0948 08/08/21  0948 08/07/21  2104 08/07/21  2104   WBC 10*3/mm3 4.85  --  5.53  --  5.64  --  8.03   HEMOGLOBIN g/dL 13.2  --  13.1  --  14.6  --  15.7   HEMATOCRIT % 39.7  --  37.4  --  43.0  --  44.4   PLATELETS 10*3/mm3 171  --  179  --  200  --  219   MCV fL 109.7*   < > 106.3*   < > 107.2*   < > 107.5*   MCH pg 36.5*   < > 37.2*   < > 36.4*   < > 38.0*   MCHC g/dL 33.2   < > 35.0   < > 34.0   < > 35.4   RDW % 13.0   < > 13.0   < > 13.0   < > 13.4   RDW-SD fl 53.2   < > 50.7   < > 51.0   < > 53.4   MPV fL 10.2   < > 10.2   < > 10.3   < > 10.7    NEUTROPHIL % %  --   --  67.2   < > 57.5   < > 60.4   LYMPHOCYTE % %  --   --  20.8   < > 30.1   < > 28.5   MONOCYTES % %  --   --  6.0   < > 6.7   < > 7.1   EOSINOPHIL % %  --   --  4.5   < > 4.1   < > 2.9   BASOPHIL % %  --   --  1.1   < > 1.2   < > 0.9   IMM GRAN % %  --   --   --   --  0.4   < > 0.2   NEUTROS ABS 10*3/mm3  --   --  3.72   < > 3.24   < > 4.85   LYMPHS ABS 10*3/mm3  --   --  1.15   < > 1.70   < > 2.29   MONOS ABS 10*3/mm3  --   --  0.33   < > 0.38   < > 0.57   EOS ABS 10*3/mm3  --   --  0.25   < > 0.23   < > 0.23   BASOS ABS 10*3/mm3  --   --  0.06   < > 0.07   < > 0.07   IMMATURE GRANS (ABS) 10*3/mm3  --   --   --   --  0.02   < > 0.02   NRBC /100 WBC  --   --   --   --  0.0   < > 0.0    < > = values in this interval not displayed.     Results from last 7 days   Lab Units 08/08/21  0949   INR  0.95         Results from last 7 days   Lab Units 08/08/21  0948 08/07/21  2104   PROCALCITONIN ng/mL  --  0.04   LACTATE mmol/L 1.5 1.4     Results from last 7 days   Lab Units 08/07/21  2104   CRP mg/dL <0.30         Results from last 7 days   Lab Units 08/07/21  2207 08/07/21  2104   BLOODCX   --  No growth at 2 days  No growth at 2 days   WOUNDCX  No growth at 3 days  --                    Imaging:  Imaging Results (Last 24 Hours)     ** No results found for the last 24 hours. 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           · Left breast abscess in a patient with a history of left breast cancer s/p bilateral mastectomy and left breast expander insertion.  Patient with a previous left breast expander infection with MRSA infection status post removal.  Lactic acid and pro calcitonin are normal.  No clinical evidence of sepsis with no fever and normal white count.  Wound cultures and blood cultures are negative till now..  ID following.  Currently on IV vancomycin.  Surgery planning removal of the left breast expander today.  Continue Arimidex.  · History of Hodgkin's lymphoma/macrocytosis..  Normal CBC except macrocytosis.  Normal  B12/folate/cortisol.  Elevated TSH (look below)   · Elevated TSH.  Normal free T4 and total and free T4.  This is indicative of sick thyroid syndrome.  Needs follow-up as an outpatient.  · History of chronic diastolic congestive heart failure/MS/MR.  Does not appear to have angina or congestive heart failure at this time.  Resolved dyspnea   Appears euvolemic.  Good blood pressure control.  Continue Toprol.  · Transaminitis.  Hepatitis panel is negative.  Benign GI examination.  Right upper quadrant ultrasound was negative.  Mostly medication use.  Stable liver function test.  Will monitor.  · Radiation pulmonary fibrosis.  Stable respiratory status.  · Acute kidney failure.  Mostly prerenal azotemia.  This has resolved with IV fluid.  Patient euvolemic.  IV fluid DC'd.  Lasix on hold at this time.  Stable renal function.    · VTE prophylaxis.  Sequential compression devices.      Discussed discussed my findings and plan of treatment with the patient.  Disposition.  To be determined based on clinical  course.    Tra Holcomb MD  [facility]  08/10/21  16:40 EDT          Electronically signed by Tra Holcomb MD at 08/10/21 1644          Consult Notes      Radha Guevara MD at 08/10/21 1820      Consult Orders    1. Inpatient Plastic Surgery Consult [[id number]] ordered by Winston Rooj MD at 08/07/21 2311             CC: L infected breast tissue expander after mastectomy/reconstruction    47 yo F with ho breat CA and Bilateral mastectomies with expander based reconstruction and history of lymphoma s/p radiation to chest years ago. She had a failed reconstruction with tissue expander and had a second placed that was doing well until last week. She was in fact scheduled for a second stage expander to implant placement in June, but this was deferred while she had a workup and clearance for CHF. She had an echo the week prior and was cleared for surgery. Thursday she felt like the expander had flipped and caused discomfort and a bruise like appearance of the lower left breast. She was placed on bactrim later that day and developed increased swelling and discomfort of the left breast over the weekend and came to [facility] and was admitted. Cultures were taken and she was seen by ID and placed on VAnc. She had a history of MRSA in the past. She has improved some in the hospital, but still has open draining wound and is presenting for surgical washout and possible expander removal today in the OR.     97.1  86  16  153/89  98%    A O x PERRL breathing nonlabored, patient is in no distress.   L breast red, tender and draining in 2 locations     A/P - L breast abscess around L tissue expander in previously radiated area. She has been on antibiotics since Thursday and IV since Sunday. Although she has clinically improved, the wound is open and draining still and I recommend going to the operating room to irrigate culture and washout the L breast pocket and possibly remove the  expander. The patient has consented to this in the preop holding area witnessed by the preop nursing.     Electronically signed by Radha Guevara MD at 08/10/21 4180           Veronica Haddad RN   Registered Nurse      Plan of Care   Signed   Date of Service:  08/11/21 0458   Creation Time:  08/11/21 0458            Signed             Goal Outcome Evaluation:  Plan of Care Reviewed With: patient  Progress: improving  Pt arrived back from surgery this evening. TIFFANY drain noted from the left breast site draining moderate amounts of serosanguinous fluid. Pt has received IV dilauded approximately q2 per MAR. Pt is receiving PO Norco in between for breakthrough pain. Pt tolerating regular diet well. Pt urinating and passing gas. Surgical adhesive covering breast incision.

## 2021-08-11 NOTE — PROGRESS NOTES
"Pharmacokinetic Evaluation - Vancomycin    Jina Huddleston is a 46 y.o. female on vancomycin pharmacy to dose.  MRN: [id number]  : 1975    Day of vancomycin therapy: 5  Indication: Skin and soft tissue infection  Consulted by: Dr. Holcomb/DUANE Jones - Dr. Monroy seeing for ID    Goal AUC: 400-600 mg/L*hr  *AUC will be targeted in this patient since it is a better measure of antibiotic exposure and allows for more appropriate balance of safety and efficacy. Since AUC is a total measure of drug exposure over the dosing interval, a therapeutic AUC may be reached even in patients with a trough lower than the traditional goals of 10-20 mg/L.*    Current dose: 1250 mg IV Q12  Other antimicrobials: none      Blood pressure 139/83, pulse 89, temperature 98.1 °F (36.7 °C), temperature source Oral, resp. rate 16, height 162.6 cm (64\"), weight 80.7 kg (177 lb 14.6 oz), SpO2 98 %, not currently breastfeeding.  Results from last 7 days   Lab Units 21  0722 08/10/21  0709 21  0723   CREATININE mg/dL 0.80 0.70 0.80     Estimated Creatinine Clearance: 88.4 mL/min (by C-G formula based on SCr of 0.8 mg/dL).  Results from last 7 days   Lab Units 21  0722 08/10/21  0709 21  0723   WBC 10*3/mm3 7.24 4.85 5.53   HEMOGLOBIN g/dL 12.8 13.2 13.1   HEMATOCRIT % 37.0 39.7 37.4   PLATELETS 10*3/mm3 209 171 179       Other relevant labs/chart info: 45 YO F with hx of Hodgkin's lymphoma now with breast cancer s/p bilateral mastectomy May 2020; seen by plastics with expanders placed after. Pt with initial swelling of surgical site and cx + Staph epi s/p txn with linezolid in 2020. Expander replaced in 2021; admitted with 2 wk hx of redness at the incision site, swelling, tenderness and yellow drainage.     Cultures:    Blood cx: NGTD   Wound cx: NGTD    Dosing hx (include troughs if drawn):      PK Parameters (Patient specific):  Cl = 4.46 L/hr  Vc = 63.4 L  T 1/2 = 10.8 " hr    Assessment:  Using the [employer] Bayesian software, the current regimen of 1250 q12 predicts an AUC of 561 mg/L*hr and trough = 17 mg/L. SCr appears stable and patient improving per Dr. Monroy note. S/p I&D on 8/10.    Plan:  1) Continue vancomycin 1250 mg every 12 hours.  2) Next trough on Friday 8/13 at 0930 after 5 total doses.  3) Monitor for s/sxns of vancomycin toxicity including changes in UOP/SCr; encourage hydration as tolerated to decrease risk of toxic accumulation.    Thank you for this opportunity to review this patient,    Ev Mast, Pharm.D., [facility]  Oncology Pharmacy Specialist  [phone]

## 2021-08-11 NOTE — PROGRESS NOTES
INFECTIOUS DISEASES PROGRESS NOTE    CC: f/u abscess    S:   Complain of postop pain after I and D yesterday.  No fevers or chills      O:  Physical Exam:  Temp:  [96.8 °F (36 °C)-97.9 °F (36.6 °C)] 96.8 °F (36 °C)  Heart Rate:  [] 86  Resp:  [14-16] 16  BP: (126-178)/() 133/87  Physical Exam  Pulmonary:      Effort: Pulmonary effort is normal.   Skin:     General: Skin is warm and dry.      Comments: Drain in place, L torso incision intact   Neurological:      Mental Status: She is alert.     Diagnostics:    Creatinine 0.8    AST 81  White count 7.24  Hemoglobin 12.8  Platelets 209  Wound cx: Negative  BCx NGTD  Operative cultures no growth to date  Assessment/Plan   1.  Left breast abscess  2.  Valvular heart disease, follows with Dr. Carroll and has normal ejection fraction and mild to moderate mitral valve stenosis and moderate MR  3.  Pulmonary fibrosis, likely secondary to prior radiation  4.  Mild hepatitis     Status post I&D with removal yesterday.  Operative details pending.  Seems to be doing reasonably well.  Wound culture was negative and blood cultures no growth to date.  LFTs are stable.  Follow-up operative cultures but suspect those will be negative anticipate discharge on oral antibiotics soon

## 2021-08-11 NOTE — PLAN OF CARE
Goal Outcome Evaluation:  Plan of Care Reviewed With: patient        Progress: improving  Pt arrived back from surgery this evening. TIFFANY drain noted from the left breast site draining moderate amounts of serosanguinous fluid. Pt has received IV dilauded approximately q2 per MAR. Pt is receiving PO Norco in between for breakthrough pain. Pt tolerating regular diet well. Pt urinating and passing gas. Surgical adhesive covering breast incision.

## 2021-08-11 NOTE — CASE MANAGEMENT/SOCIAL WORK
Continued Stay Note  [facility]     Patient Name: Jina Huddleston  MRN: [id number]  Today's Date: 8/11/2021    Admit Date: 8/7/2021    Discharge Plan     Row Name 08/11/21 1618       Plan    Plan  Plans dc home assist of roommates.    Patient/Family in Agreement with Plan  yes    Plan Comments  Discharge plans unchanged. Plans dc home assist of roommates. S/P I & D and removal of breast expander. Denies needing  services. No needs identified at this time. [place] to [place]. Continue to follow....[facility]        Discharge Codes    No documentation.       Expected Discharge Date and Time     Expected Discharge Date Expected Discharge Time    Aug 12, 2021             Ami Singh RN

## 2021-08-12 VITALS
OXYGEN SATURATION: 99 % | WEIGHT: 177.91 LBS | HEART RATE: 88 BPM | HEIGHT: 64 IN | SYSTOLIC BLOOD PRESSURE: 140 MMHG | RESPIRATION RATE: 16 BRPM | BODY MASS INDEX: 30.37 KG/M2 | DIASTOLIC BLOOD PRESSURE: 76 MMHG | TEMPERATURE: 97 F

## 2021-08-12 LAB
ANION GAP SERPL CALCULATED.3IONS-SCNC: 11.3 MMOL/L (ref 5–15)
BACTERIA SPEC AEROBE CULT: NORMAL
BACTERIA SPEC AEROBE CULT: NORMAL
BUN SERPL-MCNC: 13 MG/DL (ref 6–20)
BUN/CREAT SERPL: 15.7 (ref 7–25)
CALCIUM SPEC-SCNC: 9.3 MG/DL (ref 8.6–10.5)
CHLORIDE SERPL-SCNC: 105 MMOL/L (ref 98–107)
CO2 SERPL-SCNC: 24.7 MMOL/L (ref 22–29)
CREAT SERPL-MCNC: 0.83 MG/DL (ref 0.57–1)
DEPRECATED RDW RBC AUTO: 49.3 FL (ref 37–54)
ERYTHROCYTE [DISTWIDTH] IN BLOOD BY AUTOMATED COUNT: 12.8 % (ref 12.3–15.4)
GFR SERPL CREATININE-BSD FRML MDRD: 73 ML/MIN/1.73
GLUCOSE SERPL-MCNC: 93 MG/DL (ref 65–99)
HCT VFR BLD AUTO: 35.8 % (ref 34–46.6)
HGB BLD-MCNC: 12.3 G/DL (ref 12–15.9)
MCH RBC QN AUTO: 36.5 PG (ref 26.6–33)
MCHC RBC AUTO-ENTMCNC: 34.4 G/DL (ref 31.5–35.7)
MCV RBC AUTO: 106.2 FL (ref 79–97)
PLATELET # BLD AUTO: 187 10*3/MM3 (ref 140–450)
PMV BLD AUTO: 10.6 FL (ref 6–12)
POTASSIUM SERPL-SCNC: 3.9 MMOL/L (ref 3.5–5.2)
RBC # BLD AUTO: 3.37 10*6/MM3 (ref 3.77–5.28)
SODIUM SERPL-SCNC: 141 MMOL/L (ref 136–145)
WBC # BLD AUTO: 5.75 10*3/MM3 (ref 3.4–10.8)

## 2021-08-12 PROCEDURE — 85027 COMPLETE CBC AUTOMATED: CPT | Performed by: INTERNAL MEDICINE

## 2021-08-12 PROCEDURE — 94799 UNLISTED PULMONARY SVC/PX: CPT

## 2021-08-12 PROCEDURE — 25010000002 VANCOMYCIN 10 G RECONSTITUTED SOLUTION: Performed by: NURSE PRACTITIONER

## 2021-08-12 PROCEDURE — 99232 SBSQ HOSP IP/OBS MODERATE 35: CPT | Performed by: INTERNAL MEDICINE

## 2021-08-12 PROCEDURE — 80048 BASIC METABOLIC PNL TOTAL CA: CPT | Performed by: NURSE PRACTITIONER

## 2021-08-12 PROCEDURE — 25010000002 HYDROMORPHONE 1 MG/ML SOLUTION: Performed by: INTERNAL MEDICINE

## 2021-08-12 RX ORDER — SULFAMETHOXAZOLE AND TRIMETHOPRIM 800; 160 MG/1; MG/1
1 TABLET ORAL 2 TIMES DAILY
Qty: 22 TABLET | Refills: 0 | Status: SHIPPED | OUTPATIENT
Start: 2021-08-12 | End: 2021-08-23

## 2021-08-12 RX ORDER — HYDROCODONE BITARTRATE AND ACETAMINOPHEN 7.5; 325 MG/1; MG/1
1-2 TABLET ORAL EVERY 6 HOURS PRN
Qty: 24 TABLET | Refills: 0 | Status: SHIPPED | OUTPATIENT
Start: 2021-08-12 | End: 2022-01-12 | Stop reason: ALTCHOICE

## 2021-08-12 RX ORDER — FUROSEMIDE 20 MG/1
20 TABLET ORAL DAILY
Status: DISCONTINUED | OUTPATIENT
Start: 2021-08-12 | End: 2021-08-12 | Stop reason: HOSPADM

## 2021-08-12 RX ORDER — DOCOSANOL 100 MG/G
CREAM TOPICAL
Status: DISCONTINUED | OUTPATIENT
Start: 2021-08-12 | End: 2021-08-12 | Stop reason: HOSPADM

## 2021-08-12 RX ADMIN — OXYCODONE HYDROCHLORIDE AND ACETAMINOPHEN 250 MG: 500 TABLET ORAL at 08:45

## 2021-08-12 RX ADMIN — SODIUM CHLORIDE, PRESERVATIVE FREE 10 ML: 5 INJECTION INTRAVENOUS at 08:46

## 2021-08-12 RX ADMIN — HYDROMORPHONE HYDROCHLORIDE 1 MG: 1 INJECTION, SOLUTION INTRAMUSCULAR; INTRAVENOUS; SUBCUTANEOUS at 03:52

## 2021-08-12 RX ADMIN — BUDESONIDE AND FORMOTEROL FUMARATE DIHYDRATE 2 PUFF: 160; 4.5 AEROSOL RESPIRATORY (INHALATION) at 07:17

## 2021-08-12 RX ADMIN — PANTOPRAZOLE SODIUM 40 MG: 40 TABLET, DELAYED RELEASE ORAL at 06:46

## 2021-08-12 RX ADMIN — HYDROMORPHONE HYDROCHLORIDE 1 MG: 1 INJECTION, SOLUTION INTRAMUSCULAR; INTRAVENOUS; SUBCUTANEOUS at 11:50

## 2021-08-12 RX ADMIN — FAMOTIDINE 20 MG: 20 TABLET, FILM COATED ORAL at 08:45

## 2021-08-12 RX ADMIN — METOPROLOL SUCCINATE 25 MG: 25 TABLET, EXTENDED RELEASE ORAL at 08:45

## 2021-08-12 RX ADMIN — HYDROCODONE BITARTRATE AND ACETAMINOPHEN 2 TABLET: 7.5; 325 TABLET ORAL at 17:38

## 2021-08-12 RX ADMIN — HYDROMORPHONE HYDROCHLORIDE 1 MG: 1 INJECTION, SOLUTION INTRAMUSCULAR; INTRAVENOUS; SUBCUTANEOUS at 09:13

## 2021-08-12 RX ADMIN — VANCOMYCIN HYDROCHLORIDE 1250 MG: 10 INJECTION, POWDER, LYOPHILIZED, FOR SOLUTION INTRAVENOUS at 10:38

## 2021-08-12 RX ADMIN — CETIRIZINE HYDROCHLORIDE 10 MG: 10 TABLET ORAL at 08:45

## 2021-08-12 RX ADMIN — Medication 500 MG: at 08:45

## 2021-08-12 RX ADMIN — HYDROCODONE BITARTRATE AND ACETAMINOPHEN 2 TABLET: 7.5; 325 TABLET ORAL at 13:39

## 2021-08-12 RX ADMIN — ANASTROZOLE 1 MG: 1 TABLET ORAL at 08:47

## 2021-08-12 RX ADMIN — HYDROCODONE BITARTRATE AND ACETAMINOPHEN 2 TABLET: 7.5; 325 TABLET ORAL at 06:43

## 2021-08-12 NOTE — CONSULTS
[facility]   Consult Note    Patient Name: Jina Huddleston  : 1975  MRN: [id number]  Primary Care Physician:  Dalton Salinas APRN  Referring Physician: Tra Ramachandran MD PhD  Date of admission: 2021    Inpatient Plastic Surgery Consult  Consult performed by: Manjeet Wells MD  Consult ordered by: Winston Rojo MD  Reason for consult: Left breast infection with removal of soft tissue expander.  Assessment/Recommendations: 46 year old female with history of Hodgkin's lymphoma s/p radiation therapy, left breast cancer with bilateral mastectomies in the past and non autologous implant base breast reconstruction with Dr Guevara.   The patient had left expander soft tissue infection 2020 with removal and later replacement.  She had a second infection over the left breast expander requiring removal on 8/10/2021 by Dr Guevara. The patient received Vancomycin in the hospital and her current cultures from the OR are negative for growth.  The patient was seen by Dr Monroy whom may switch the patient over to Bactrim upon discharge from the hospital.  The patient has a left chest drain over the op site and dry dressing care to the chest. Dr Guevara is not available for the patient's remaining hospital care, Dr Wells was consulted to assist with the left breast op site. On today's exam the patient has intact superior and inferior breast flaps, there is an ulcer over the lower breast flap without any drainage.  There is an intact drain over the left chest with serous output.  No sign of any fluctuance over the left breast.   The right breast expander is intact without any chest tenderness.  Recommendations: 1) The patient may place Bacitracin ointment to the left chest ulcer daily with dry overlay gauze. 2) Keep the left chest TIFFANY intact and do not pull on drain. 3) Oral antibiotics per Dr Monroy 4) Follow up in the [facility] in one week with Dr Wells.            Subjective   Subjective     Reason for Consult/ Chief Complaint: Left breast infection with removal of soft tissue expander.    History of Present Illness  Jina Huddleston is a 46 y.o. female Left breast infection with removal of soft tissue expander.    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, estrogen receptor positive (CMS/HCC) 3/31/2020   • Drug therapy     For Hodgkin's Lymphoma   • GERD (gastroesophageal reflux disease)    • Heart murmur    • History of chemotherapy 1992   • Hodgkin lymphoma (CMS/HCC) 01/1992   • Hx of radiation therapy 1992    Chest wall for Hodgkin's   • Low back pain    • Marijuana use    • MRSA infection 10/2020    right little toe after a surgery  [facility]   • Neuropathy     FINGERS   • Nonrheumatic aortic valve insufficiency 1/1/1992   • Nonrheumatic mitral valve stenosis 1/1/1992       Past Surgical History:   Procedure Laterality Date   • BREAST ABSCESS INCISION AND DRAINAGE Left 7/14/2020    Procedure: INCISION AND DRAINAGE OF LEFT BREAST SEROMA,;  Surgeon: Radha Guevara MD;  Location: [facility];  Service: Plastics;  Laterality: Left;   • BREAST ABSCESS INCISION AND DRAINAGE Left 8/10/2021    Procedure: LEFT BREAST ABSCESS INCISION AND DRAINAGE, REMOVAL OF EXPANDER;  Surgeon: Radha Guevara MD;  Location: [facility];  Service: Plastics;  Laterality: Left;   • BREAST BIOPSY     • BREAST BIOPSY     • BREAST IMPLANT SURGERY Left 7/14/2020    Procedure: REMOVAL OF TISSUE EXPANDER;  Surgeon: Radha Guevara MD;  Location: [facility];  Service: Plastics;  Laterality: Left;   • BREAST RECONSTRUCTION Bilateral 5/6/2020    Procedure: BILATERAL BREAST RECONSTRUCTION WITH TISSUE EXPANDERSAND ALLODERM;  Surgeon:  Radha Guevara MD;  Location: [facility];  Service: Plastics;  Laterality: Bilateral;   • BREAST TISSUE EXPANDER INSERTION Left 1/20/2021    Procedure: DELAYED LEFT BREAST INSERTION OF TISSUE EXPANDER;  Surgeon: Radha Guevara MD;  Location: [facility] OR;  Service: Plastics;  Laterality: Left;   • COLONOSCOPY     • CYST REMOVAL      HAND   • DIAGNOSTIC LAPAROSCOPY     • ENDOSCOPY     • HYSTERECTOMY     • INSERTION OF BREAST TISSUE EXPANDER Bilateral    • KNEE SURGERY Left    • MASTECTOMY W/ SENTINEL NODE BIOPSY Bilateral 5/6/2020    Procedure: bilateral total mastectomy, bilateral sentinel node biopsy;  Surgeon: Shreya Tim MD;  Location: [facility];  Service: General;  Laterality: Bilateral;   • MULTIPLE TOOTH EXTRACTIONS     • TOE SURGERY Right    • TUBAL ABDOMINAL LIGATION     • VENOUS ACCESS DEVICE (PORT) INSERTION AND REMOVAL         Family History: family history includes Cancer in her maternal uncle; Prostate cancer in her maternal uncle. Otherwise pertinent FHx was reviewed and not pertinent to current issue.    Social History:  reports that she quit smoking about 9 months ago. Her smoking use included cigarettes. She started smoking about 35 years ago. She has a 16.50 pack-year smoking history. She has never used smokeless tobacco. She reports previous alcohol use. She reports current drug use. Frequency: 1.00 time per week. Drug: Marijuana.    Home Medications:   Calcium Citrate-Vitamin D, Vitamin C, Zinc, albuterol, anastrozole, budesonide-formoterol, cetirizine, desvenlafaxine, diazePAM, docosanol, famotidine, fluticasone, furosemide, meloxicam, metoprolol succinate XL, and pantoprazole    Allergies:  No Known Allergies    Objective    Objective     Vitals:  Temp:  [96.9 °F (36.1 °C)-98.1 °F (36.7 °C)] 97 °F (36.1 °C)  Heart Rate:  [] 88  Resp:  [16] 16  BP: (123-147)/(74-87) 140/76  Flow (L/min):  [2] 2    Physical Exam  Vitals reviewed.   Constitutional:        Appearance: Normal appearance.   HENT:      Mouth/Throat:      Mouth: Mucous membranes are moist.   Eyes:      Pupils: Pupils are equal, round, and reactive to light.   Cardiovascular:      Rate and Rhythm: Normal rate and regular rhythm.   Pulmonary:      Effort: Pulmonary effort is normal.   Abdominal:      General: Abdomen is flat.      Palpations: Abdomen is soft.   Skin:     General: Skin is warm and dry.      Comments: Left chest:   There are intact superior and inferior breast flaps, there is an ulcer over the lower breast flap without any drainage.  There is an intact drain over the left chest with serous output.  No sign of any fluctuance over the left breast.      The right breast expander is intact without any chest tenderness or erythema.    Neurological:      General: No focal deficit present.      Mental Status: She is alert.   Psychiatric:         Mood and Affect: Mood normal.         Thought Content: Thought content normal.         Result Review    Result Review:  I have personally reviewed the results from the time of this admission to 8/12/2021 15:31 EDT and agree with these findings:  [x]  Laboratory  [x]  Microbiology  []  Radiology  []  EKG/Telemetry   []  Cardiology/Vascular   []  Pathology  [x]  Old records  []  Other:  Most notable findings include:     Assessment/Plan   Assessment / Plan     Brief Patient Summary:  Jina Huddleston is a 46 y.o. female whom has a left chest drain s/p removal of a soft tissue expander.     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   The patient had left expander soft tissue infection 7/14/2020 with removal and later replacement.  She had a second infection over the left breast expander requiring removal on 8/10/2021 by Dr Guevara. The patient received Vancomycin in the hospital and her current cultures from the OR are negative for growth.  The patient was seen by Dr Monroy whom may switch the patient over to Bactrim upon discharge from the hospital.  The patient has a left chest drain over the op site and dry dressing care to the chest. On today's exam the patient has intact superior and inferior breast flaps, there is an ulcer over the lower breast flap without any drainage.  There is an intact drain over the left chest with serous output.  No sign of any fluctuance over the left breast.   The right breast expander is intact without any chest tenderness.    Please see above recommendations.      Electronically signed by Manjeet Wells MD, 08/12/21, 3:31 PM EDT.

## 2021-08-12 NOTE — DISCHARGE SUMMARY
Patient Name: Jina Huddleston  : 1975  MRN: [id number]    Date of Admission: 2021  Date of Discharge:  2021  Primary Care Physician: Dalton Salinas APRN      Chief Complaint:   Wound Check      Discharge Diagnoses     Active Hospital Problems    Diagnosis  POA   • **Left breast abscess [N61.1]  Yes   • Abnormal thyroid function test [R94.6]  Yes   • Macrocytosis [D75.89]  Yes   • Chronic diastolic heart failure (CMS/HCC) [I50.32]  Yes   • History of MRSA infection [Z86.14]  Yes   • Pulmonary fibrosis (CMS/HCC) [J84.10]  Yes   • Transaminitis [R74.01]  Yes   • Valvular heart disease [I38]  Yes   • Murmur, heart [R01.1]  Yes   • GERD without esophagitis [K21.9]  Yes   • 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        Hospital Course     Ms. Huddleston is a 46 y.o. female  with history of Hodgkin's lymphoma s/p radiation therapy, left breast cancer with bilateral mastectomies in the past and non autologous implant base breast reconstruction with Dr Guevara.   The patient had left expander soft tissue infection 2020 with removal and later replacement.  She had a second infection over the left breast expander requiring removal on 8/10/2021 by Dr Guevara. The patient received Vancomycin in the hospital and her current cultures from the OR are negative for growth.  The patient was seen by Dr Monroy who recommends Bactrim DS upon discharge from the hospital with a stop date of 21. The patient has a left chest drain over the op site and dry dressing care to the chest. Dr Guevara is not available for the patient's remaining hospital care, Dr Wells was consulted to assist with the  left breast op site. There is an intact drain over the left chest with serous output.  Recommendations: 1) The patient may place Bacitracin ointment to the left chest ulcer daily with dry overlay gauze. 2) Keep the left chest TIFFANY intact and do not pull on drain. 3) Oral antibiotics per Dr Monroy, operative cultures are NGTD 4) Follow up in the [facility] in one week with Dr Wells. She had a mildly elevated TSH but normal T3 and T4 needs follow-up as an outpatient. She was noted to have transaminitis with negative  hepatitis panel and right upper quadrant ultrasound negative indicating elevated LFTs most likely secondary to medication. She had acute renal failure because of prerenal azotemia and dehydration that resolved with IVFs.  She is stable for discharge home today is to follow-up with her PCP in 1 to 2 weeks and keep scheduled appointment with specialists.    Day of Discharge     Subjective:  no new complaints or events overnight. she is more than ready to go home.    denies chest painm, palpitations, SOA, edema, fever, chills, nausea, vomiting and diarrhea. still with expected postoperative pain but controlled with medication.     Physical Exam:  Temp:  [96.9 °F (36.1 °C)-97.6 °F (36.4 °C)] 97 °F (36.1 °C)  Heart Rate:  [] 88  Resp:  [16] 16  BP: (123-147)/(74-87) 140/76  Body mass index is 30.54 kg/m².  Physical Exam  Vitals and nursing note reviewed.   Constitutional:       Appearance: She is obese. She is ill-appearing.   HENT:      Head: Normocephalic and atraumatic.   Eyes:      Extraocular Movements: Extraocular movements intact.      Conjunctiva/sclera: Conjunctivae normal.   Cardiovascular:      Rate and Rhythm: Normal rate and regular rhythm.   Pulmonary:      Effort: No respiratory distress.      Breath sounds: Normal breath sounds.   Abdominal:      General: Bowel sounds are normal. There is no distension.      Palpations: Abdomen is soft.      Tenderness: There is  no abdominal tenderness.   Musculoskeletal:         General: No swelling. Normal range of motion.      Cervical back: Normal range of motion and neck supple.   Skin:     General: Skin is warm and dry.      Comments: Left chest drain with dry dressing to chest with serous drainage   Neurological:      Mental Status: She is alert and oriented to person, place, and time. Mental status is at baseline.   Psychiatric:         Mood and Affect: Mood normal.         Behavior: Behavior normal.         Consultants     Consult Orders (all) (From admission, onward)     Start     Ordered    08/11/21 0959  Inpatient Plastic Surgery Consult  Once     Specialty:  Plastic Surgery  Provider:  FILIPE Gauthier MD    08/11/21 1001    08/07/21 2321  Inpatient Infectious Diseases Consult  Once     Specialty:  Infectious Diseases  Provider:  Ev Omer MD    08/07/21 2320    08/07/21 2310  Inpatient Plastic Surgery Consult  Once     Specialty:  Plastic Surgery  Provider:  Radha Guevara MD    08/07/21 2311    08/07/21 2233  [facility] (on-call MD unless specified) Details  Once     Specialty:  Hospitalist  Provider:  (Not yet assigned)    08/07/21 2232              Procedures     LEFT BREAST ABSCESS INCISION AND DRAINAGE, REMOVAL OF EXPANDER      Imaging Results (All)     Procedure Component Value Units Date/Time    US Abdomen Limited [[id number]] Collected: 08/08/21 1935     Updated: 08/09/21 1128    Narrative:      LIMITED ULTRASOUND EXAMINATION OF THE ABDOMEN     CLINICAL HISTORY: Elevated liver function tests.     FINDINGS:       Ultrasound examination of the right upper quadrant demonstrates a liver  parenchyma of homogeneous echotexture. There is a simple cyst within the  right lobe of the liver measuring up to 5.2 x 3.9 x 5.1 cm. There is no  evidence for intra or extrahepatic ductal dilatation. The common duct  measures 3 mm in diameter which is within normal limits. There is no  evidence for cholelithiasis or cholecystitis. The  pancreas is not well  visualized but appears unremarkable within its visualized portions. The  right kidney measures up to 11.1 cm in long axis and has normal  corticomedullary differentiation. There is no evidence for  hydronephrosis.       Impression:         A 5.2 cm simple cyst within the right lobe of the liver.     No evidence for ductal dilatation, cholelithiasis, or cholecystitis.     This report was finalized on 8/9/2021 11:25 AM by Dr. Alon Barron M.D.             Results for orders placed during the hospital encounter of 07/27/21    Adult Transthoracic Echo Complete W/ Cont if Necessary Per Protocol    Interpretation Summary  · Estimated left ventricular EF = 60% Left ventricular systolic function is normal.  · There is mild, bileaflet mitral valve thickening present.  · Moderate mitral valve regurgitation is present.  · Mild to moderate mitral valve stenosis is present.  · Mild to moderate aortic valve regurgitation is present.  · Abnormal global longitudinal LV strain (GLS) = -10.8%.    Pertinent Labs     Results from last 7 days   Lab Units 08/12/21  0611 08/11/21  0722 08/10/21  0709 08/09/21  0723   WBC 10*3/mm3 5.75 7.24 4.85 5.53   HEMOGLOBIN g/dL 12.3 12.8 13.2 13.1   PLATELETS 10*3/mm3 187 209 171 179     Results from last 7 days   Lab Units 08/12/21  0611 08/11/21  0722 08/10/21  0709 08/09/21  0723   SODIUM mmol/L 141 138 139 139   POTASSIUM mmol/L 3.9 4.3 4.0 4.8   CHLORIDE mmol/L 105 101 103 102   CO2 mmol/L 24.7 25.3 25.8 29.0   BUN mg/dL 13 9 10 12   CREATININE mg/dL 0.83 0.80 0.70 0.80   GLUCOSE mg/dL 93 112* 84 100*   Estimated Creatinine Clearance: 85.2 mL/min (by C-G formula based on SCr of 0.83 mg/dL).  Results from last 7 days   Lab Units 08/11/21  0722 08/10/21  0709 08/09/21  0723 08/08/21  0949   ALBUMIN g/dL 4.00 3.90 4.10 4.50   BILIRUBIN mg/dL 0.8 0.8 0.8 0.7   ALK PHOS U/L 113 110 105 134*   AST (SGOT) U/L 81* 74* 75* 87*   ALT (SGPT) U/L 156* 142* 140* 152*     Results from  last 7 days   Lab Units 08/12/21  0611 08/11/21  0722 08/10/21  0709 08/09/21  0723 08/08/21  0949 08/08/21  0949   CALCIUM mg/dL 9.3 9.3 9.1 9.2   < > 8.9   ALBUMIN g/dL  --  4.00 3.90 4.10  --  4.50    < > = values in this interval not displayed.               Invalid input(s): LDLCALC  Results from last 7 days   Lab Units 08/10/21  1909 08/10/21  1907 08/07/21  2207 08/07/21  2104   BLOODCX   --   --   --  No growth at 4 days  No growth at 4 days   WOUNDCX  No growth at 2 days No growth at 2 days No growth at 3 days  --      Results from last 7 days   Lab Units 08/10/21  0815   COVID19  Not Detected       Test Results Pending at Discharge     Pending Labs     Order Current Status    Anaerobic Culture - Tissue, Breast, Left In process    Anaerobic Culture - Wound, Breast, Left In process    Anaerobic Culture - Wound, Breast, Left In process    Tissue Pathology Exam In process    Blood Culture - Blood, Arm, Left Preliminary result    Blood Culture - Blood, Arm, Left Preliminary result    Tissue / Bone Culture - Tissue, Breast, Left Preliminary result    Wound Culture - Wound, Breast, Left Preliminary result    Wound Culture - Wound, Breast, Left Preliminary result          Discharge Details        Discharge Medications      New Medications      Instructions Start Date   HYDROcodone-acetaminophen 7.5-325 MG per tablet  Commonly known as: NORCO   1-2 tablets, Oral, Every 6 Hours PRN      sulfamethoxazole-trimethoprim 800-160 MG per tablet  Commonly known as: Bactrim DS   160 mg, Oral, 2 Times Daily         Continue These Medications      Instructions Start Date   albuterol 108 (90 Base) MCG/ACT inhaler  Commonly known as: PROAIR RESPICLICK   2 puffs, Inhalation, Every 4 Hours PRN      albuterol 1.25 MG/3ML nebulizer solution  Commonly known as: ACCUNEB   1 ampule, Nebulization, Every 6 Hours PRN      anastrozole 1 MG tablet  Commonly known as: ARIMIDEX   1 mg, Oral, Daily      CALCIUM + D PO   1 tablet, Oral, Every  Morning      cetirizine 10 MG tablet  Commonly known as: zyrTEC   10 mg, Oral, Daily, as directed      desvenlafaxine 50 MG 24 hr tablet  Commonly known as: PRISTIQ   50 mg, Oral, Nightly      diazePAM 2 MG tablet  Commonly known as: VALIUM   5 mg, Oral, 2 Times Daily PRN      docosanol 10 % cream cream  Commonly known as: ABREVA   1 application, Topical, As Needed      fluticasone 50 MCG/ACT nasal spray  Commonly known as: FLONASE   2 sprays, Nasal, Daily      furosemide 20 MG tablet  Commonly known as: LASIX   20 mg, Oral, Daily      meloxicam 15 MG tablet  Commonly known as: MOBIC   15 mg, Oral, Daily      metoprolol succinate XL 25 MG 24 hr tablet  Commonly known as: Toprol XL   25 mg, Oral, 2 times daily      pantoprazole 20 MG EC tablet  Commonly known as: PROTONIX   20 mg, Oral, 2 times daily      Symbicort 160-4.5 MCG/ACT inhaler  Generic drug: budesonide-formoterol   2 puffs, Inhalation, 2 Times Daily      Vitamin C 100 MG chewable tablet   500 mg, Oral, Daily      Zinc 30 MG capsule   30 mg, Oral, Daily         Stop These Medications    famotidine 20 MG tablet  Commonly known as: PEPCID            No Known Allergies    Discharge Disposition:  Home or Self Care      Discharge Diet:  Diet Order   Procedures   • Diet Regular; Cardiac       Discharge Activity:   Activity Instructions     Activity as Tolerated            CODE STATUS:    Code Status and Medical Interventions:   Ordered at: 08/07/21 2329     Code Status:    CPR     Medical Interventions (Level of Support Prior to Arrest):    Full       Future Appointments   Date Time Provider Department Center   8/24/2021  2:15 PM Manjeet Wells MD [facility]   9/8/2021 12:00 PM Shreya Tim MD [facility]     Additional Instructions for the Follow-ups that You Need to Schedule     Discharge Follow-up with PCP   As directed       Currently Documented PCP:    Dalton Salinas APRN    PCP Phone Number:    [phone]     Follow Up  Details: 1-2 weeks         Discharge Follow-up with Specified Provider: plastics; 1 Week   As directed      To: plastics    Follow Up: 1 Week           Follow-up Information     Dalton Salinas APRN .    Specialty: Family Medicine  Why: 1-2 weeks  Contact information:  [facility] [address]  [phone]                   Additional Instructions for the Follow-ups that You Need to Schedule     Discharge Follow-up with PCP   As directed       Currently Documented PCP:    Dalton Salinas APRN    PCP Phone Number:    [phone]     Follow Up Details: 1-2 weeks         Discharge Follow-up with Specified Provider: plastics; 1 Week   As directed      To: plastics    Follow Up: 1 Week           Time Spent on Discharge:  Greater than 30 minutes      DUANE Davis  [facility]  08/12/21  17:23 EDT

## 2021-08-12 NOTE — PROGRESS NOTES
"[facility]  Clinical Pharmacy Department     Vancomycin Pharmacokinetic Note    Jina Huddleston is a 46 y.o. female who is on day 6 of pharmacy to dose vancomycin for complicated skin and soft tissue infection.    Target level: AUC24 400-600  Consulting Provider:  Dr. Holcomb/DUANE Jones and Dr. Monroy following  Current Vancomycin Dose:   1250 mg  IV every  12  hours  Planned Duration of Therapy: TBD  Other Antimicrobials: none    Allergies  Allergies as of 08/07/2021   • (No Known Allergies)       Microbiology:  8/7 Blood cx: NGTD  8/7 Wound cx: NGTD  8/10 wcx: NGTD      Vitals/Labs/I&O  162.6 cm (64\")  80.7 kg (177 lb 14.6 oz)  Body mass index is 30.54 kg/m².   Temp:  [96.9 °F (36.1 °C)-98.1 °F (36.7 °C)] 96.9 °F (36.1 °C)  Heart Rate:  [] 93  Resp:  [16] 16  BP: (123-139)/(74-83) 123/74    Results from last 7 days   Lab Units 08/12/21  0611 08/11/21  0722 08/10/21  0709   WBC 10*3/mm3 5.75 7.24 4.85     Results from last 7 days   Lab Units 08/08/21  0948 08/07/21  2104   LACTATE mmol/L 1.5 1.4      Results from last 7 days   Lab Units 08/07/21  2104   PROCALCITONIN ng/mL 0.04     Results from last 7 days   Lab Units 08/07/21  2104   CRP mg/dL <0.30              Estimated Creatinine Clearance: 85.2 mL/min (by C-G formula based on SCr of 0.83 mg/dL).  Results from last 7 days   Lab Units 08/12/21  0611 08/11/21  0722 08/10/21  0709   BUN mg/dL 13 9 10   CREATININE mg/dL 0.83 0.80 0.70     Intake & Output (last 3 days)       08/09 0701 - 08/10 0700 08/10 0701 - 08/11 0700 08/11 0701 - 08/12 0700 08/12 0701 - 08/13 0700    P.O.   1020     I.V. (mL/kg)  400 (5)      IV Piggyback 500 250 250     Total Intake(mL/kg) 500 (6.2) 650 (8.1) 1270 (15.7)     Urine (mL/kg/hr) 1900 (1) 1900 (1) 2200 (1.1)     Drains  20 40     Total Output 1900 1920 2240     Net [phone]                 Other relevant labs/chart info: 45 YO F with hx of Hodgkin's lymphoma now with breast cancer s/p bilateral " "mastectomy May 2020; seen by plastics with expanders placed after. Pt with initial swelling of surgical site and cx + Staph epi s/p txn with linezolid in July 2020. Expander replaced in Jan 2021; admitted with 2 wk hx of redness at the incision site, swelling, tenderness and yellow drainage.        Vancomycin Dosing and Level History:      Assessment:  Bayesian analysis of the most recent level(s) using PlexPressRCelebCalls provides the following patient-specific pharmacokinetic parameters:   CL: 4.33 L/h   Vd: 63.4 L   T1/2: 11.1 h  If the predicted trough on this regimen is not within what was previously considered a \"target trough range\", the AUC24 (a stronger predictor of vancomycin efficacy) is predicted to achieve the accepted target of 400-600 mg*h/L. To best balance efficacy and toxicity, we will be targeting AUC24 in this patient rather than steady-state trough levels.     Continuing the regimen of 1250 mg (15.5 mg/kg) IV every 12 hours gives a predicted steady-state trough of 17.6 mg/L and AUC24 of 576 mg*h/L.    Recommendations/Plan:  - Continue current vancomycin regimen of 1250 mg (15.5 mg/kg) IV every 12 hours  - Obtain vancomycin level on 8/13 @ 0930 or sooner if acute changes  - Continue to monitor SCr      Thank you for involving pharmacy in this patient's care. Please contact pharmacy with any questions or concerns.                           Álvaro Colvin [facility]  Clinical Pharmacist  08/12/21 08:59 EDT    "

## 2021-08-12 NOTE — PROGRESS NOTES
INFECTIOUS DISEASES PROGRESS NOTE    CC: f/u abscess    S:   Complain of postop pain after I and D yesterday.  No fevers or chills      O:  Physical Exam:  Temp:  [96.9 °F (36.1 °C)-98.1 °F (36.7 °C)] 96.9 °F (36.1 °C)  Heart Rate:  [] 93  Resp:  [16] 16  BP: (123-139)/(74-83) 123/74  Physical Exam  Pulmonary:      Effort: Pulmonary effort is normal.   Skin:     General: Skin is warm and dry.      Comments: Drain in place, L torso incision intact   Neurological:      Mental Status: She is alert.     Diagnostics:    Cr 0.83  WBC 5.75  Platelets 187    Wound cx: Negative  BCx NGTD  Operative cultures no growth to date    Assessment/Plan   1.  Left breast abscess  2.  Valvular heart disease, follows with Dr. Carroll and has normal ejection fraction and mild to moderate mitral valve stenosis and moderate MR  3.  Pulmonary fibrosis, likely secondary to prior radiation  4.  Mild hepatitis     Operative cx are negative to date  Feels better. Pain decreased, sleeping better. No f/c/ns  Cont on vanc while inpatient but can discharge on bactrim ds po bid thru 8/23 (two week course after surgery)

## 2021-08-12 NOTE — PROGRESS NOTES
Name: Jina Huddleston ADMIT: 2021   : 1975  PCP: Dalton Salinas APRN    MRN: [id number] LOS: 5 days   AGE/SEX: 46 y.o. female  ROOM: [facility]     Subjective   Subjective   feels much better today and has been able to rest now that pain is more controlled. no events overnight.     Review of Systems  denies chest pain, palpitations, SOA, edema, fever, chills, nausea, vomiting and diarrhea.      Objective   Objective   Vital Signs  Temp:  [96.9 °F (36.1 °C)-98.1 °F (36.7 °C)] 97 °F (36.1 °C)  Heart Rate:  [] 88  Resp:  [16] 16  BP: (123-147)/(74-87) 140/76  SpO2:  [92 %-99 %] 99 %  on  Flow (L/min):  [2] 2;   Device (Oxygen Therapy): room air    Intake/Output Summary (Last 24 hours) at 2021 1512  Last data filed at 2021 1300  Gross per 24 hour   Intake 1050 ml   Output 2240 ml   Net -1190 ml     Body mass index is 30.54 kg/m².      21  0040   Weight: 80.7 kg (177 lb 14.6 oz)     Physical Exam  General.  Middle-aged female.  Alert and oriented x3.  In no pain or distress   Eyes.  Pupils equal round and reactive.  Intact extraocular musculature.  No pallor or jaundice.  Normal conjunctivae and lids.  Oral cavity.  Moist mucous membrane.  Neck.  Supple.  No JVD.  No lymphadenopathy or thyromegaly.  Cardiovascular.  Regular rate and rhythm.  cardiac murmur.  Chest.  Clear bilateral air entry with no added sounds.  Breasts.  Scars both breasts.  Expander in right. s/p left breast I&D with expander removal. TIFFANY drain in place with minimal sanguineous output.   Abdomen.  Soft lax.  No tenderness.  No organomegaly.  No guarding or rebound.  Extremities.  No clubbing cyanosis or edema.    Results Review:      Results from last 7 days   Lab Units 21  0611 21  0722 08/10/21  0709 21  0723 21  0949 21  8294   SODIUM mmol/L 141 138 139 139 140 138   POTASSIUM mmol/L 3.9 4.3 4.0 4.8 3.9 4.1   CHLORIDE mmol/L 105 101 103 102 105 100   CO2 mmol/L 24.7 25.3 25.8  29.0 23.4 25.1   BUN mg/dL 13 9 10 12 15 15   CREATININE mg/dL 0.83 0.80 0.70 0.80 0.93 1.10*   GLUCOSE mg/dL 93 112* 84 100* 91 83   CALCIUM mg/dL 9.3 9.3 9.1 9.2 8.9 9.9   AST (SGOT) U/L  --  81* 74* 75* 87* 94*   ALT (SGPT) U/L  --  156* 142* 140* 152* 162*     Estimated Creatinine Clearance: 85.2 mL/min (by C-G formula based on SCr of 0.83 mg/dL).                  Results from last 7 days   Lab Units 08/08/21  0949   TSH uIU/mL 7.240*           Invalid input(s):  PHOS        Invalid input(s): LDLCALC  Results from last 7 days   Lab Units 08/12/21  0611 08/11/21  0722 08/11/21  0722 08/10/21  0709 08/10/21  0709 08/09/21  0723 08/09/21  0723 08/08/21  0948 08/08/21  0948 08/07/21  2104 08/07/21  2104   WBC 10*3/mm3 5.75  --  7.24  --  4.85  --  5.53  --  5.64  --  8.03   HEMOGLOBIN g/dL 12.3  --  12.8  --  13.2  --  13.1  --  14.6  --  15.7   HEMATOCRIT % 35.8  --  37.0  --  39.7  --  37.4  --  43.0  --  44.4   PLATELETS 10*3/mm3 187  --  209  --  171  --  179  --  200  --  219   MCV fL 106.2*   < > 107.6*   < > 109.7*   < > 106.3*   < > 107.2*   < > 107.5*   MCH pg 36.5*   < > 37.2*   < > 36.5*   < > 37.2*   < > 36.4*   < > 38.0*   MCHC g/dL 34.4   < > 34.6   < > 33.2   < > 35.0   < > 34.0   < > 35.4   RDW % 12.8   < > 13.1   < > 13.0   < > 13.0   < > 13.0   < > 13.4   RDW-SD fl 49.3   < > 51.8   < > 53.2   < > 50.7   < > 51.0   < > 53.4   MPV fL 10.6   < > 10.2   < > 10.2   < > 10.2   < > 10.3   < > 10.7   NEUTROPHIL % %  --   --   --   --   --   --  67.2   < > 57.5   < > 60.4   LYMPHOCYTE % %  --   --   --   --   --   --  20.8   < > 30.1   < > 28.5   MONOCYTES % %  --   --   --   --   --   --  6.0   < > 6.7   < > 7.1   EOSINOPHIL % %  --   --   --   --   --   --  4.5   < > 4.1   < > 2.9   BASOPHIL % %  --   --   --   --   --   --  1.1   < > 1.2   < > 0.9   IMM GRAN % %  --   --   --   --   --   --   --   --  0.4   < > 0.2   NEUTROS ABS 10*3/mm3  --   --   --   --   --   --  3.72   < > 3.24   < > 4.85   LYMPHS  ABS 10*3/mm3  --   --   --   --   --   --  1.15   < > 1.70   < > 2.29   MONOS ABS 10*3/mm3  --   --   --   --   --   --  0.33   < > 0.38   < > 0.57   EOS ABS 10*3/mm3  --   --   --   --   --   --  0.25   < > 0.23   < > 0.23   BASOS ABS 10*3/mm3  --   --   --   --   --   --  0.06   < > 0.07   < > 0.07   IMMATURE GRANS (ABS) 10*3/mm3  --   --   --   --   --   --   --   --  0.02   < > 0.02   NRBC /100 WBC  --   --   --   --   --   --   --   --  0.0   < > 0.0    < > = values in this interval not displayed.     Results from last 7 days   Lab Units 08/08/21  0949   INR  0.95         Results from last 7 days   Lab Units 08/08/21  0948 08/07/21  2104   PROCALCITONIN ng/mL  --  0.04   LACTATE mmol/L 1.5 1.4     Results from last 7 days   Lab Units 08/07/21  2104   CRP mg/dL <0.30         Results from last 7 days   Lab Units 08/10/21  1909 08/10/21  1907 08/07/21  2207 08/07/21  2104   BLOODCX   --   --   --  No growth at 4 days  No growth at 4 days   WOUNDCX  No growth at 2 days No growth at 2 days No growth at 3 days  --                    Imaging:  Imaging Results (Last 24 Hours)     ** No results found for the last 24 hours. 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           · Left breast abscess in a patient with a history of left breast cancer s/p bilateral mastectomy and left breast expander insertion.  Patient with a previous left breast expander infection with MRSA infection status post removal.  Lactic acid and pro calcitonin are normal.  No clinical evidence of sepsis with no fever and normal white count.  Wound cultures and blood cultures are negative thus far.  ID following and tailoring abx.  Currently on IV vancomycin and will DC on Bactrim.  s/p I&D of left breast with expander removal with TIFFANY drain in place 8/10/21. with uncontrolled post operative pain, increase frequency of norco to every 4 hours.   · History of Hodgkin's lymphoma/macrocytosis.  Normal CBC except macrocytosis.  Normal  B12/folate/cortisol.  Elevated TSH (look below)   · Elevated TSH.  Normal free T4 and total and free T4.  This is indicative of sick thyroid syndrome.  Needs follow-up as an outpatient.  · History of chronic diastolic congestive heart failure/MS/MR. Does not appear to have angina or congestive heart failure at this time.  Resolved dyspnea   Appears euvolemic.  Good blood pressure control.  Continue Toprol.  · Acute kidney failure.  resolved. resume lasix.  · VTE prophylaxis.  Sequential compression devices.      Discussed discussed my findings and plan of treatment with the patient.  Disposition. likely home tomorrow after assessed by new plastic surgeon.    DUANE Lennon  [facility]  08/12/21  15:12 EDT

## 2021-08-12 NOTE — PROGRESS NOTES
POD #1 L expander removal from dehiscence/exposure  Patient reported pain overnight.    She refused to discuss her condition any further with me and stated she was firing me and choosing to see another plastic surgeon. She was unhappy with feeling rushed to surgery even though she had over 24 hours between recommendation for surgery (Monday morning) and the operation and we fully discussed in the preop holding area with the nurse as witness (tueday evening). With an exposed expander in 2 places, draining , painful wounds in a radiated field, there really were no other options other than to remove the foreign object.     I asked her to please send our office a release to forward records to her plastic surgeon of choice.

## 2021-08-13 ENCOUNTER — READMISSION MANAGEMENT (OUTPATIENT)
Dept: CALL CENTER | Facility: HOSPITAL | Age: 46
End: 2021-08-13

## 2021-08-13 LAB
BACTERIA SPEC AEROBE CULT: NORMAL
CYTO UR: NORMAL
GRAM STN SPEC: NORMAL
LAB AP CASE REPORT: NORMAL
PATH REPORT.FINAL DX SPEC: NORMAL
PATH REPORT.GROSS SPEC: NORMAL

## 2021-08-13 NOTE — OUTREACH NOTE
Prep Survey      Responses   [facility] facility patient discharged from?  [facility]   Is LACE score < 7 ?  No   Emergency Room discharge w/ pulse ox?  No   Eligibility  Readm Mgmt   Discharge diagnosis  Left breast abscess    Does the patient have one of the following disease processes/diagnoses(primary or secondary)?  General Surgery   Does the patient have Home health ordered?  No   Is there a DME ordered?  No   Prep survey completed?  Yes          Maggie Perez RN

## 2021-08-13 NOTE — PAYOR COMM NOTE
"DISCHARGED  REF #[id number]        Lew Shell (46 y.o. Female)     Date of Birth Social Security Number Address Home Phone MRN    1975  [address] [place], [place] [address] [phone] [id number]    Restorationist Marital Status          Sabianism        Admission Date Admission Type Admitting Provider Attending Provider Department, Room/Bed    8/7/21 Emergency Mallory Epps MD  [address] P388/1    Discharge Date Discharge Disposition Discharge Destination        8/12/2021 Home or Self Care              Attending Provider: (none)   Allergies: No Known Allergies    Isolation: None   Infection: None   Code Status: Prior    Ht: 162.6 cm (64\")   Wt: 80.7 kg (177 lb 14.6 oz)    Admission Cmt: None   Principal Problem: Left breast abscess [N61.1]                 Active Insurance as of 8/7/2021     Primary Coverage     Payor Plan Insurance Group Employer/Plan Group    [facility] BY AKIRA [id number]     Payor Plan Address Payor Plan Phone Number Payor Plan Fax Number Effective Dates    [address]   1/1/2021 - None Entered    [place] [address]       Subscriber Name Subscriber Birth Date Member ID       LEW SHELL 1975 [id number]                 Emergency Contacts      (Rel.) Home Phone Work Phone Mobile Phone    ANNELISE HAMILTON (Daughter) [phone] -- --    Diane, Will (Significant Other) -- -- [phone]            Discharge Order (From admission, onward)     Start     Ordered    08/12/21 1718  Discharge patient  Once     Expected Discharge Date: 08/12/21    Discharge Disposition: Home or Self Care    Physician of Record for Attribution - Please select from Treatment Team: MALLORY EPPS [[id number]]    Review needed by CMO to determine Physician of Record: No       Question Answer Comment   Physician of Record for Attribution - Please select from Treatment Team MALLORY EPPS    Review needed by CMO to " determine Physician of Record No        08/12/21 [id number]

## 2021-08-13 NOTE — CASE MANAGEMENT/SOCIAL WORK
Case Management Discharge Note      Final Note: Home---no needs    Provided Post Acute Provider List?: Yes  Post Acute Provider List: Home Health  Delivered To: Patient  Method of Delivery: In person    Selected Continued Care - Discharged on 8/12/2021 Admission date: 8/7/2021 - Discharge disposition: Home or Self Care    Destination    No services have been selected for the patient.              Durable Medical Equipment    No services have been selected for the patient.              Dialysis/Infusion    No services have been selected for the patient.              Home Medical Care    No services have been selected for the patient.              Therapy    No services have been selected for the patient.              Community Resources    No services have been selected for the patient.              Community & DME    No services have been selected for the patient.                       Final Discharge Disposition Code: 01 - home or self-care

## 2021-08-15 LAB
BACTERIA SPEC ANAEROBE CULT: NORMAL

## 2021-08-17 ENCOUNTER — DOCUMENTATION (OUTPATIENT)
Dept: SURGERY | Facility: HOSPITAL | Age: 46
End: 2021-08-17

## 2021-08-17 ENCOUNTER — TELEPHONE (OUTPATIENT)
Dept: SURGERY | Facility: HOSPITAL | Age: 46
End: 2021-08-17

## 2021-08-17 ENCOUNTER — READMISSION MANAGEMENT (OUTPATIENT)
Dept: CALL CENTER | Facility: HOSPITAL | Age: 46
End: 2021-08-17

## 2021-08-17 ENCOUNTER — NURSE TRIAGE (OUTPATIENT)
Dept: CALL CENTER | Facility: HOSPITAL | Age: 46
End: 2021-08-17

## 2021-08-17 DIAGNOSIS — T81.30XA: Primary | ICD-10-CM

## 2021-08-17 RX ORDER — HYDROCODONE BITARTRATE AND ACETAMINOPHEN 5; 325 MG/1; MG/1
2 TABLET ORAL EVERY 6 HOURS PRN
Qty: 30 TABLET | Refills: 0 | Status: SHIPPED | OUTPATIENT
Start: 2021-08-17 | End: 2022-01-12 | Stop reason: ALTCHOICE

## 2021-08-17 NOTE — TELEPHONE ENCOUNTER
The patient called the wound care clinic and said she was still having post op pain from her procedure.  The patient had a breast procedure on 8/10/2021 by Dr Guevara and now is no longer seeing her physician.  She has no fevers, she has left sided drain.  She may given Heike tab 5/325 mg, one table every 6 hours.  #30.  This was sent to her pharmacy .

## 2021-08-17 NOTE — TELEPHONE ENCOUNTER
"    Reason for Disposition  • [1] Post-op pain AND [2] not controlled with pain medications    Additional Information  • Negative: [1] Major abdominal surgical incision AND [2] wound gaping open AND [3] visible internal organs  • Negative: Sounds like a life-threatening emergency to the triager  • Negative: Patient has a Negative Pressure Wound Therapy device  • Negative: Patient is followed by a wound clinic or wound specialist for this wound  • Negative: [1] Bleeding from incision AND [2] won't stop after 10 minutes of direct pressure  • Negative: [1] Widespread rash AND [2] bright red, sunburn-like  • Negative: Severe pain in the incision  • Negative: [1] Incision gaping open AND [2] < 48 hours since wound re-opened  • Negative: [1] Incision gaping open AND [2] length of opening > 2 inches (5 cm)  • Negative: Patient sounds very sick or weak to the triager  • Negative: Sounds like a serious complication to the triager  • Negative: Fever > 100.4 F (38.0 C)  • Negative: [1] Incision looks infected (spreading redness, pain) AND [2] fever > 99.5 F (37.5 C)  • Negative: [1] Incision looks infected (spreading redness, pain) AND [2] large red area (> 2 in. or 5 cm)  • Negative: [1] Incision looks infected (spreading redness, pain) AND [2] face wound  • Negative: [1] Red streak runs from the incision AND [2] longer than 1 inch (2.5 cm)  • Negative: [1] Pus or bad-smelling fluid draining from incision AND [2] no fever    Answer Assessment - Initial Assessment Questions  1. SYMPTOM: \"What's the main symptom you're concerned about?\" (e.g., redness, pain, drainage)  Drainage and pain  2. ONSET: \"When did *No Answer*  start?\"   since surgery  3. SURGERY: \"What surgery was performed?\"    Removed breast expander and drained abcesses  4. DATE of SURGERY: \"When was surgery performed?\"   08/10/2021  5. INCISION SITE: \"Where is the incision located?\"    left breast area  6. REDNESS: \"Is there any redness at the incision site?\" If " "yes, ask: \"How wide across is the redness?\" (Inches, centimeters)     Denies redness  7. PAIN: \"Is there any pain?\" If Yes, ask: \"How bad is it?\"  (Scale 1-10; or mild, moderate, severe)   6/10   Is out of pain medciation Taking OTC Tylenol  8. BLEEDING: \"Is there any bleeding?\" If Yes, ask: \"How much?\" and \"Where?\"  no  9. DRAINAGE: \"Is there any drainage from the incision site?\" If yes, ask: \"What color and how much?\" (e.g., red, cloudy, pus; drops, teaspoon)   has a drain tube yellow drainage,   drainage from incision itself yellowish on Bactrium  10. FEVER: \"Do you have a fever?\" If Yes, ask: \"What is your temperature, how was it measured, and when did it start?\"       denies  11. OTHER SYMPTOMS: \"Do you have any other symptoms?\" (e.g., shaking chills, weakness, rash elsewhere on body)  No fever, chills    Protocols used: POST-OP INCISION SYMPTOMS AND QUESTIONS-ADULT-AH      "

## 2021-08-20 ENCOUNTER — READMISSION MANAGEMENT (OUTPATIENT)
Dept: CALL CENTER | Facility: HOSPITAL | Age: 46
End: 2021-08-20

## 2021-08-20 NOTE — OUTREACH NOTE
General Surgery Week 2 Survey      Responses   [facility] patient discharged from?  [place]   Does the patient have one of the following disease processes/diagnoses(primary or secondary)?  General Surgery   Week 2 attempt successful?  No   Unsuccessful attempts  Attempt 1          Yulissa Riley RN

## 2021-08-24 ENCOUNTER — OFFICE VISIT (OUTPATIENT)
Dept: WOUND CARE | Facility: HOSPITAL | Age: 46
End: 2021-08-24

## 2021-08-24 PROCEDURE — G0463 HOSPITAL OUTPT CLINIC VISIT: HCPCS

## 2021-08-31 ENCOUNTER — OFFICE VISIT (OUTPATIENT)
Dept: WOUND CARE | Facility: HOSPITAL | Age: 46
End: 2021-08-31

## 2021-08-31 PROCEDURE — G0463 HOSPITAL OUTPT CLINIC VISIT: HCPCS

## 2021-09-07 ENCOUNTER — TELEPHONE (OUTPATIENT)
Dept: SURGERY | Facility: CLINIC | Age: 46
End: 2021-09-07

## 2021-09-07 NOTE — TELEPHONE ENCOUNTER
Pt No Showed to day for her appt with Dr PEREZ,  This has been rescheduled to,    Mailed appt reminder, also lm.  11-18-21 at 12:00    Christi

## 2021-10-12 ENCOUNTER — OFFICE VISIT (OUTPATIENT)
Dept: WOUND CARE | Facility: HOSPITAL | Age: 46
End: 2021-10-12

## 2021-10-12 PROCEDURE — G0463 HOSPITAL OUTPT CLINIC VISIT: HCPCS

## 2021-12-01 ENCOUNTER — TELEPHONE (OUTPATIENT)
Dept: SURGERY | Facility: CLINIC | Age: 46
End: 2021-12-01

## 2021-12-01 NOTE — TELEPHONE ENCOUNTER
Pt notified that due to scheduling conflicts we need to reschedule her appt with Dr Tim on 12/17/2021.  Pt was very understanding. I told her we will be calling her back soon with another appt option.     CMA

## 2022-01-12 ENCOUNTER — OFFICE VISIT (OUTPATIENT)
Dept: CARDIOLOGY | Facility: CLINIC | Age: 47
End: 2022-01-12

## 2022-01-12 VITALS
DIASTOLIC BLOOD PRESSURE: 102 MMHG | HEIGHT: 64 IN | WEIGHT: 161 LBS | SYSTOLIC BLOOD PRESSURE: 154 MMHG | BODY MASS INDEX: 27.49 KG/M2 | HEART RATE: 102 BPM

## 2022-01-12 DIAGNOSIS — I35.1 NONRHEUMATIC AORTIC VALVE INSUFFICIENCY: Chronic | ICD-10-CM

## 2022-01-12 DIAGNOSIS — R03.0 TRANSIENT ELEVATED BLOOD PRESSURE: ICD-10-CM

## 2022-01-12 DIAGNOSIS — I34.0 NONRHEUMATIC MITRAL VALVE REGURGITATION: Chronic | ICD-10-CM

## 2022-01-12 DIAGNOSIS — C50.212 CARCINOMA OF UPPER-INNER QUADRANT OF LEFT BREAST IN FEMALE, ESTROGEN RECEPTOR POSITIVE: ICD-10-CM

## 2022-01-12 DIAGNOSIS — Z17.0 CARCINOMA OF UPPER-INNER QUADRANT OF LEFT BREAST IN FEMALE, ESTROGEN RECEPTOR POSITIVE: ICD-10-CM

## 2022-01-12 DIAGNOSIS — I38 VALVULAR HEART DISEASE: ICD-10-CM

## 2022-01-12 DIAGNOSIS — Z01.810 ENCOUNTER FOR PRE-OPERATIVE CARDIOVASCULAR CLEARANCE: Primary | ICD-10-CM

## 2022-01-12 DIAGNOSIS — J84.10 PULMONARY FIBROSIS: ICD-10-CM

## 2022-01-12 DIAGNOSIS — C81.90 HODGKIN LYMPHOMA, UNSPECIFIED HODGKIN LYMPHOMA TYPE, UNSPECIFIED BODY REGION: Chronic | ICD-10-CM

## 2022-01-12 DIAGNOSIS — I34.2 NONRHEUMATIC MITRAL VALVE STENOSIS: Chronic | ICD-10-CM

## 2022-01-12 DIAGNOSIS — R00.0 TACHYCARDIA: ICD-10-CM

## 2022-01-12 DIAGNOSIS — R94.31 PROLONGED Q-T INTERVAL ON ECG: ICD-10-CM

## 2022-01-12 PROBLEM — I50.32 CHRONIC DIASTOLIC HEART FAILURE (HCC): Status: RESOLVED | Noted: 2021-08-07 | Resolved: 2022-01-12

## 2022-01-12 PROCEDURE — 93000 ELECTROCARDIOGRAM COMPLETE: CPT | Performed by: NURSE PRACTITIONER

## 2022-01-12 PROCEDURE — 99214 OFFICE O/P EST MOD 30 MIN: CPT | Performed by: NURSE PRACTITIONER

## 2022-01-12 RX ORDER — METOPROLOL SUCCINATE 50 MG/1
50 TABLET, EXTENDED RELEASE ORAL 2 TIMES DAILY
Qty: 180 TABLET | Refills: 0 | Status: SHIPPED | OUTPATIENT
Start: 2022-01-12 | End: 2022-04-12

## 2022-01-12 RX ORDER — METOPROLOL SUCCINATE 50 MG/1
50 TABLET, EXTENDED RELEASE ORAL 2 TIMES DAILY
Qty: 180 TABLET | Refills: 0 | Status: SHIPPED | OUTPATIENT
Start: 2022-01-12 | End: 2022-01-12 | Stop reason: SDUPTHER

## 2022-01-12 NOTE — PROGRESS NOTES
Date of Office Visit: 2022  Encounter Provider: DUANE Bradford  Place of Service: [facility] CARDIOLOGY  Patient Name: Jina Huddleston  :1975  Primary Cardiologist: Dr. Jovany Carroll     Chief Complaint   Patient presents with   • Pre-op Exam   :     HPI: Jina Huddleston is a pleasant 46 y.o. female who presents today for perioperative cardiac risk assessment.  She is a new patient to me and have reviewed her medical records.    She has been diagnosed with breast cancer and underwent bilateral mastectomy in .  During that hospitalization, Dr. Carroll consulted for tachycardia and a heart murmur.  Echocardiogram showed normal LVEF, moderate mitral regurgitation, mild to moderate mitral stenosis, and mild aortic insufficiency.  She reported a history of Hodgkin's lymphoma and underwent chest radiation.    In May 2021, she was hospitalized at [facility] with presumed diastolic heart failure.  She says she had lower extremity edema and underwent diuresis with furosemide.  There was question whether or not she had the COVID-19 virus.      In 2021, she followed up with Dr. Carroll in the office.  Dr. Carroll reviewed the records from [facility].  Echocardiogram showed LVEF of 50%, mild mitral stenosis, and moderate to severe mitral regurgitation.  Chest x-ray showed no vascular congestion.  Right heart cath showed a mean PA pressure of 27 and wedge pressure of 19.  Chest CT showed no PE, but was noted to have bilateral lung fibrosis secondary to radiation.  In the visit, she reported dyspnea, lower extremity edema, palpitations, generalized fatigue, less pressure, and dizziness/lightheadedness.  Dr. Carroll ordered a 2D echocardiogram which showed normal LVEF, GLS abnormal at -10.8%, moderate mitral regurgitation, mild to moderate mitral valve stenosis, and mild to moderate aortic insufficiency.  She had frequent strep throat as a child and he wondered if she could have had  rheumatic fever.    She presents today for perioperative cardiac risk assessment. She says she needs to undergo a TRAM-flap next couple of months by Dr. Wyman. She says sometimes she wakes up feeling like there is fluid in her chest and she can hear wheezing. I have recommended further discussion with her pulmonologist as she has pulmonary fibrosis. She denies chest pain, shortness of air, palpitations, edema, dizziness, syncope, or bleeding. Her blood pressure and heart rate are both elevated today. At home her blood pressure is running 130-150/80-80s.      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, estrogen receptor positive (HCC) 3/31/2020   • Drug therapy     For Hodgkin's Lymphoma   • GERD (gastroesophageal reflux disease)    • Heart murmur    • History of chemotherapy 1992   • Hodgkin lymphoma (HCC) 01/1992   • Hx of radiation therapy 1992    Chest wall for Hodgkin's   • Low back pain    • Marijuana use    • MRSA infection 10/2020    right little toe after a surgery  [facility]   • Neuropathy     FINGERS   • Nonrheumatic aortic valve insufficiency 1/1/1992   • Nonrheumatic mitral valve stenosis 1/1/1992       Past Surgical History:   Procedure Laterality Date   • BREAST ABSCESS INCISION AND DRAINAGE Left 7/14/2020    Procedure: INCISION AND DRAINAGE OF LEFT BREAST SEROMA,;  Surgeon: Radha Guevaar MD;  Location: [facility];  Service: Plastics;  Laterality: Left;   • BREAST ABSCESS INCISION AND DRAINAGE Left 8/10/2021    Procedure: LEFT BREAST ABSCESS INCISION AND DRAINAGE, REMOVAL OF EXPANDER;  Surgeon: Radha Guevara MD;  Location: [facility];  Service: Plastics;  Laterality: Left;   • BREAST BIOPSY     • BREAST BIOPSY     • BREAST IMPLANT SURGERY Left 7/14/2020    Procedure: REMOVAL OF TISSUE  EXPANDER;  Surgeon: Radha Guevara MD;  Location: [facility] OR;  Service: Plastics;  Laterality: Left;   • BREAST RECONSTRUCTION Bilateral 2020    Procedure: BILATERAL BREAST RECONSTRUCTION WITH TISSUE EXPANDERSAND ALLODERM;  Surgeon: Radha Guevara MD;  Location: [facility] OR;  Service: Plastics;  Laterality: Bilateral;   • BREAST TISSUE EXPANDER INSERTION Left 2021    Procedure: DELAYED LEFT BREAST INSERTION OF TISSUE EXPANDER;  Surgeon: Radha Guevara MD;  Location: [facility] OR;  Service: Plastics;  Laterality: Left;   • COLONOSCOPY     • CYST REMOVAL      HAND   • DIAGNOSTIC LAPAROSCOPY     • ENDOSCOPY     • HYSTERECTOMY     • INSERTION OF BREAST TISSUE EXPANDER Bilateral    • KNEE SURGERY Left    • MASTECTOMY W/ SENTINEL NODE BIOPSY Bilateral 2020    Procedure: bilateral total mastectomy, bilateral sentinel node biopsy;  Surgeon: Shreya Tim MD;  Location: [facility];  Service: General;  Laterality: Bilateral;   • MULTIPLE TOOTH EXTRACTIONS     • TOE SURGERY Right    • TUBAL ABDOMINAL LIGATION     • VENOUS ACCESS DEVICE (PORT) INSERTION AND REMOVAL         Social History     Socioeconomic History   • Marital status:    Tobacco Use   • Smoking status: Former Smoker     Packs/day: 0.50     Years: 33.00     Pack years: 16.50     Types: Cigarettes     Start date:      Quit date: 10/18/2020     Years since quittin.2   • Smokeless tobacco: Never Used   Vaping Use   • Vaping Use: Never used   Substance and Sexual Activity   • Alcohol use: Not Currently     Comment: 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    Review of Systems   Constitutional: Negative.   Cardiovascular: Negative.    Respiratory: Positive for wheezing.    Hematologic/Lymphatic: Negative.    Neurological: Negative.        No Known Allergies      Current Outpatient Medications:   •  albuterol (ACCUNEB) 1.25 MG/3ML nebulizer solution, Take 1 ampule by nebulization Every 6 (Six) Hours As Needed for Wheezing., Disp: , Rfl:   •  albuterol (PROAIR RESPICLICK) 108 (90 Base) MCG/ACT inhaler, Inhale 2 puffs Every 4 (Four) Hours As Needed for Wheezing., Disp: , Rfl:   •  anastrozole (ARIMIDEX) 1 MG tablet, Take 1 mg by mouth Daily., Disp: , Rfl:   •  Ascorbic Acid (Vitamin C) 100 MG chewable tablet, Chew 500 mg Daily., Disp: , Rfl:   •  Calcium Citrate-Vitamin D (CALCIUM + D PO), Take 1 tablet by mouth Every Morning., Disp: , Rfl:   •  cetirizine (zyrTEC) 10 MG tablet, Take 10 mg by mouth Daily. as directed, Disp: , Rfl:   •  desvenlafaxine (PRISTIQ) 50 MG 24 hr tablet, Take 50 mg by mouth Every Night., Disp: , Rfl:   •  diazePAM (VALIUM) 2 MG tablet, Take 5 mg by mouth 2 (Two) Times a Day As Needed for Anxiety., Disp: , Rfl:   •  docosanol (ABREVA) 10 % cream cream, Apply 1 application topically to the appropriate area as directed As Needed., Disp: , Rfl:   •  fluticasone (FLONASE) 50 MCG/ACT nasal spray, 2 sprays into the nostril(s) as directed by provider Daily., Disp: , Rfl:   •  furosemide (LASIX) 20 MG tablet, Take 1 tablet by mouth Daily., Disp: 90 tablet, Rfl: 2  •  meloxicam (MOBIC) 15 MG tablet, Take 15 mg by mouth Daily., Disp: , Rfl:   •  metoprolol succinate XL (Toprol XL) 25 MG 24 hr tablet, Take 1 tablet by mouth 2 (two) times a day., Disp: 180 tablet, Rfl: 2  •  pantoprazole (PROTONIX) 20 MG EC tablet, Take 20 mg by mouth 2 (two) times a day., Disp: , Rfl:   •   "Symbicort 160-4.5 MCG/ACT inhaler, Inhale 2 puffs 2 (Two) Times a Day., Disp: , Rfl:   •  Zinc 30 MG capsule, Take 30 mg by mouth Daily., Disp: , Rfl:         Objective:     Vitals:    01/12/22 1039 01/12/22 1056   BP: 130/100 (!) 154/102   BP Location: Left arm Right arm   Pulse: 102    Weight: 73 kg (161 lb)    Height: 162.6 cm (64\")      Body mass index is 27.64 kg/m².    PHYSICAL EXAM:    Vitals Reviewed.   General Appearance: No acute distress, well developed and well nourished.   Eyes: Conjunctiva and lids: No erythema, swelling, or discharge. Sclera non-icteric. Glasses.   HENT: Atraumatic, normocephalic. External eyes, ears, and nose normal. No hearing loss noted. Mucous membranes normal. Lips not cyanotic. Neck supple with no tenderness. Wearing mask.   Respiratory: No signs of respiratory distress. Respiration rhythm and depth normal.   Clear to auscultation. No rales, crackles, rhonchi, or wheezing auscultated.   Cardiovascular:  Jugular Venous Pressure: Normal  Heart Rate and Rhythm: Normal, Heart Sounds: Normal S1 and S2. No S3 or S4 noted.  Murmurs: No murmurs noted. No rubs, thrills, or gallops.   Lower Extremities: No edema noted.  Gastrointestinal:  Abdomen soft, non-distended, non-tender.    Musculoskeletal: Normal movement of extremities.  Skin and Nails: General appearance normal. No pallor, cyanosis, diaphoresis. Skin temperature normal. No clubbing of fingernails. Left breast mastectomy.  Psychiatric: Patient alert and oriented to person, place, and time. Speech and behavior appropriate. Normal mood and affect.       ECG 12 Lead    Date/Time: 1/12/2022 10:45 AM  Performed by: Ivy Vera APRN  Authorized by: Ivy Vera APRN   Comparison: not compared with previous ECG   Rhythm: sinus rhythm  Rate: tachycardic  BPM: 102  Conduction: conduction normal  ST Segments: ST segments normal  T Waves: T waves normal  QRS axis: normal    Clinical impression: non-specific ECG  Comments: " Borderline long QT interval              Assessment:       Diagnosis Plan   1. Encounter for pre-operative cardiovascular clearance     2. Valvular heart disease     3. Nonrheumatic mitral valve stenosis     4. Nonrheumatic mitral valve regurgitation     5. Nonrheumatic aortic valve insufficiency     6. Transient elevated blood pressure     7. Tachycardia     8. Pulmonary fibrosis (HCC)     9. Prolonged Q-T interval on ECG     10. Hodgkin lymphoma, unspecified Hodgkin lymphoma type, unspecified body region (HCC)     11. 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 Perioperative Cardiac Risk Assessment: She denies any chest pain, shortness of breath, or palpitations. Patient is considered at acceptable risk for surgery from a cardiovascular standpoint. According to the Philip's Revised Cardiac Risk Index, patient is considered very low risk (0.4%) of adverse cardiovascular events occurring with moderate risk surgery.     2-5: Valvular Heart Disease: Echocardiogram in July 2021 showed aortic valve calcification, mild to moderate aortic valve regurgitation, moderate mitral annular calcification, moderate mitral valve regurgitation, and mild to moderate mitral valve stenosis. Repeat echo in July 2022.    6/7. Transient Elevated Blood Pressure and Tachycardia: Increase metoprolol to 50 mg twice per day. I will follow-up with her via telephone in a few weeks for reassessment.    8. Pulmonary Fibrosis: She reports sometimes she feels fluid in her chest and can hear wheezing. I have recommended that she discuss with her pulmonologist.    9. QT Intervals: Borderline prolonged on today's EKG and this may be due to the tachycardia. Continue to monitor.    10/11. History of Hodgkin's lymphoma and breast cancer.    12. I recommended a 6-month follow-up visit with Dr. Jovany Carroll.    As always, it has been a pleasure to participate in your patient's care.  Thank you.       Sincerely,         DUANE Farias  [facility] Cardiology      · Dictated utilizing Dragon Dictation  · COVID-19 Precautions - Patient was compliant in wearing a mask. When I saw the patient, I used appropriate personal protective equipment (PPE) including mask and eye shield (standard procedure).  Additionally, I used gown and gloves if indicated.  Hand hygiene was completed before and after seeing the patient.  · I spent 30 minutes reviewing her medical records/testing/previous office notes/labs, face-to-face interaction with patient, physical examination, formulating the plan of care, and discussion of plan of care with patient.

## 2022-01-26 ENCOUNTER — TELEPHONE (OUTPATIENT)
Dept: SURGERY | Facility: CLINIC | Age: 47
End: 2022-01-26

## 2022-01-26 NOTE — TELEPHONE ENCOUNTER
Caller: LEW SHELL     Relationship to patient: SELF     Best call back number: [phone]    Patient is needing: PT WANTED TO SEE ABOUT RESCHEDULING AN APPT AND TO INFORM  OF CANCER DX OF BREAST

## 2022-01-26 NOTE — TELEPHONE ENCOUNTER
Patient called to let us know she likely has lung mets, she had a CT chest and PET scan @ [facility], I have requested those records.  She has seen a thoracic surgeon in [place] Dr. Rick and is planning to have it biopsied Friday.      Records requested from Dr. Luis Angel Callahan and Dr. Rick Thoracic surgeon.

## 2022-01-27 ENCOUNTER — TELEPHONE (OUTPATIENT)
Dept: SURGERY | Facility: CLINIC | Age: 47
End: 2022-01-27

## 2022-01-27 NOTE — TELEPHONE ENCOUNTER
Patient had [place] January 22, 2022 for shortness of breath and chest pain a CTA PE protocol: This showed interval development medial right upper lobe mass abutting the right margin of the anterior mediastinum measuring 4 x 3.5 cm. Also numerous small noncalcified pulmonary nodules bilaterally.  Pathologically enlarged bilateral hilar lymph nodes.  Tissue expander on the right. Bilateral mastectomy.  Impression no pulmonary embolus. Findings are compatible with interval metastatic disease progression with new right medial upper lobe mass and scattered small bilateral metastatic pulmonary nodules and metastatic hilar lymphadenopathy.  PET scan January 25, 2022 [facility] PET/CT skull base to mid thigh. New pulmonary nodules and a new medial right upper lobe mass, hyper bit of metabolic with an SUV of 8.1. New right hilar hypermetabolic adenopathy. New pulmonary nodules left upper lobe and left lower lobe that are neither PET avid. New hypermetabolic pericardial lymph node.  Impression new pulmonary nodules with lymph node metastases in the chest.      She will not need to see us back but we will make sure that she has an appt with her medical oncologist.

## 2022-01-27 NOTE — TELEPHONE ENCOUNTER
Patient being treated for metastatic disease   Her next follow-up appointment with her medical oncologist (Dr. Luis Angel Callahan) is pending   Her thoracic surgery with Dr. Rick.     She does not need to see us back for follow-up.

## 2022-03-14 ENCOUNTER — TELEPHONE (OUTPATIENT)
Dept: CARDIOLOGY | Facility: CLINIC | Age: 47
End: 2022-03-14

## 2022-03-14 NOTE — TELEPHONE ENCOUNTER
In January, I increased her metoprolol to 50 mg twice per day.  Please call her to see how she is doing on the increased dose and reassess her blood pressure and heart rate.    Does she need a new prescription?  Thank you.

## 2022-03-14 NOTE — TELEPHONE ENCOUNTER
Pt is in [facility] right now per son, should I call her or should we reset our reminder to contact her later.    dd

## 2022-03-22 NOTE — TELEPHONE ENCOUNTER
She needs to follow-up in our office post hospitalization.  Please arrange an appointment.  Thank you.

## 2022-03-22 NOTE — TELEPHONE ENCOUNTER
Pt states she had a heart attack on 3/8/22 after her chemo treatment. Had a left and right heart cath performed, dx'd with stage 3b lung cancer.  7 new tumors through out her chest and lungs. [facility] has started her on hydralazine and her BP is still running 140-155/ she now has four stents in her gallbladder and one stent in her pancreas.     dd

## 2022-03-31 PROBLEM — D69.6 THROMBOCYTOPENIA (HCC): Status: ACTIVE | Noted: 2022-03-31

## 2022-03-31 PROBLEM — I25.10 CORONARY ARTERY DISEASE INVOLVING NATIVE CORONARY ARTERY OF NATIVE HEART WITHOUT ANGINA PECTORIS: Status: ACTIVE | Noted: 2022-03-31

## 2022-03-31 PROBLEM — I25.5 ISCHEMIC CARDIOMYOPATHY: Status: ACTIVE | Noted: 2022-03-31

## 2022-04-01 ENCOUNTER — OFFICE VISIT (OUTPATIENT)
Dept: CARDIOLOGY | Facility: CLINIC | Age: 47
End: 2022-04-01

## 2022-04-01 VITALS
SYSTOLIC BLOOD PRESSURE: 124 MMHG | OXYGEN SATURATION: 96 % | HEIGHT: 64 IN | BODY MASS INDEX: 26.36 KG/M2 | DIASTOLIC BLOOD PRESSURE: 84 MMHG | WEIGHT: 154.4 LBS | HEART RATE: 96 BPM

## 2022-04-01 DIAGNOSIS — R94.31 PROLONGED Q-T INTERVAL ON ECG: ICD-10-CM

## 2022-04-01 DIAGNOSIS — D69.6 THROMBOCYTOPENIA: ICD-10-CM

## 2022-04-01 DIAGNOSIS — I25.5 ISCHEMIC CARDIOMYOPATHY: ICD-10-CM

## 2022-04-01 DIAGNOSIS — I38 VALVULAR HEART DISEASE: Primary | ICD-10-CM

## 2022-04-01 DIAGNOSIS — I25.10 CORONARY ARTERY DISEASE INVOLVING NATIVE CORONARY ARTERY OF NATIVE HEART WITHOUT ANGINA PECTORIS: ICD-10-CM

## 2022-04-01 DIAGNOSIS — J84.10 PULMONARY FIBROSIS: ICD-10-CM

## 2022-04-01 PROCEDURE — 93000 ELECTROCARDIOGRAM COMPLETE: CPT | Performed by: NURSE PRACTITIONER

## 2022-04-01 PROCEDURE — 99214 OFFICE O/P EST MOD 30 MIN: CPT | Performed by: NURSE PRACTITIONER

## 2022-04-01 RX ORDER — POTASSIUM CHLORIDE 20 MEQ/1
20 TABLET, EXTENDED RELEASE ORAL DAILY
COMMUNITY
Start: 2022-03-29

## 2022-04-01 RX ORDER — HYDRALAZINE HYDROCHLORIDE 50 MG/1
50 TABLET, FILM COATED ORAL 2 TIMES DAILY
COMMUNITY
Start: 2022-03-24 | End: 2022-07-26 | Stop reason: SDUPTHER

## 2022-04-01 RX ORDER — FUROSEMIDE 40 MG/1
80 TABLET ORAL 2 TIMES DAILY
Qty: 180 TABLET | Refills: 3 | Status: SHIPPED | OUTPATIENT
Start: 2022-04-01 | End: 2022-04-01

## 2022-04-01 RX ORDER — FUROSEMIDE 20 MG/1
40 TABLET ORAL 2 TIMES DAILY
Qty: 60 TABLET | Refills: 3 | Status: SHIPPED | OUTPATIENT
Start: 2022-04-01

## 2022-04-01 RX ORDER — AMANTADINE HYDROCHLORIDE 100 MG/1
TABLET ORAL
COMMUNITY
Start: 2022-03-07 | End: 2022-07-26 | Stop reason: ALTCHOICE

## 2022-04-01 RX ORDER — OXYCODONE AND ACETAMINOPHEN 10; 325 MG/1; MG/1
TABLET ORAL
COMMUNITY
Start: 2022-03-07 | End: 2022-09-26

## 2022-04-12 RX ORDER — METOPROLOL SUCCINATE 50 MG/1
TABLET, EXTENDED RELEASE ORAL
Qty: 180 TABLET | Refills: 0 | Status: SHIPPED | OUTPATIENT
Start: 2022-04-12 | End: 2022-10-20 | Stop reason: SDUPTHER

## 2022-07-26 ENCOUNTER — HOSPITAL ENCOUNTER (OUTPATIENT)
Dept: CARDIOLOGY | Facility: HOSPITAL | Age: 47
Discharge: HOME | End: 2022-07-26
Admitting: NURSE PRACTITIONER

## 2022-07-26 ENCOUNTER — OFFICE VISIT (OUTPATIENT)
Dept: CARDIOLOGY | Facility: CLINIC | Age: 47
End: 2022-07-26

## 2022-07-26 VITALS
SYSTOLIC BLOOD PRESSURE: 120 MMHG | WEIGHT: 154 LBS | OXYGEN SATURATION: 98 % | HEART RATE: 97 BPM | DIASTOLIC BLOOD PRESSURE: 78 MMHG | BODY MASS INDEX: 26.29 KG/M2 | HEIGHT: 64 IN

## 2022-07-26 VITALS
WEIGHT: 138 LBS | DIASTOLIC BLOOD PRESSURE: 80 MMHG | SYSTOLIC BLOOD PRESSURE: 114 MMHG | HEIGHT: 64 IN | HEART RATE: 95 BPM | BODY MASS INDEX: 23.56 KG/M2

## 2022-07-26 DIAGNOSIS — C50.212 CARCINOMA OF UPPER-INNER QUADRANT OF LEFT BREAST IN FEMALE, ESTROGEN RECEPTOR POSITIVE: ICD-10-CM

## 2022-07-26 DIAGNOSIS — Z17.0 CARCINOMA OF UPPER-INNER QUADRANT OF LEFT BREAST IN FEMALE, ESTROGEN RECEPTOR POSITIVE: ICD-10-CM

## 2022-07-26 DIAGNOSIS — I34.0 NONRHEUMATIC MITRAL VALVE REGURGITATION: ICD-10-CM

## 2022-07-26 DIAGNOSIS — C81.90 HODGKIN LYMPHOMA, UNSPECIFIED HODGKIN LYMPHOMA TYPE, UNSPECIFIED BODY REGION: Chronic | ICD-10-CM

## 2022-07-26 DIAGNOSIS — I34.2 NONRHEUMATIC MITRAL VALVE STENOSIS: Chronic | ICD-10-CM

## 2022-07-26 DIAGNOSIS — I34.0 NONRHEUMATIC MITRAL VALVE REGURGITATION: Chronic | ICD-10-CM

## 2022-07-26 DIAGNOSIS — I35.1 NONRHEUMATIC AORTIC VALVE INSUFFICIENCY: ICD-10-CM

## 2022-07-26 DIAGNOSIS — I34.2 NONRHEUMATIC MITRAL VALVE STENOSIS: ICD-10-CM

## 2022-07-26 DIAGNOSIS — Z92.3 HX OF RADIATION THERAPY: Chronic | ICD-10-CM

## 2022-07-26 DIAGNOSIS — I35.1 NONRHEUMATIC AORTIC VALVE INSUFFICIENCY: Primary | Chronic | ICD-10-CM

## 2022-07-26 DIAGNOSIS — Z92.21 HISTORY OF CHEMOTHERAPY: Chronic | ICD-10-CM

## 2022-07-26 DIAGNOSIS — I38 VALVULAR HEART DISEASE: ICD-10-CM

## 2022-07-26 LAB
AORTIC ARCH: 1.6 CM
ASCENDING AORTA: 2.9 CM
BH CV ECHO LEFT VENTRICLE GLOBAL LONGITUDINAL STRAIN: -16.7 %
BH CV ECHO MEAS - ACS: 1.55 CM
BH CV ECHO MEAS - AO MAX PG: 18.2 MMHG
BH CV ECHO MEAS - AO MEAN PG: 8.9 MMHG
BH CV ECHO MEAS - AO ROOT DIAM: 2.45 CM
BH CV ECHO MEAS - AO V2 MAX: 213.1 CM/SEC
BH CV ECHO MEAS - AO V2 VTI: 33.8 CM
BH CV ECHO MEAS - AVA(I,D): 1.04 CM2
BH CV ECHO MEAS - EDV(CUBED): 84.4 ML
BH CV ECHO MEAS - EDV(MOD-SP2): 105 ML
BH CV ECHO MEAS - EDV(MOD-SP4): 96 ML
BH CV ECHO MEAS - EF(MOD-BP): 54 %
BH CV ECHO MEAS - EF(MOD-SP2): 54.3 %
BH CV ECHO MEAS - EF(MOD-SP4): 53.1 %
BH CV ECHO MEAS - ESV(CUBED): 27.6 ML
BH CV ECHO MEAS - ESV(MOD-SP2): 48 ML
BH CV ECHO MEAS - ESV(MOD-SP4): 45 ML
BH CV ECHO MEAS - FS: 31.1 %
BH CV ECHO MEAS - IVS/LVPW: 1.01 CM
BH CV ECHO MEAS - IVSD: 0.9 CM
BH CV ECHO MEAS - LAT PEAK E' VEL: 9.1 CM/SEC
BH CV ECHO MEAS - LV MASS(C)D: 126.2 GRAMS
BH CV ECHO MEAS - LV MAX PG: 2.43 MMHG
BH CV ECHO MEAS - LV MEAN PG: 1.47 MMHG
BH CV ECHO MEAS - LV V1 MAX: 78 CM/SEC
BH CV ECHO MEAS - LV V1 VTI: 13.1 CM
BH CV ECHO MEAS - LVIDD: 4.4 CM
BH CV ECHO MEAS - LVIDS: 3 CM
BH CV ECHO MEAS - LVOT AREA: 2.7 CM2
BH CV ECHO MEAS - LVOT DIAM: 1.85 CM
BH CV ECHO MEAS - LVPWD: 0.89 CM
BH CV ECHO MEAS - MED PEAK E' VEL: 7.4 CM/SEC
BH CV ECHO MEAS - MR MAX PG: 134.2 MMHG
BH CV ECHO MEAS - MR MAX VEL: 579.1 CM/SEC
BH CV ECHO MEAS - MV A DUR: 0.11 SEC
BH CV ECHO MEAS - MV A MAX VEL: 132.8 CM/SEC
BH CV ECHO MEAS - MV DEC SLOPE: 543.5 CM/SEC2
BH CV ECHO MEAS - MV DEC TIME: 0.2 MSEC
BH CV ECHO MEAS - MV E MAX VEL: 95.1 CM/SEC
BH CV ECHO MEAS - MV E/A: 0.72
BH CV ECHO MEAS - MV MAX PG: 9.2 MMHG
BH CV ECHO MEAS - MV MEAN PG: 5.7 MMHG
BH CV ECHO MEAS - MV P1/2T: 68 MSEC
BH CV ECHO MEAS - MV V2 VTI: 32.6 CM
BH CV ECHO MEAS - MVA(P1/2T): 3.2 CM2
BH CV ECHO MEAS - MVA(VTI): 1.08 CM2
BH CV ECHO MEAS - PA ACC TIME: 0.12 SEC
BH CV ECHO MEAS - PA PR(ACCEL): 24.3 MMHG
BH CV ECHO MEAS - PA V2 MAX: 98.9 CM/SEC
BH CV ECHO MEAS - PI END-D VEL: 104.5 CM/SEC
BH CV ECHO MEAS - PULM A REVS DUR: 0.08 SEC
BH CV ECHO MEAS - PULM A REVS VEL: 28.7 CM/SEC
BH CV ECHO MEAS - PULM DIAS VEL: 28.7 CM/SEC
BH CV ECHO MEAS - PULM S/D: 1.25
BH CV ECHO MEAS - PULM SYS VEL: 36 CM/SEC
BH CV ECHO MEAS - QP/QS: 1.72
BH CV ECHO MEAS - RAP SYSTOLE: 3 MMHG
BH CV ECHO MEAS - RV MAX PG: 3.1 MMHG
BH CV ECHO MEAS - RV V1 MAX: 88.7 CM/SEC
BH CV ECHO MEAS - RV V1 VTI: 20 CM
BH CV ECHO MEAS - RVOT DIAM: 1.97 CM
BH CV ECHO MEAS - RVSP: 24 MMHG
BH CV ECHO MEAS - SUP REN AO DIAM: 1.9 CM
BH CV ECHO MEAS - SV(LVOT): 35.1 ML
BH CV ECHO MEAS - SV(MOD-SP2): 57 ML
BH CV ECHO MEAS - SV(MOD-SP4): 51 ML
BH CV ECHO MEAS - SV(RVOT): 60.6 ML
BH CV ECHO MEAS - TAPSE (>1.6): 1.95 CM
BH CV ECHO MEAS - TR MAX PG: 21.4 MMHG
BH CV ECHO MEAS - TR MAX VEL: 231.3 CM/SEC
BH CV ECHO MEASUREMENTS AVERAGE E/E' RATIO: 11.53
BH CV XLRA - RV BASE: 2.6 CM
BH CV XLRA - RV LENGTH: 5.9 CM
BH CV XLRA - RV MID: 1.73 CM
BH CV XLRA - TDI S': 12.6 CM/SEC
LEFT ATRIUM VOLUME INDEX: 23.4 ML/M2
LV EF 2D ECHO EST: 55 %
MAXIMAL PREDICTED HEART RATE: 171 BPM
SINUS: 2.9 CM
STJ: 2.29 CM
STRESS TARGET HR: 145 BPM

## 2022-07-26 PROCEDURE — 25010000002 PERFLUTREN (DEFINITY) 8.476 MG IN SODIUM CHLORIDE (PF) 0.9 % 10 ML INJECTION: Performed by: NURSE PRACTITIONER

## 2022-07-26 PROCEDURE — 99214 OFFICE O/P EST MOD 30 MIN: CPT | Performed by: INTERNAL MEDICINE

## 2022-07-26 PROCEDURE — 93306 TTE W/DOPPLER COMPLETE: CPT | Performed by: INTERNAL MEDICINE

## 2022-07-26 PROCEDURE — 93356 MYOCRD STRAIN IMG SPCKL TRCK: CPT

## 2022-07-26 PROCEDURE — 93356 MYOCRD STRAIN IMG SPCKL TRCK: CPT | Performed by: INTERNAL MEDICINE

## 2022-07-26 PROCEDURE — 93306 TTE W/DOPPLER COMPLETE: CPT

## 2022-07-26 RX ORDER — HYDRALAZINE HYDROCHLORIDE 50 MG/1
50 TABLET, FILM COATED ORAL 2 TIMES DAILY
Qty: 180 TABLET | Refills: 3 | Status: SHIPPED | OUTPATIENT
Start: 2022-07-26

## 2022-07-26 RX ORDER — METHADONE HYDROCHLORIDE 5 MG/1
5 TABLET ORAL TAKE AS DIRECTED
COMMUNITY
End: 2022-09-26

## 2022-07-26 RX ORDER — PROMETHAZINE HYDROCHLORIDE 25 MG/1
25 TABLET ORAL EVERY 6 HOURS PRN
COMMUNITY
Start: 2022-04-19

## 2022-07-26 RX ORDER — APIXABAN 5 MG/1
5 TABLET, FILM COATED ORAL DAILY
COMMUNITY
Start: 2022-05-14

## 2022-07-26 RX ORDER — LORAZEPAM 1 MG/1
1 TABLET ORAL DAILY
COMMUNITY

## 2022-07-26 RX ORDER — IPRATROPIUM BROMIDE 17 UG/1
AEROSOL, METERED RESPIRATORY (INHALATION)
COMMUNITY
Start: 2022-04-06

## 2022-07-26 RX ORDER — DEXAMETHASONE 4 MG/1
TABLET ORAL TAKE AS DIRECTED
COMMUNITY
Start: 2022-05-24 | End: 2022-09-23 | Stop reason: ALTCHOICE

## 2022-07-26 RX ORDER — PROCHLORPERAZINE MALEATE 10 MG
10 TABLET ORAL EVERY 6 HOURS PRN
COMMUNITY
Start: 2022-07-14

## 2022-07-26 RX ADMIN — PERFLUTREN 1.5 ML: 6.52 INJECTION, SUSPENSION INTRAVENOUS at 13:49

## 2022-07-26 NOTE — PROGRESS NOTES
Subjective:     Encounter Date:07/26/22      Patient ID: Jina Huddleston is a 47 y.o. female.    Chief Complaint:  History of Present Illness    Dear Dalton ZEPEDA,    I had the pleasure seeing this patient in the office today.     She comes in today for follow-up echocardiogram on her valvular heart disease.  She has a history of mitral regurgitation, mitral stenosis, and aortic insufficiency.    Chemotherapy is currently on hold for her metastatic stage III lung cancer pending the results on the echocardiogram today.    She denies any chest pain.  She does have shortness of breath.  She is on chronic oxygen therapy.  She is having a lot of coughing that is attributable to the lung cancer.    She was hospitalized at [facility] March 10, 2022.  She had presented to [facility] and he was transferred to [facility] with a non-ST segment elevation myocardial infarction.  Cardiac catheterization showed 107 chronic nonocclusive RCA at the ostium, faint collaterals from the LAD, ejection rate 40%.  She also was having acute cholecystitis and had a cutaneous cholecystostomy drain placement on March 15 and ERCP pancreatic duct stent placement, biliary duct stent placement on 318.  Patient was initially on both aspirin and Plavix after the cardiac catheterization however both were stopped due to thrombocytopenia and an intraperitoneal hematoma.  She required 6 units packed red blood cells and 6 units of platelets.    She had an echo in March 11, 2022 that was technically difficult and showed mild LVH, ejection fraction 25% biatrial enlargement, normal RV size and function mild MR, no mention of mitral stenosis, and mild aortic insufficiency.    I saw her July 13, 2020 when she was hospitalized.  She presented the emergency room with complaint of left breast pain due to a tissue expander, she had recently had a bilateral mastectomy.  She was noted to have some tachycardia in the emergency room as well as a heart murmur  "so she had an echocardiogram.  This demonstrated moderate mitral regurgitation, mild to moderate mitral stenosis, mild aortic insufficiency.  She had never been told before that that she had a heart murmur.  She has a history of Hodgkin's lymphoma and had total chest radiation at that time.  She has a history of left breast cancer felt secondary to the radiation status post bilateral mastectomy    Patient informs me that in May she was hospitalized at [facility] with heart failure.  I have some of the records from [facility].  They perform an echocardiogram May 23 that showed an ejection fraction 50%, normal RV function, mild mitral stenosis, but they felt was moderate to severe MR at that time, they performed a chest x-ray that apparently showed no cardiopulmonary process.  She had a right heart cath that showed a mean PA pressure of 27, wedge pressure 19.  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  She was treated with steroids for exercise-induced asthma and pulmonary fibrosis.    The following portions of the patient's history were reviewed and updated as appropriate: allergies, current medications, past family history, past medical history, past social history, past surgical history and problem list.    Procedures       Objective:     Vitals:    07/26/22 1354   BP: 114/80   Pulse: 95   Weight: 62.6 kg (138 lb)   Height: 162.6 cm (64\")     Body mass index is 23.69 kg/m².      Vitals reviewed.   Constitutional:       General: Not in acute distress.     Appearance: Well-developed. Not diaphoretic.   Eyes:      General:         Right eye: No discharge.         Left eye: No discharge.      Conjunctiva/sclera: Conjunctivae normal.      Pupils: Pupils are equal, round, and reactive to light.   HENT:      Head: Normocephalic and atraumatic.      Nose: Nose normal.   Neck:      Thyroid: No thyromegaly.      Trachea: No tracheal deviation.      " Lymphadenopathy: No cervical adenopathy.   Pulmonary:      Effort: Pulmonary effort is normal. No respiratory distress.      Breath sounds: Normal breath sounds. No stridor.   Chest:      Chest wall: Not tender to palpatation.   Cardiovascular:      Normal rate. Regular rhythm.      Murmurs: There is a grade 1 to 2/6 mid frequency midsystolic murmur at the URSB, radiating to the neck. There is also a grade 1/6 mid frequency midsystolic murmur at the URSB, radiating to the neck. There is a grade 1/4 high frequency blowing decrescendo, early diastolic murmur at the URSB, radiating to the apex.      . No S3 gallop. No click. No rub.   Pulses:     Intact distal pulses.   Edema:     Peripheral edema present.     Ankle: bilateral trace edema of the ankle.     Feet: bilateral trace edema of the feet.  Abdominal:      General: Bowel sounds are normal. There is no distension.      Palpations: Abdomen is soft. There is no abdominal mass.      Tenderness: There is no abdominal tenderness. There is no guarding or rebound.   Musculoskeletal: Normal range of motion.         General: No tenderness or deformity.      Cervical back: Normal range of motion and neck supple. Skin:     General: Skin is warm and dry.      Findings: No erythema or rash.   Neurological:      Mental Status: Alert and oriented to person, place, and time.      Deep Tendon Reflexes: Reflexes are normal and symmetric.   Psychiatric:         Thought Content: Thought content normal.         Data and records reviewed:     Lab Results   Component Value Date    GLUCOSE 93 08/12/2021    BUN 13 08/12/2021    CREATININE 0.83 08/12/2021    EGFRIFNONA 73 08/12/2021    BCR 15.7 08/12/2021    K 3.9 08/12/2021    CO2 24.7 08/12/2021    CALCIUM 9.3 08/12/2021    ALBUMIN 4.00 08/11/2021    AST 81 (H) 08/11/2021     (H) 08/11/2021     No results found for: CHOL  No results found for: TRIG  No results found for: HDL  No results found for: LDL  No results found for:  VLDL  No results found for: LDLHDL  CBC    CBC 8/10/21 8/11/21 8/12/21   WBC 4.85 7.24 5.75   RBC 3.62 (A) 3.44 (A) 3.37 (A)   Hemoglobin 13.2 12.8 12.3   Hematocrit 39.7 37.0 35.8   .7 (A) 107.6 (A) 106.2 (A)   MCH 36.5 (A) 37.2 (A) 36.5 (A)   MCHC 33.2 34.6 34.4   RDW 13.0 13.1 12.8   Platelets 171 209 187   (A) Abnormal value            No radiology results for the last 90 days.  Results for orders placed during the hospital encounter of 07/26/22    Adult Transthoracic Echo Complete W/ Cont if Necessary Per Protocol    Interpretation Summary  · Estimated right ventricular systolic pressure from tricuspid regurgitation is normal (<35 mmHg). Calculated right ventricular systolic pressure from tricuspid regurgitation is 24 mmHg.  · There is moderate, bileaflet mitral valve thickening present.  · Mild mitral valve stenosis is present. The mitral valve peak gradient is 9.2 mmHg. The mitral valve mean gradient is 5.7 mmHg.  · Moderate mitral valve regurgitation is present.  · Estimated left ventricular EF = 55% Left ventricular systolic function is normal.  · The following left ventricular wall segments are hypokinetic: basal inferior.  GLS equals -16.7.        Assessment:          Diagnosis Plan   1. Nonrheumatic aortic valve insufficiency  hydrALAZINE (APRESOLINE) 50 MG tablet   2. Nonrheumatic mitral valve regurgitation  hydrALAZINE (APRESOLINE) 50 MG tablet   3. Nonrheumatic mitral valve stenosis  hydrALAZINE (APRESOLINE) 50 MG tablet   4. Hx of radiation therapy  hydrALAZINE (APRESOLINE) 50 MG tablet   5. Hodgkin lymphoma, unspecified Hodgkin lymphoma type, unspecified body region (HCC)  hydrALAZINE (APRESOLINE) 50 MG tablet   6. History of chemotherapy  hydrALAZINE (APRESOLINE) 50 MG tablet   7. 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  Valvular heart disease-echocardiogram today reviewed.   Mild mitral stenosis, moderate mitral regurgitation, mild aortic insufficiency, continue to follow.  [place] heart disease felt to be secondary to radiation for Hodgkin's  2.  Heart failure-patient reports hospitalization at [facility] in May 2021 for heart failure.  No evidence of volume overload today  3.  Pulmonary fibrosis, felt secondary to radiation fibrosis, was seen by pulmonary, apparently they just performed a pulmonary function test, we will call and see if we can get those results  4.  History of Hodgkin's lymphoma status post total chest radiation age 16  5.  History of breast cancer, felt secondary to radiation therapy as a child, status post bilateral mastectomy.  6.  CAD- cardiac catheterization reports 100% occlusion of RCA, no other significant disease, continue modification of risk factors as well as medical therapy  7.  Patient is not on antiplatelet therapy because of significant bleed as outlined above  8.  Left upper extremity DVT-patient developed upper extremity DVT in April was in [facility], currently on chronic anticoagulation  9.  Lung cancer-on chronic oxygen therapy, receiving chemotherapy, appropriate to resume chemotherapy, ejection fraction and strain imaging are both normal as outlined above.    Thank you very much for allowing us to participate in the care of this pleasant patient.  Please don't hesitate to call if I can be of assistance in any way.      Current Outpatient Medications:   •  albuterol (ACCUNEB) 1.25 MG/3ML nebulizer solution, Take 1 ampule by nebulization Every 6 (Six) Hours As Needed for Wheezing., Disp: , Rfl:   •  albuterol (PROAIR RESPICLICK) 108 (90 Base) MCG/ACT inhaler, Inhale 2 puffs Every 4 (Four) Hours As Needed for Wheezing., Disp: , Rfl:   •  anastrozole (ARIMIDEX) 1 MG tablet, Take 1 mg by mouth Daily., Disp: , Rfl:   •  Ascorbic Acid (Vitamin C) 100 MG chewable tablet, Chew 500 mg Daily., Disp: , Rfl:   •  Calcium Citrate-Vitamin D (CALCIUM + D PO),  Take 1 tablet by mouth Every Morning., Disp: , Rfl:   •  cetirizine (zyrTEC) 10 MG tablet, Take 10 mg by mouth Daily. as directed, Disp: , Rfl:   •  desvenlafaxine (PRISTIQ) 50 MG 24 hr tablet, Take 50 mg by mouth Every Night., Disp: , Rfl:   •  dexamethasone (DECADRON) 4 MG tablet, Take As Directed., Disp: , Rfl:   •  docosanol (ABREVA) 10 % cream cream, Apply 1 application topically to the appropriate area as directed As Needed., Disp: , Rfl:   •  Eliquis 5 MG tablet tablet, Take 5 mg by mouth Daily., Disp: , Rfl:   •  fluticasone (FLONASE) 50 MCG/ACT nasal spray, 2 sprays into the nostril(s) as directed by provider Daily., Disp: , Rfl:   •  furosemide (LASIX) 20 MG tablet, Take 2 tablets by mouth 2 (Two) Times a Day., Disp: 60 tablet, Rfl: 3  •  hydrALAZINE (APRESOLINE) 50 MG tablet, Take 1 tablet by mouth 2 (Two) Times a Day., Disp: 180 tablet, Rfl: 3  •  ipratropium (Atrovent HFA) 17 MCG/ACT inhaler, 2 puffs po daily, Disp: , Rfl:   •  LORazepam (ATIVAN) 1 MG tablet, Take 1 mg by mouth Daily., Disp: , Rfl:   •  methadone (DOLOPHINE) 5 MG tablet, Take 5 mg by mouth Take As Directed., Disp: , Rfl:   •  metoprolol succinate XL (TOPROL-XL) 50 MG 24 hr tablet, TAKE ONE TABLET BY MOUTH TWICE A DAY, Disp: 180 tablet, Rfl: 0  •  nystatin (MYCOSTATIN) 100,000 unit/mL suspension, As Needed., Disp: , Rfl:   •  oxyCODONE-acetaminophen (PERCOCET)  MG per tablet, TAKE 1 TABLET BY MOUTH EVERY 6 HOURS AS NEEDED FOR CANCER PAIN, Disp: , Rfl:   •  pantoprazole (PROTONIX) 40 MG EC tablet, Take 40 mg by mouth 2 (two) times a day., Disp: , Rfl:   •  potassium chloride (K-DUR,KLOR-CON) 20 MEQ CR tablet, Take 20 mEq by mouth Daily., Disp: , Rfl:   •  prochlorperazine (COMPAZINE) 10 MG tablet, Take 10 mg by mouth Every 6 (Six) Hours As Needed., Disp: , Rfl:   •  promethazine (PHENERGAN) 25 MG tablet, Take 25 mg by mouth Every 6 (Six) Hours As Needed., Disp: , Rfl:   •  Symbicort 160-4.5 MCG/ACT inhaler, Inhale 2 puffs 2 (Two)  Times a Day., Disp: , Rfl:   •  tiotropium (SPIRIVA) 18 MCG per inhalation capsule, Place 1 capsule into inhaler and inhale Daily., Disp: , Rfl:   •  Zinc 30 MG capsule, Take 30 mg by mouth Daily., Disp: , Rfl:   No current facility-administered medications for this visit.         Return in about 1 year (around 7/26/2023).

## 2022-07-27 NOTE — PROGRESS NOTES
Dr. Carroll saw patient in the office yesterday and discussed the results of the echocardiogram with her during the visit.

## 2022-09-16 DIAGNOSIS — I25.5 ISCHEMIC CARDIOMYOPATHY: ICD-10-CM

## 2022-09-16 DIAGNOSIS — C34.90 MALIGNANT NEOPLASM OF LUNG, UNSPECIFIED LATERALITY, UNSPECIFIED PART OF LUNG: Primary | ICD-10-CM

## 2022-09-16 RX ORDER — MORPHINE SULFATE 100 MG/5ML
5 SOLUTION ORAL EVERY 4 HOURS PRN
Qty: 30 ML | Refills: 0 | Status: SHIPPED | OUTPATIENT
Start: 2022-09-16 | End: 2022-09-26 | Stop reason: SDUPTHER

## 2022-09-16 RX ORDER — LORAZEPAM 2 MG/ML
1 CONCENTRATE ORAL EVERY 4 HOURS PRN
Qty: 30 ML | Refills: 0 | Status: SHIPPED | OUTPATIENT
Start: 2022-09-16

## 2022-09-23 DIAGNOSIS — J84.10 PULMONARY FIBROSIS: Primary | ICD-10-CM

## 2022-09-23 RX ORDER — PREDNISONE 20 MG/1
20 TABLET ORAL DAILY
Qty: 14 TABLET | Refills: 1 | Status: SHIPPED | OUTPATIENT
Start: 2022-09-23

## 2022-09-26 ENCOUNTER — TELEPHONE (OUTPATIENT)
Dept: FAMILY MEDICINE CLINIC | Age: 47
End: 2022-09-26

## 2022-09-26 DIAGNOSIS — C78.00 MALIGNANT NEOPLASM METASTATIC TO LUNG, UNSPECIFIED LATERALITY: ICD-10-CM

## 2022-09-26 DIAGNOSIS — C34.90 MALIGNANT NEOPLASM OF LUNG, UNSPECIFIED LATERALITY, UNSPECIFIED PART OF LUNG: ICD-10-CM

## 2022-09-26 DIAGNOSIS — I25.5 ISCHEMIC CARDIOMYOPATHY: ICD-10-CM

## 2022-09-26 DIAGNOSIS — J84.10 PULMONARY FIBROSIS: Primary | ICD-10-CM

## 2022-09-26 RX ORDER — MORPHINE SULFATE 100 MG/5ML
10 SOLUTION ORAL
Qty: 30 ML | Refills: 0 | Status: SHIPPED | OUTPATIENT
Start: 2022-09-26

## 2022-09-26 RX ORDER — CEFDINIR 300 MG/1
300 CAPSULE ORAL 2 TIMES DAILY
Qty: 14 CAPSULE | Refills: 0 | Status: SHIPPED | OUTPATIENT
Start: 2022-09-26

## 2022-09-26 RX ORDER — FENTANYL 50 UG/H
1 PATCH TRANSDERMAL
Qty: 5 PATCH | Refills: 0 | Status: SHIPPED | OUTPATIENT
Start: 2022-09-26

## 2022-09-26 NOTE — TELEPHONE ENCOUNTER
Reported by Bertha to have unrelenting cough with green sputum some blood streaking.  Already on maximum inhaled breathing treatments.  Also taking Tessalon Perles 3 times a day.  We will increase her prednisone to 20 mg.  We will begin cefdinir.    mas

## 2022-10-20 RX ORDER — METOPROLOL SUCCINATE 50 MG/1
50 TABLET, EXTENDED RELEASE ORAL 2 TIMES DAILY
Qty: 180 TABLET | Refills: 0 | Status: SHIPPED | OUTPATIENT
Start: 2022-10-20

## (undated) DEVICE — Device: Brand: FABCO

## (undated) DEVICE — PENCL E/S HNDSWTCH SMOKEEVAC HOLSTR 10FT

## (undated) DEVICE — DRSNG TELFA PAD NONADH STR 1S 3X8IN

## (undated) DEVICE — INTENDED FOR TISSUE SEPARATION, AND OTHER PROCEDURES THAT REQUIRE A SHARP SURGICAL BLADE TO PUNCTURE OR CUT.: Brand: BARD-PARKER ® CARBON RIB-BACK BLADES

## (undated) DEVICE — TOWEL,OR,DSP,ST,BLUE,STD,4/PK,20PK/CS: Brand: MEDLINE

## (undated) DEVICE — TRAP FLD MINIVAC MEGADYNE 100ML

## (undated) DEVICE — SUT ETHLN 4/0 PS2 PLSTC 1667G

## (undated) DEVICE — SPNG GZ WOVN 4X4IN 12PLY 10/BX STRL

## (undated) DEVICE — 1010 S-DRAPE TOWEL DRAPE 10/BX: Brand: STERI-DRAPE™

## (undated) DEVICE — SUT VIC 3/0 TIES 18IN J110T

## (undated) DEVICE — PROXIMATE RH ROTATING HEAD SKIN STAPLERS (35 WIDE) CONTAINS 35 STAINLESS STEEL STAPLES: Brand: PROXIMATE

## (undated) DEVICE — NEEDLE, QUINCKE, 20GX3.5": Brand: MEDLINE

## (undated) DEVICE — ELECTRD BLD EZ CLN MOD 6.5IN

## (undated) DEVICE — NDL HYPO PRECISIONGLIDE/REG 18G 11/2 PNK

## (undated) DEVICE — PATIENT RETURN ELECTRODE, SINGLE-USE, CONTACT QUALITY MONITORING, ADULT, WITH 9FT CORD, FOR PATIENTS WEIGING OVER 33LBS. (15KG): Brand: MEGADYNE

## (undated) DEVICE — Device

## (undated) DEVICE — BNDG ACE ASSORT/COLR 3IN

## (undated) DEVICE — MARKR SKIN W/RULR AND LBL

## (undated) DEVICE — SYR LUERLOK 50ML

## (undated) DEVICE — ELECTRD BLD EXT EDGE/INSUL 6IN

## (undated) DEVICE — STPLR SKIN VISISTAT WD 35CT

## (undated) DEVICE — COVER,LIGHT HANDLE,FLX,2/PK: Brand: MEDLINE INDUSTRIES, INC.

## (undated) DEVICE — SUT PDS 3/0 SH 27IN DYED Z316H

## (undated) DEVICE — NDL HYPO PRECISIONGLIDE REG 25G 1 1/2

## (undated) DEVICE — MAT FLR ABSORBENT LG 4FT 10 2.5FT

## (undated) DEVICE — DRSNG SURESITE WNDW 4X4.5

## (undated) DEVICE — PAD,ABDOMINAL,8"X10",ST,LF: Brand: MEDLINE

## (undated) DEVICE — GLV SURG BIOGEL LTX PF 6 1/2

## (undated) DEVICE — EXTRCT STPL SKIN STRL BX/12

## (undated) DEVICE — SUT MNCRYL 3/0 PS2 18IN MCP497G

## (undated) DEVICE — ELECTRD BLD EDGE/STD 1P 2.4X6.35MM STRL

## (undated) DEVICE — APPL COTN TP PLSTC 6IN STRL LF PK/2

## (undated) DEVICE — GLV SURG BIOGEL M LTX PF 6 1/2

## (undated) DEVICE — SKIN PREP TRAY W/CHG: Brand: MEDLINE INDUSTRIES, INC.

## (undated) DEVICE — GLV SURG SIGNATURE ESSENTIAL PF LTX SZ6.5

## (undated) DEVICE — PENCL E/S ULTRAVAC TELESCP NOSE HOLSTR 10FT

## (undated) DEVICE — DRSNG SURESITE123 4X10IN

## (undated) DEVICE — TUBING, SUCTION, 1/4" X 20', STRAIGHT: Brand: MEDLINE INDUSTRIES, INC.

## (undated) DEVICE — BIOPATCH™ ANTIMICROBIAL DRESSING WITH CHLORHEXIDINE GLUCONATE IS A HYDROPHILLIC POLYURETHANE ABSORPTIVE FOAM WITH CHLORHEXIDINE GLUCONATE (CHG) WHICH INHIBITS BACTERIAL GROWTH UNDER THE DRESSING. THE DRESSING IS INTENDED TO BE USED TO ABSORB EXUDATE, COVER A WOUND CAUSED BY VASCULAR AND NONVASCULAR PERCUTANEOUS MEDICAL DEVICES DURING SURGERY, AS WELL AS REDUCE LOCAL INFECTION AND COLONIZATION OF MICROORGANISMS.: Brand: BIOPATCH

## (undated) DEVICE — GLV SURG SENSICARE PI MIC PF SZ6.5 LF STRL

## (undated) DEVICE — DRSNG PAD ABD 8X10IN STRL

## (undated) DEVICE — SOL NS 500ML

## (undated) DEVICE — SUT MNCRYL 3/0 PS2 18IN Y497G

## (undated) DEVICE — NDL FLTR 19G 1 1/2 LF

## (undated) DEVICE — UNDYED MONOFILAMENT (POLYDIOXANONE), ABSORBABLE SYNTHETIC SURGICAL SUTURE: Brand: PDS

## (undated) DEVICE — BNDG ELAS ELITE V/CLOSE 6IN 5YD LF STRL

## (undated) DEVICE — JACKSON-PRATT 100CC BULB RESERVOIR: Brand: CARDINAL HEALTH

## (undated) DEVICE — IRRIGATOR BULB ASEPTO 60CC STRL

## (undated) DEVICE — DECANT BG O JET

## (undated) DEVICE — ANTIBACTERIAL UNDYED BRAIDED (POLYGLACTIN 910), SYNTHETIC ABSORBABLE SUTURE: Brand: COATED VICRYL

## (undated) DEVICE — GLV SURG BIOGEL LTX PF 7

## (undated) DEVICE — SUT MNCRYL PLS ANTIB UD 4/0 PS2 18IN

## (undated) DEVICE — BANDAGE,GAUZE,BULKEE II,4.5"X4.1YD,STRL: Brand: MEDLINE

## (undated) DEVICE — SYR LUERLOK 30CC

## (undated) DEVICE — SYS ENSING FLUID M/ ADD MAC1001

## (undated) DEVICE — ELECTRD BLD EZ CLN STD 6.5IN

## (undated) DEVICE — SPNG LAP 18X18IN LF STRL PK/5

## (undated) DEVICE — SUT ETHLN 3/0 PS1 18IN 1663H

## (undated) DEVICE — ASEPTIC FLUID TRANSFER SET: Brand: ASEPTIC FLUID TRANSFER SET

## (undated) DEVICE — 3M™ STERI-STRIP™ REINFORCED ADHESIVE SKIN CLOSURES, R1547, 1/2 IN X 4 IN (12 MM X 100 MM), 6 STRIPS/ENVELOPE: Brand: 3M™ STERI-STRIP™

## (undated) DEVICE — PK UNIV COMPL 40

## (undated) DEVICE — TOTAL TRAY, 16FR 10ML SIL FOLEY, URN: Brand: MEDLINE

## (undated) DEVICE — CVR TRANSD CIV FLX TPR 11.9 TO 3.8X61CM

## (undated) DEVICE — ELECTRD BLD EXT EDGE 1P COAT 6.5IN STRL

## (undated) DEVICE — BIFURCATED DRAIN EXTENSION FOR CLOSED WOUND DRAIN: Brand: AXIOM®

## (undated) DEVICE — VIOLET BRAIDED (POLYGLACTIN 910), SYNTHETIC ABSORBABLE SUTURE: Brand: COATED VICRYL

## (undated) DEVICE — COVER,MAYO STAND,STERILE: Brand: MEDLINE

## (undated) DEVICE — ELECTRD BLD EDGE/INSUL1P 2.4X5.1MM STRL

## (undated) DEVICE — DRAIN JACKSON PRATT ROUND 15FR: Brand: CARDINAL HEALTH